# Patient Record
Sex: MALE | Race: WHITE | Employment: FULL TIME | URBAN - METROPOLITAN AREA
[De-identification: names, ages, dates, MRNs, and addresses within clinical notes are randomized per-mention and may not be internally consistent; named-entity substitution may affect disease eponyms.]

---

## 2017-04-03 ENCOUNTER — HOSPITAL ENCOUNTER (EMERGENCY)
Age: 42
Discharge: HOME | End: 2017-04-03
Attending: EMERGENCY MEDICINE

## 2017-04-03 VITALS
DIASTOLIC BLOOD PRESSURE: 95 MMHG | BODY MASS INDEX: 38.57 KG/M2 | HEIGHT: 66 IN | OXYGEN SATURATION: 93 % | SYSTOLIC BLOOD PRESSURE: 173 MMHG | WEIGHT: 240 LBS | TEMPERATURE: 98.4 F | HEART RATE: 90 BPM | RESPIRATION RATE: 18 BRPM

## 2017-04-03 DIAGNOSIS — J01.00 ACUTE MAXILLARY SINUSITIS, RECURRENCE NOT SPECIFIED: Primary | ICD-10-CM

## 2017-04-03 DIAGNOSIS — R03.0 HIGH BLOOD PRESSURE (NOT HYPERTENSION): ICD-10-CM

## 2017-04-03 PROCEDURE — 99282 EMERGENCY DEPT VISIT SF MDM: CPT

## 2017-04-03 PROCEDURE — 6370000000 HC RX 637 (ALT 250 FOR IP): Performed by: EMERGENCY MEDICINE

## 2017-04-03 RX ORDER — SULFAMETHOXAZOLE AND TRIMETHOPRIM 800; 160 MG/1; MG/1
1 TABLET ORAL ONCE
Status: COMPLETED | OUTPATIENT
Start: 2017-04-03 | End: 2017-04-03

## 2017-04-03 RX ORDER — SULFAMETHOXAZOLE AND TRIMETHOPRIM 800; 160 MG/1; MG/1
1 TABLET ORAL 2 TIMES DAILY
Qty: 20 TABLET | Refills: 0 | Status: SHIPPED | OUTPATIENT
Start: 2017-04-03 | End: 2017-04-13

## 2017-04-03 RX ORDER — BENZONATATE 100 MG/1
100 CAPSULE ORAL 3 TIMES DAILY PRN
Qty: 30 CAPSULE | Refills: 0 | Status: SHIPPED | OUTPATIENT
Start: 2017-04-03 | End: 2017-04-10

## 2017-04-03 RX ADMIN — SULFAMETHOXAZOLE AND TRIMETHOPRIM 1 TABLET: 800; 160 TABLET ORAL at 19:04

## 2017-04-03 ASSESSMENT — ENCOUNTER SYMPTOMS
BACK PAIN: 0
NAUSEA: 0
EYE REDNESS: 0
SINUS PRESSURE: 1
CONSTIPATION: 0
WHEEZING: 0
EYE PAIN: 0
SHORTNESS OF BREATH: 0
EYE DISCHARGE: 0
RHINORRHEA: 0
COLOR CHANGE: 0
DIARRHEA: 0
COUGH: 1
SORE THROAT: 0
CHEST TIGHTNESS: 0
ABDOMINAL PAIN: 0

## 2018-02-07 ENCOUNTER — HOSPITAL ENCOUNTER (EMERGENCY)
Age: 43
Discharge: HOME | End: 2018-02-07
Attending: EMERGENCY MEDICINE

## 2018-02-07 VITALS
RESPIRATION RATE: 16 BRPM | HEIGHT: 66 IN | SYSTOLIC BLOOD PRESSURE: 154 MMHG | BODY MASS INDEX: 41.14 KG/M2 | OXYGEN SATURATION: 97 % | DIASTOLIC BLOOD PRESSURE: 95 MMHG | WEIGHT: 256 LBS | TEMPERATURE: 98.6 F | HEART RATE: 99 BPM

## 2018-02-07 DIAGNOSIS — L03.116 CELLULITIS OF FOOT, LEFT: ICD-10-CM
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
## 2018-02-07 PROCEDURE — 99283 EMERGENCY DEPT VISIT LOW MDM: CPT

## 2018-02-07 PROCEDURE — 6370000000 HC RX 637 (ALT 250 FOR IP): Performed by: EMERGENCY MEDICINE

## 2018-02-07 RX ORDER — IBUPROFEN 800 MG/1
800 TABLET ORAL ONCE
Status: COMPLETED | OUTPATIENT
Start: 2018-02-07 | End: 2018-02-07

## 2018-02-07 RX ORDER — DOXYCYCLINE HYCLATE 100 MG
100 TABLET ORAL ONCE
Status: COMPLETED | OUTPATIENT
Start: 2018-02-07 | End: 2018-02-07

## 2018-02-07 RX ORDER — DOXYCYCLINE 100 MG/1
100 TABLET ORAL 2 TIMES DAILY
Qty: 16 TABLET | Refills: 0 | Status: SHIPPED | OUTPATIENT
Start: 2018-02-07 | End: 2018-02-15

## 2018-02-07 RX ADMIN — DOXYCYCLINE HYCLATE 100 MG: 100 TABLET, COATED ORAL at 22:27

## 2018-02-07 RX ADMIN — IBUPROFEN 800 MG: 800 TABLET ORAL at 22:27

## 2018-02-07 ASSESSMENT — PAIN DESCRIPTION - LOCATION: LOCATION: FOOT

## 2018-02-07 ASSESSMENT — PAIN SCALES - GENERAL: PAINLEVEL_OUTOF10: 6

## 2018-02-07 ASSESSMENT — PAIN DESCRIPTION - ORIENTATION: ORIENTATION: LEFT

## 2018-02-07 ASSESSMENT — PAIN DESCRIPTION - DESCRIPTORS: DESCRIPTORS: ACHING

## 2018-02-08 ASSESSMENT — ENCOUNTER SYMPTOMS
VOMITING: 0
NAUSEA: 0
ABDOMINAL PAIN: 0
DIARRHEA: 0
SHORTNESS OF BREATH: 0
SORE THROAT: 0

## 2018-02-08 NOTE — ED PROVIDER NOTES
[facility] ED  [address] [address]  Phone: [phone]  Fax: [phone]    eMERGENCY dEPARTMENT eNCOUnter          Pt Name: Esau Pimentel  MRN: [id number]  [place] 1975  Date of evaluation: 2/7/2018      CHIEF COMPLAINT       Chief Complaint   Patient presents with    Rash     to extremeties    Foot Pain     left       HISTORY OF PRESENT ILLNESS              Esau Pimentel is a 43 y.o. male who presents with numerous erythematous maculopapular scabbed lesions to both legs from the knees down and the elbows down. Patient tells me that he thinks it may be fleas versus bedbugs. States he did see to bedbugs that he eliminated and has not seen any more. Patient also reports that his pets may have brought fleas in the house and has been trying to eradicate those from the house. He tells me he wears shorts and a T-shirt to bed which explains the distribution of the lesions on his distal arms and legs. He tells me that since yesterday one of the lesions on his left foot has become more erythematous, painful and larger in circumference of the erythema. Denies any fevers. Not currently on antibiotics. States all of his lesions occurred/began about 2-3 days ago. Denies other concerns. REVIEW OF SYSTEMS         Review of Systems   Constitutional: Negative for chills and fever. HENT: Negative for sore throat. Respiratory: Negative for shortness of breath. Cardiovascular: Negative for chest pain. Gastrointestinal: Negative for abdominal pain, diarrhea, nausea and vomiting. Musculoskeletal: Negative for neck pain. Skin: Positive for rash and wound. Neurological: Negative for weakness and numbness. All other systems reviewed and are negative. PAST MEDICAL HISTORY    has a past medical history of Heart attack. SURGICAL HISTORY      has a past surgical history that includes Cardiac catheterization.     CURRENT MEDICATIONS       Discharge minor induration surrounding that lesion. No fluctuance or crepitus detected. Rest of the skin exam has no tenderness to where the lesions are located. Appear to be bug bites of some sort. Otherwise unremarkable skin exam to the exposed areas where the patient is complaining of symptoms. I do not detect lesions on the palms or the soles. No evidence of necrotizing fasciitis. Nursing note and vitals reviewed. DIFFERENTIAL DIAGNOSIS/ MDM:     At this time I feel this is most likely secondary to insect bites. Unclear if these are fleas versus bedbugs. He was advised to continue cleaning his living quarters. I will put the patient on doxycycline to treat his developing foot cellulitis. I do not appreciate any significant edema to the foot. Advised to return right away if worse or for new or concerning symptoms and to follow have close follow-up. He is comfortable with this plan. Also advised to continue with ibuprofen for pain. his last dose was this morning. I have reviewed the disposition diagnosis with the patient and or their family/guardian. I have answered their questions and given discharge instructions. They voiced understanding of these instructions and did not have any further questions or complaints. DIAGNOSTIC RESULTS     EKG: All EKG's are interpreted by the Emergency Department Physician who either signs or Co-signs this chart in the absence of a cardiologist.        RADIOLOGY:   I directly visualized the following  images and reviewed the radiologist interpretations:  No orders to display       No results found. LABS:  No results found for this visit on 02/07/18.     EMERGENCY DEPARTMENT COURSE:     The patient was given the following medications:  Orders Placed This Encounter   Medications    doxycycline monohydrate (ADOXA) 100 MG tablet     Sig: Take 1 tablet by mouth 2 times daily for 8 days     Dispense:  16 tablet     Refill:  0    doxycycline hyclate (VIBRA-TABS)

## 2018-09-09 ENCOUNTER — HOSPITAL ENCOUNTER (EMERGENCY)
Age: 43
Discharge: HOME | End: 2018-09-09
Attending: EMERGENCY MEDICINE

## 2018-09-09 VITALS
RESPIRATION RATE: 16 BRPM | TEMPERATURE: 98.1 F | SYSTOLIC BLOOD PRESSURE: 136 MMHG | DIASTOLIC BLOOD PRESSURE: 84 MMHG | OXYGEN SATURATION: 97 % | HEART RATE: 79 BPM

## 2018-09-09 DIAGNOSIS — R19.7 DIARRHEA, UNSPECIFIED TYPE: Primary | ICD-10-CM

## 2018-09-09 PROCEDURE — 99283 EMERGENCY DEPT VISIT LOW MDM: CPT

## 2018-09-09 NOTE — LETTER
[facility] ED  [address] [address]  Phone: [phone]  Fax: [phone]               September 9, 2018    Patient: Umair Turner   YOB: 1975   Date of Visit: 9/9/2018       To Whom It May Concern:    Iris Mccormikc was seen and treated in our emergency department on 9/9/2018.        Sincerely,       Ashley Johnson RN         Signature:__________________________________

## 2018-09-09 NOTE — ED PROVIDER NOTES
and moist.   Eyes: Pupils are equal, round, and reactive to light. Conjunctivae and EOM are normal.   Neck: Normal range of motion. Neck supple. No tracheal deviation present. Cardiovascular: Normal rate, regular rhythm and intact distal pulses. Pulmonary/Chest: Effort normal and breath sounds normal.   Abdominal: Soft. Bowel sounds are normal. He exhibits no distension. There is no tenderness. Musculoskeletal: Normal range of motion. He exhibits no edema or tenderness. Neurological: He is alert and oriented to person, place, and time. Skin: Skin is warm and dry. No rash noted. Psychiatric: He has a normal mood and affect. His behavior is normal. Judgment and thought content normal.   Vitals reviewed. MDM:   Abdomen is completely benign here. He is hemodynamically stable. I written for stool studies to be done as an outpatient. Supportive care instructions have been given and he will be discharged. The patient was given the following medications:  No orders of the defined types were placed in this encounter. FINAL IMPRESSION      1.  Diarrhea, unspecified type          DISPOSITION/PLAN   DISPOSITION Decision To Discharge 09/09/2018 01:47:21 PM      Condition on Disposition  Good    PATIENT REFERRED TO:  Your personal physician    Schedule an appointment as soon as possible for a visit in 3 days        DISCHARGE MEDICATIONS:  New Prescriptions    No medications on file       (Please note that portions of this note were completed with a voice recognition program.  Efforts were made to edit the dictations but occasionally words are mis-transcribed.)    Rajiv Chi MD, F.A.C.E.P, F.A.A.E.M  Emergency Physician Attending          Rajiv Chi MD  09/09/18 [id number]

## 2023-11-25 ENCOUNTER — HOSPITAL ENCOUNTER (EMERGENCY)
Age: 48
Discharge: HOME | End: 2023-11-25
Attending: STUDENT IN AN ORGANIZED HEALTH CARE EDUCATION/TRAINING PROGRAM
Payer: COMMERCIAL

## 2023-11-25 VITALS
BODY MASS INDEX: 41.78 KG/M2 | HEIGHT: 66 IN | SYSTOLIC BLOOD PRESSURE: 158 MMHG | WEIGHT: 260 LBS | OXYGEN SATURATION: 97 % | DIASTOLIC BLOOD PRESSURE: 80 MMHG | TEMPERATURE: 97.6 F | HEART RATE: 89 BPM | RESPIRATION RATE: 16 BRPM

## 2023-11-25 DIAGNOSIS — K04.7 DENTAL INFECTION: Primary | ICD-10-CM

## 2023-11-25 PROCEDURE — 99283 EMERGENCY DEPT VISIT LOW MDM: CPT

## 2023-11-25 PROCEDURE — 6370000000 HC RX 637 (ALT 250 FOR IP)

## 2023-11-25 RX ORDER — OXYCODONE HYDROCHLORIDE AND ACETAMINOPHEN 5; 325 MG/1; MG/1
1 TABLET ORAL ONCE
Status: COMPLETED | OUTPATIENT
Start: 2023-11-25 | End: 2023-11-25

## 2023-11-25 RX ORDER — CLINDAMYCIN HYDROCHLORIDE 150 MG/1
300 CAPSULE ORAL ONCE
Status: COMPLETED | OUTPATIENT
Start: 2023-11-25 | End: 2023-11-25

## 2023-11-25 RX ORDER — CLINDAMYCIN PHOSPHATE 150 MG/ML
600 INJECTION, SOLUTION INTRAVENOUS ONCE
Status: DISCONTINUED | OUTPATIENT
Start: 2023-11-25 | End: 2023-11-25

## 2023-11-25 RX ORDER — CLINDAMYCIN HYDROCHLORIDE 300 MG/1
300 CAPSULE ORAL 3 TIMES DAILY
Qty: 21 CAPSULE | Refills: 0 | Status: SHIPPED | OUTPATIENT
Start: 2023-11-25 | End: 2023-12-02

## 2023-11-25 RX ORDER — OXYCODONE HYDROCHLORIDE AND ACETAMINOPHEN 5; 325 MG/1; MG/1
1 TABLET ORAL EVERY 6 HOURS PRN
Qty: 12 TABLET | Refills: 0 | Status: SHIPPED | OUTPATIENT
Start: 2023-11-25 | End: 2023-11-28

## 2023-11-25 RX ADMIN — OXYCODONE AND ACETAMINOPHEN 1 TABLET: 5; 325 TABLET ORAL at 14:09

## 2023-11-25 RX ADMIN — CLINDAMYCIN HYDROCHLORIDE 300 MG: 150 CAPSULE ORAL at 14:09

## 2023-11-25 ASSESSMENT — PAIN SCALES - GENERAL: PAINLEVEL_OUTOF10: 8

## 2023-11-25 ASSESSMENT — PAIN DESCRIPTION - LOCATION: LOCATION: MOUTH

## 2023-11-25 ASSESSMENT — PAIN DESCRIPTION - PAIN TYPE: TYPE: ACUTE PAIN

## 2023-11-25 ASSESSMENT — PAIN DESCRIPTION - ORIENTATION: ORIENTATION: RIGHT;LOWER

## 2023-11-25 ASSESSMENT — PAIN - FUNCTIONAL ASSESSMENT: PAIN_FUNCTIONAL_ASSESSMENT: 0-10

## 2024-05-26 ENCOUNTER — HOSPITAL ENCOUNTER (EMERGENCY)
Age: 49
Discharge: HOME | End: 2024-05-26
Attending: EMERGENCY MEDICINE
Payer: COMMERCIAL

## 2024-05-26 ENCOUNTER — APPOINTMENT (OUTPATIENT)
Dept: GENERAL RADIOLOGY | Age: 49
End: 2024-05-26
Payer: COMMERCIAL

## 2024-05-26 VITALS
BODY MASS INDEX: 40.83 KG/M2 | SYSTOLIC BLOOD PRESSURE: 126 MMHG | HEART RATE: 103 BPM | TEMPERATURE: 98.6 F | OXYGEN SATURATION: 92 % | DIASTOLIC BLOOD PRESSURE: 75 MMHG | WEIGHT: 260.14 LBS | RESPIRATION RATE: 18 BRPM | HEIGHT: 67 IN

## 2024-05-26 DIAGNOSIS — J44.1 COPD EXACERBATION (HCC): Primary | ICD-10-CM

## 2024-05-26 LAB
FLUAV AG SPEC QL: NEGATIVE
FLUBV AG SPEC QL: NEGATIVE
GLUCOSE BLD-MCNC: 93 MG/DL (ref 75–110)
SARS-COV-2 RDRP RESP QL NAA+PROBE: NOT DETECTED
SPECIMEN DESCRIPTION: NORMAL

## 2024-05-26 PROCEDURE — 71046 X-RAY EXAM CHEST 2 VIEWS: CPT

## 2024-05-26 PROCEDURE — 6370000000 HC RX 637 (ALT 250 FOR IP): Performed by: NURSE PRACTITIONER

## 2024-05-26 PROCEDURE — 99284 EMERGENCY DEPT VISIT MOD MDM: CPT

## 2024-05-26 PROCEDURE — 82947 ASSAY GLUCOSE BLOOD QUANT: CPT

## 2024-05-26 PROCEDURE — 87804 INFLUENZA ASSAY W/OPTIC: CPT

## 2024-05-26 PROCEDURE — 94640 AIRWAY INHALATION TREATMENT: CPT

## 2024-05-26 PROCEDURE — 87635 SARS-COV-2 COVID-19 AMP PRB: CPT

## 2024-05-26 RX ORDER — DOXYCYCLINE HYCLATE 100 MG
100 TABLET ORAL 2 TIMES DAILY
Qty: 20 TABLET | Refills: 0 | Status: SHIPPED | OUTPATIENT
Start: 2024-05-26 | End: 2024-06-05

## 2024-05-26 RX ORDER — PREDNISONE 20 MG/1
TABLET ORAL
Qty: 15 TABLET | Refills: 0 | Status: SHIPPED | OUTPATIENT
Start: 2024-05-27 | End: 2024-06-06

## 2024-05-26 RX ORDER — PREDNISONE 20 MG/1
60 TABLET ORAL ONCE
Status: COMPLETED | OUTPATIENT
Start: 2024-05-26 | End: 2024-05-26

## 2024-05-26 RX ORDER — DOXYCYCLINE HYCLATE 100 MG
100 TABLET ORAL ONCE
Status: COMPLETED | OUTPATIENT
Start: 2024-05-26 | End: 2024-05-26

## 2024-05-26 RX ORDER — ALBUTEROL SULFATE 90 UG/1
2 AEROSOL, METERED RESPIRATORY (INHALATION) 4 TIMES DAILY PRN
Qty: 18 G | Refills: 0 | Status: SHIPPED | OUTPATIENT
Start: 2024-05-26

## 2024-05-26 RX ORDER — IPRATROPIUM BROMIDE AND ALBUTEROL SULFATE 2.5; .5 MG/3ML; MG/3ML
1 SOLUTION RESPIRATORY (INHALATION) ONCE
Status: COMPLETED | OUTPATIENT
Start: 2024-05-26 | End: 2024-05-26

## 2024-05-26 RX ADMIN — IPRATROPIUM BROMIDE AND ALBUTEROL SULFATE 1 DOSE: 2.5; .5 SOLUTION RESPIRATORY (INHALATION) at 13:43

## 2024-05-26 RX ADMIN — PREDNISONE 60 MG: 20 TABLET ORAL at 13:03

## 2024-05-26 RX ADMIN — DOXYCYCLINE HYCLATE 100 MG: 100 TABLET, COATED ORAL at 14:21

## 2024-05-26 ASSESSMENT — PAIN - FUNCTIONAL ASSESSMENT: PAIN_FUNCTIONAL_ASSESSMENT: NONE - DENIES PAIN

## 2024-05-26 NOTE — DISCHARGE INSTRUCTIONS
Complete doxycycline as prescribed antibiotic.    Complete prednisone as prescribed.    Albuterol inhaler as prescribed to help with coughing wheezing.    Return to the ER: Fevers not responding to Tylenol, chest pain, continued or worsening breathing difficulty, weakness or confusion, lightheaded or dizziness; or any other concerning symptoms.

## 2024-05-26 NOTE — ED PROVIDER NOTES
[facility] EMERGENCY DEPARTMENT  EMERGENCY DEPARTMENT ENCOUNTER      Pt Name: Jesús Min  MRN: [id number]  Birthdate 1975  Date of evaluation: 5/26/2024  Provider: JOSE Bautista CNP  2:18 PM    CHIEF COMPLAINT       Chief Complaint   Patient presents with    Shortness of Breath     Shortness of breath, productive cough.  Onset about 4 days ago.  Pt is 1 pack/day cigarette smoker         HISTORY OF PRESENT ILLNESS    Jesús Min is a 49 y.o. male who presents to the emergency department      This is a nontoxic-appearing 49-year-old male who is a 1 pack/day tobacco smoker; reports over the last 4 days he started with nasal congestion, chills, increased difficulty breathing chest congestion, productive cough with white sputum, he is not currently following with a family care provider, was concerned because of the worsening breathing difficulty, wheezing prompting him to come to the emergency department for evaluation.  No alleviating measures have been attempted prior to arrival.  The patient denies that he has any chest pain with this.  He states that he has noticed that his breathing has gotten worse over the past 6 months.  The patient has no inhalers currently.  The patient states he has not formally been diagnosed with COPD or asthma.    The history is provided by the patient and medical records.       Nursing Notes were reviewed.    REVIEW OF SYSTEMS       Review of Systems   Constitutional:  Positive for chills. Negative for fever.   HENT:  Positive for congestion and sore throat. Negative for ear pain and sneezing.    Eyes:  Negative for discharge and visual disturbance.   Respiratory:  Positive for cough and shortness of breath.    Cardiovascular:  Negative for chest pain and palpitations.   Gastrointestinal:  Negative for abdominal pain, constipation, diarrhea, nausea and vomiting.   Genitourinary:  Negative for dysuria and frequency.   Musculoskeletal:  Negative for back

## 2024-05-26 NOTE — ED PROVIDER NOTES
[facility] Emergency Department      Pt Name: Jesús Min  MRN: [id number]  Birthdate 1975  Date of evaluation: 5/26/2024    EMERGENCY DEPARTMENT ENCOUNTER      PERTINENT ATTENDING PHYSICIAN COMMENTS:      Faculty Attestation    I performed a history and physical examination of the patient and discussed management with the mid level provideer. I reviewed the mid level provider's note and agree with the documented findings and plan of care.Any areas of disagreement are noted on the chart. I was personally present for the key portions of any procedures. I have documented in the chart those procedures where I was not present during the key portions. I have reviewed the emergency nurses triage note. I agree with the chief complaint, past medical history, past surgical history, allergies, medications, social and family history as documented unless otherwise noted below. Documentation of the HPI, Physical Exam and Medical Decision Making performed by medical students or scribes is based on my personal performance of the HPI, PE and MDM. For Residents/Physician Assistant/ Nurse Practitioner cases/documentation I have personally evaluated this patient and have completed at least one if not all key elements of the E/M (history, physical exam, and MDM). Additional findings are as noted.    CHIEF COMPLAINT       Chief Complaint   Patient presents with    Shortness of Breath     Shortness of breath, productive cough.  Onset about 4 days ago.  Pt is 1 pack/day cigarette smoker       HISTORY OF PRESENT ILLNESS    Jesús Min is a 49 y.o. male who presents to the emergency department complaining of 4 to 5 days of cough and congestion with wheezing.  He is a smoker.  Denies any chest pain.  He admits to exertional shortness of breath with this productive cough.  Denies any other relevant symptoms.  No leg pain or leg swelling.    PAST MEDICAL HISTORY    has a past medical history of Heart attack

## 2024-06-11 ENCOUNTER — HOSPITAL ENCOUNTER (OUTPATIENT)
Age: 49
Discharge: HOME | End: 2024-06-13
Payer: COMMERCIAL

## 2024-06-11 ENCOUNTER — HOSPITAL ENCOUNTER (OUTPATIENT)
Dept: GENERAL RADIOLOGY | Age: 49
Discharge: HOME | End: 2024-06-13
Payer: COMMERCIAL

## 2024-06-11 ENCOUNTER — OFFICE VISIT (OUTPATIENT)
Dept: FAMILY MEDICINE CLINIC | Age: 49
End: 2024-06-11
Payer: COMMERCIAL

## 2024-06-11 VITALS
HEART RATE: 104 BPM | DIASTOLIC BLOOD PRESSURE: 82 MMHG | WEIGHT: 253 LBS | BODY MASS INDEX: 39.71 KG/M2 | OXYGEN SATURATION: 95 % | RESPIRATION RATE: 20 BRPM | TEMPERATURE: 98.6 F | SYSTOLIC BLOOD PRESSURE: 134 MMHG

## 2024-06-11 DIAGNOSIS — R09.81 NASAL CONGESTION: ICD-10-CM

## 2024-06-11 DIAGNOSIS — Z72.0 TOBACCO ABUSE: ICD-10-CM

## 2024-06-11 DIAGNOSIS — R06.2 WHEEZING: ICD-10-CM

## 2024-06-11 DIAGNOSIS — R06.02 SHORTNESS OF BREATH: ICD-10-CM

## 2024-06-11 DIAGNOSIS — R05.1 ACUTE COUGH: Primary | ICD-10-CM

## 2024-06-11 DIAGNOSIS — R05.1 ACUTE COUGH: ICD-10-CM

## 2024-06-11 PROCEDURE — 99204 OFFICE O/P NEW MOD 45 MIN: CPT | Performed by: NURSE PRACTITIONER

## 2024-06-11 PROCEDURE — G8417 CALC BMI ABV UP PARAM F/U: HCPCS | Performed by: NURSE PRACTITIONER

## 2024-06-11 PROCEDURE — G8427 DOCREV CUR MEDS BY ELIG CLIN: HCPCS | Performed by: NURSE PRACTITIONER

## 2024-06-11 PROCEDURE — 71046 X-RAY EXAM CHEST 2 VIEWS: CPT

## 2024-06-11 PROCEDURE — 4004F PT TOBACCO SCREEN RCVD TLK: CPT | Performed by: NURSE PRACTITIONER

## 2024-06-11 RX ORDER — FLUTICASONE PROPIONATE 50 MCG
2 SPRAY, SUSPENSION (ML) NASAL DAILY
Qty: 16 G | Refills: 0 | Status: ON HOLD | OUTPATIENT
Start: 2024-06-11

## 2024-06-11 RX ORDER — AZITHROMYCIN 250 MG/1
TABLET, FILM COATED ORAL
Qty: 1 PACKET | Refills: 0 | Status: ON HOLD | OUTPATIENT
Start: 2024-06-11

## 2024-06-11 RX ORDER — GUAIFENESIN 600 MG/1
600 TABLET, EXTENDED RELEASE ORAL 2 TIMES DAILY
Qty: 30 TABLET | Refills: 0 | Status: ON HOLD | OUTPATIENT
Start: 2024-06-11 | End: 2024-06-26

## 2024-06-11 RX ORDER — PREDNISONE 20 MG/1
20 TABLET ORAL 2 TIMES DAILY
Qty: 10 TABLET | Refills: 0 | Status: ON HOLD | OUTPATIENT
Start: 2024-06-11 | End: 2024-06-16

## 2024-06-11 SDOH — ECONOMIC STABILITY: FOOD INSECURITY: WITHIN THE PAST 12 MONTHS, YOU WORRIED THAT YOUR FOOD WOULD RUN OUT BEFORE YOU GOT MONEY TO BUY MORE.: NEVER TRUE

## 2024-06-11 SDOH — ECONOMIC STABILITY: INCOME INSECURITY: HOW HARD IS IT FOR YOU TO PAY FOR THE VERY BASICS LIKE FOOD, HOUSING, MEDICAL CARE, AND HEATING?: NOT VERY HARD

## 2024-06-11 SDOH — ECONOMIC STABILITY: FOOD INSECURITY: WITHIN THE PAST 12 MONTHS, THE FOOD YOU BOUGHT JUST DIDN'T LAST AND YOU DIDN'T HAVE MONEY TO GET MORE.: NEVER TRUE

## 2024-06-11 SDOH — ECONOMIC STABILITY: HOUSING INSECURITY
IN THE LAST 12 MONTHS, WAS THERE A TIME WHEN YOU DID NOT HAVE A STEADY PLACE TO SLEEP OR SLEPT IN A SHELTER (INCLUDING NOW)?: NO

## 2024-06-11 ASSESSMENT — PATIENT HEALTH QUESTIONNAIRE - PHQ9
SUM OF ALL RESPONSES TO PHQ QUESTIONS 1-9: 0
2. FEELING DOWN, DEPRESSED OR HOPELESS: NOT AT ALL
SUM OF ALL RESPONSES TO PHQ QUESTIONS 1-9: 0
1. LITTLE INTEREST OR PLEASURE IN DOING THINGS: NOT AT ALL
SUM OF ALL RESPONSES TO PHQ9 QUESTIONS 1 & 2: 0

## 2024-06-11 ASSESSMENT — ENCOUNTER SYMPTOMS
HEARTBURN: 0
CHOKING: 0
HEMOPTYSIS: 0
RHINORRHEA: 1
SORE THROAT: 0
SHORTNESS OF BREATH: 1
WHEEZING: 1
CHEST TIGHTNESS: 0
COUGH: 1
STRIDOR: 0

## 2024-06-11 NOTE — PROGRESS NOTES
benefit, and side effects of prescribed medications.  All patient questions answered.  Pt voicedunderstanding.    Electronically signed by JOSE Moses CNP on 6/11/2024 at 6:39 PM

## 2024-06-15 ENCOUNTER — HOSPITAL ENCOUNTER (INPATIENT)
Age: 49
LOS: 3 days | Discharge: ANOTHER HOSPITAL | End: 2024-06-18
Attending: EMERGENCY MEDICINE | Admitting: STUDENT IN AN ORGANIZED HEALTH CARE EDUCATION/TRAINING PROGRAM
Payer: COMMERCIAL

## 2024-06-15 ENCOUNTER — APPOINTMENT (OUTPATIENT)
Dept: CT IMAGING | Age: 49
End: 2024-06-15
Payer: COMMERCIAL

## 2024-06-15 ENCOUNTER — APPOINTMENT (OUTPATIENT)
Dept: GENERAL RADIOLOGY | Age: 49
End: 2024-06-15
Payer: COMMERCIAL

## 2024-06-15 DIAGNOSIS — R07.9 CHEST PAIN, UNSPECIFIED TYPE: Primary | ICD-10-CM

## 2024-06-15 DIAGNOSIS — I25.10 CAD, MULTIPLE VESSEL: ICD-10-CM

## 2024-06-15 DIAGNOSIS — I21.4 NSTEMI (NON-ST ELEVATED MYOCARDIAL INFARCTION) (HCC): ICD-10-CM

## 2024-06-15 PROBLEM — I50.9 NEW ONSET OF CONGESTIVE HEART FAILURE (HCC): Status: ACTIVE | Noted: 2024-06-15

## 2024-06-15 LAB
ALBUMIN SERPL-MCNC: 3.5 G/DL (ref 3.5–5.2)
ALBUMIN/GLOB SERPL: 1 {RATIO} (ref 1–2.5)
ALP SERPL-CCNC: 104 U/L (ref 40–129)
ALT SERPL-CCNC: 50 U/L (ref 5–41)
ANION GAP SERPL CALCULATED.3IONS-SCNC: 12 MMOL/L (ref 9–17)
AST SERPL-CCNC: 26 U/L
BASOPHILS # BLD: 0.1 K/UL (ref 0–0.2)
BASOPHILS NFR BLD: 1 % (ref 0–2)
BILIRUB SERPL-MCNC: 0.4 MG/DL (ref 0.3–1.2)
BNP SERPL-MCNC: 6732 PG/ML
BUN SERPL-MCNC: 13 MG/DL (ref 6–20)
CALCIUM SERPL-MCNC: 9.1 MG/DL (ref 8.6–10.4)
CHLORIDE SERPL-SCNC: 105 MMOL/L (ref 98–107)
CO2 SERPL-SCNC: 22 MMOL/L (ref 20–31)
CREAT SERPL-MCNC: 0.8 MG/DL (ref 0.7–1.2)
EOSINOPHIL # BLD: 0 K/UL (ref 0–0.4)
EOSINOPHILS RELATIVE PERCENT: 0 % (ref 1–4)
ERYTHROCYTE [DISTWIDTH] IN BLOOD BY AUTOMATED COUNT: 15.8 % (ref 12.5–15.4)
ERYTHROCYTE [DISTWIDTH] IN BLOOD BY AUTOMATED COUNT: 16 % (ref 12.5–15.4)
GFR, ESTIMATED: >90 ML/MIN/1.73M2
GLUCOSE SERPL-MCNC: 131 MG/DL (ref 70–99)
HCT VFR BLD AUTO: 39.2 % (ref 41–53)
HCT VFR BLD AUTO: 40.5 % (ref 41–53)
HGB BLD-MCNC: 12.7 G/DL (ref 13.5–17.5)
HGB BLD-MCNC: 13.1 G/DL (ref 13.5–17.5)
LYMPHOCYTES NFR BLD: 2.3 K/UL (ref 1–4.8)
LYMPHOCYTES RELATIVE PERCENT: 19 % (ref 24–44)
MAGNESIUM SERPL-MCNC: 2.2 MG/DL (ref 1.6–2.6)
MCH RBC QN AUTO: 27.8 PG (ref 26–34)
MCH RBC QN AUTO: 28 PG (ref 26–34)
MCHC RBC AUTO-ENTMCNC: 32.3 G/DL (ref 31–37)
MCHC RBC AUTO-ENTMCNC: 32.4 G/DL (ref 31–37)
MCV RBC AUTO: 86.2 FL (ref 80–100)
MCV RBC AUTO: 86.3 FL (ref 80–100)
MONOCYTES NFR BLD: 0.7 K/UL (ref 0.1–1.2)
MONOCYTES NFR BLD: 6 % (ref 2–11)
NEUTROPHILS NFR BLD: 74 % (ref 36–66)
NEUTS SEG NFR BLD: 8.9 K/UL (ref 1.8–7.7)
PARTIAL THROMBOPLASTIN TIME: 26 SEC (ref 21.3–31.3)
PLATELET # BLD AUTO: 310 K/UL (ref 140–450)
PLATELET # BLD AUTO: 359 K/UL (ref 140–450)
PMV BLD AUTO: 7.5 FL (ref 6–12)
PMV BLD AUTO: 7.6 FL (ref 6–12)
POTASSIUM SERPL-SCNC: 4 MMOL/L (ref 3.7–5.3)
PROT SERPL-MCNC: 6.9 G/DL (ref 6.4–8.3)
RBC # BLD AUTO: 4.55 M/UL (ref 4.5–5.9)
RBC # BLD AUTO: 4.7 M/UL (ref 4.5–5.9)
SODIUM SERPL-SCNC: 139 MMOL/L (ref 135–144)
TROPONIN I SERPL HS-MCNC: 481 NG/L (ref 0–22)
TROPONIN I SERPL HS-MCNC: 488 NG/L (ref 0–22)
TROPONIN I SERPL HS-MCNC: 567 NG/L (ref 0–22)
WBC OTHER # BLD: 11.8 K/UL (ref 3.5–11)
WBC OTHER # BLD: 12 K/UL (ref 3.5–11)

## 2024-06-15 PROCEDURE — 6360000002 HC RX W HCPCS

## 2024-06-15 PROCEDURE — 6370000000 HC RX 637 (ALT 250 FOR IP): Performed by: STUDENT IN AN ORGANIZED HEALTH CARE EDUCATION/TRAINING PROGRAM

## 2024-06-15 PROCEDURE — 85025 COMPLETE CBC W/AUTO DIFF WBC: CPT

## 2024-06-15 PROCEDURE — 83880 ASSAY OF NATRIURETIC PEPTIDE: CPT

## 2024-06-15 PROCEDURE — 94761 N-INVAS EAR/PLS OXIMETRY MLT: CPT

## 2024-06-15 PROCEDURE — 71260 CT THORAX DX C+: CPT

## 2024-06-15 PROCEDURE — 2580000003 HC RX 258: Performed by: EMERGENCY MEDICINE

## 2024-06-15 PROCEDURE — 85027 COMPLETE CBC AUTOMATED: CPT

## 2024-06-15 PROCEDURE — 94640 AIRWAY INHALATION TREATMENT: CPT

## 2024-06-15 PROCEDURE — 6360000004 HC RX CONTRAST MEDICATION: Performed by: EMERGENCY MEDICINE

## 2024-06-15 PROCEDURE — 99222 1ST HOSP IP/OBS MODERATE 55: CPT | Performed by: STUDENT IN AN ORGANIZED HEALTH CARE EDUCATION/TRAINING PROGRAM

## 2024-06-15 PROCEDURE — 2700000000 HC OXYGEN THERAPY PER DAY

## 2024-06-15 PROCEDURE — 2060000000 HC ICU INTERMEDIATE R&B

## 2024-06-15 PROCEDURE — 6360000002 HC RX W HCPCS: Performed by: STUDENT IN AN ORGANIZED HEALTH CARE EDUCATION/TRAINING PROGRAM

## 2024-06-15 PROCEDURE — 85730 THROMBOPLASTIN TIME PARTIAL: CPT

## 2024-06-15 PROCEDURE — 83735 ASSAY OF MAGNESIUM: CPT

## 2024-06-15 PROCEDURE — 84484 ASSAY OF TROPONIN QUANT: CPT

## 2024-06-15 PROCEDURE — 6370000000 HC RX 637 (ALT 250 FOR IP): Performed by: NURSE PRACTITIONER

## 2024-06-15 PROCEDURE — 80053 COMPREHEN METABOLIC PANEL: CPT

## 2024-06-15 PROCEDURE — 36415 COLL VENOUS BLD VENIPUNCTURE: CPT

## 2024-06-15 PROCEDURE — 2580000003 HC RX 258: Performed by: STUDENT IN AN ORGANIZED HEALTH CARE EDUCATION/TRAINING PROGRAM

## 2024-06-15 PROCEDURE — 93005 ELECTROCARDIOGRAM TRACING: CPT

## 2024-06-15 PROCEDURE — 99285 EMERGENCY DEPT VISIT HI MDM: CPT

## 2024-06-15 RX ORDER — SODIUM CHLORIDE 0.9 % (FLUSH) 0.9 %
10 SYRINGE (ML) INJECTION PRN
Status: DISCONTINUED | OUTPATIENT
Start: 2024-06-15 | End: 2024-06-18 | Stop reason: HOSPADM

## 2024-06-15 RX ORDER — HEPARIN SODIUM 1000 [USP'U]/ML
4000 INJECTION, SOLUTION INTRAVENOUS; SUBCUTANEOUS ONCE
Status: COMPLETED | OUTPATIENT
Start: 2024-06-15 | End: 2024-06-15

## 2024-06-15 RX ORDER — FUROSEMIDE 10 MG/ML
40 INJECTION INTRAMUSCULAR; INTRAVENOUS 2 TIMES DAILY
Status: DISCONTINUED | OUTPATIENT
Start: 2024-06-15 | End: 2024-06-18

## 2024-06-15 RX ORDER — POLYETHYLENE GLYCOL 3350 17 G/17G
17 POWDER, FOR SOLUTION ORAL DAILY PRN
Status: DISCONTINUED | OUTPATIENT
Start: 2024-06-15 | End: 2024-06-18 | Stop reason: HOSPADM

## 2024-06-15 RX ORDER — ACETAMINOPHEN 650 MG/1
650 SUPPOSITORY RECTAL EVERY 6 HOURS PRN
Status: DISCONTINUED | OUTPATIENT
Start: 2024-06-15 | End: 2024-06-18 | Stop reason: HOSPADM

## 2024-06-15 RX ORDER — SODIUM CHLORIDE 0.9 % (FLUSH) 0.9 %
5-40 SYRINGE (ML) INJECTION EVERY 12 HOURS SCHEDULED
Status: DISCONTINUED | OUTPATIENT
Start: 2024-06-15 | End: 2024-06-18 | Stop reason: HOSPADM

## 2024-06-15 RX ORDER — SODIUM CHLORIDE 0.9 % (FLUSH) 0.9 %
5-40 SYRINGE (ML) INJECTION PRN
Status: DISCONTINUED | OUTPATIENT
Start: 2024-06-15 | End: 2024-06-18 | Stop reason: HOSPADM

## 2024-06-15 RX ORDER — HEPARIN SODIUM 1000 [USP'U]/ML
2000 INJECTION, SOLUTION INTRAVENOUS; SUBCUTANEOUS PRN
Status: DISCONTINUED | OUTPATIENT
Start: 2024-06-15 | End: 2024-06-18 | Stop reason: HOSPADM

## 2024-06-15 RX ORDER — HEPARIN SODIUM 10000 [USP'U]/100ML
5-30 INJECTION, SOLUTION INTRAVENOUS CONTINUOUS
Status: DISCONTINUED | OUTPATIENT
Start: 2024-06-15 | End: 2024-06-18 | Stop reason: HOSPADM

## 2024-06-15 RX ORDER — ONDANSETRON 4 MG/1
4 TABLET, ORALLY DISINTEGRATING ORAL EVERY 8 HOURS PRN
Status: DISCONTINUED | OUTPATIENT
Start: 2024-06-15 | End: 2024-06-18 | Stop reason: HOSPADM

## 2024-06-15 RX ORDER — ALBUTEROL SULFATE 90 UG/1
2 AEROSOL, METERED RESPIRATORY (INHALATION) EVERY 4 HOURS PRN
Status: DISCONTINUED | OUTPATIENT
Start: 2024-06-15 | End: 2024-06-18 | Stop reason: HOSPADM

## 2024-06-15 RX ORDER — LORAZEPAM 2 MG/ML
0.5 INJECTION INTRAMUSCULAR ONCE
Status: COMPLETED | OUTPATIENT
Start: 2024-06-15 | End: 2024-06-15

## 2024-06-15 RX ORDER — MAGNESIUM SULFATE IN WATER 40 MG/ML
2000 INJECTION, SOLUTION INTRAVENOUS PRN
Status: DISCONTINUED | OUTPATIENT
Start: 2024-06-15 | End: 2024-06-18 | Stop reason: HOSPADM

## 2024-06-15 RX ORDER — 0.9 % SODIUM CHLORIDE 0.9 %
80 INTRAVENOUS SOLUTION INTRAVENOUS ONCE
Status: DISCONTINUED | OUTPATIENT
Start: 2024-06-15 | End: 2024-06-18 | Stop reason: HOSPADM

## 2024-06-15 RX ORDER — ONDANSETRON 2 MG/ML
4 INJECTION INTRAMUSCULAR; INTRAVENOUS EVERY 6 HOURS PRN
Status: DISCONTINUED | OUTPATIENT
Start: 2024-06-15 | End: 2024-06-18 | Stop reason: HOSPADM

## 2024-06-15 RX ORDER — ACETAMINOPHEN 325 MG/1
650 TABLET ORAL EVERY 6 HOURS PRN
Status: DISCONTINUED | OUTPATIENT
Start: 2024-06-15 | End: 2024-06-18 | Stop reason: HOSPADM

## 2024-06-15 RX ORDER — HEPARIN SODIUM 1000 [USP'U]/ML
4000 INJECTION, SOLUTION INTRAVENOUS; SUBCUTANEOUS PRN
Status: DISCONTINUED | OUTPATIENT
Start: 2024-06-15 | End: 2024-06-18 | Stop reason: HOSPADM

## 2024-06-15 RX ORDER — HYDROXYZINE HYDROCHLORIDE 25 MG/1
25 TABLET, FILM COATED ORAL 3 TIMES DAILY PRN
Status: DISCONTINUED | OUTPATIENT
Start: 2024-06-15 | End: 2024-06-18 | Stop reason: HOSPADM

## 2024-06-15 RX ORDER — SODIUM CHLORIDE 9 MG/ML
INJECTION, SOLUTION INTRAVENOUS PRN
Status: DISCONTINUED | OUTPATIENT
Start: 2024-06-15 | End: 2024-06-18 | Stop reason: HOSPADM

## 2024-06-15 RX ORDER — POTASSIUM CHLORIDE 7.45 MG/ML
10 INJECTION INTRAVENOUS PRN
Status: DISCONTINUED | OUTPATIENT
Start: 2024-06-15 | End: 2024-06-18 | Stop reason: HOSPADM

## 2024-06-15 RX ORDER — POTASSIUM CHLORIDE 20 MEQ/1
40 TABLET, EXTENDED RELEASE ORAL PRN
Status: DISCONTINUED | OUTPATIENT
Start: 2024-06-15 | End: 2024-06-18 | Stop reason: HOSPADM

## 2024-06-15 RX ADMIN — Medication 0.5 MG: at 16:50

## 2024-06-15 RX ADMIN — SODIUM CHLORIDE, PRESERVATIVE FREE 10 ML: 5 INJECTION INTRAVENOUS at 16:03

## 2024-06-15 RX ADMIN — HEPARIN SODIUM 8 UNITS/KG/HR: 10000 INJECTION, SOLUTION INTRAVENOUS at 16:50

## 2024-06-15 RX ADMIN — ALBUTEROL SULFATE 2 PUFF: 90 AEROSOL, METERED RESPIRATORY (INHALATION) at 23:00

## 2024-06-15 RX ADMIN — HEPARIN SODIUM 4000 UNITS: 1000 INJECTION INTRAVENOUS; SUBCUTANEOUS at 16:44

## 2024-06-15 RX ADMIN — SODIUM CHLORIDE, PRESERVATIVE FREE 10 ML: 5 INJECTION INTRAVENOUS at 20:40

## 2024-06-15 RX ADMIN — IOPAMIDOL 75 ML: 755 INJECTION, SOLUTION INTRAVENOUS at 16:02

## 2024-06-15 RX ADMIN — Medication 80 ML: at 16:03

## 2024-06-15 RX ADMIN — FUROSEMIDE 40 MG: 10 INJECTION, SOLUTION INTRAMUSCULAR; INTRAVENOUS at 20:39

## 2024-06-15 RX ADMIN — HYDROXYZINE HYDROCHLORIDE 25 MG: 25 TABLET, FILM COATED ORAL at 21:45

## 2024-06-15 ASSESSMENT — PAIN - FUNCTIONAL ASSESSMENT: PAIN_FUNCTIONAL_ASSESSMENT: NONE - DENIES PAIN

## 2024-06-15 NOTE — ED PROVIDER NOTES
[facility] EMERGENCY DEPARTMENT  Emergency Department Encounter  Mid Level Provider     Pt Name: Jesús Min  MRN: [id number]  Birthdate 1975  Date of evaluation: 6/15/24  PCP:  No primary care provider on file.    CHIEF COMPLAINT       Chief Complaint   Patient presents with    Shortness of Breath    Leg Swelling     Shortness of breath and bilateral leg swelling.  Shortness of breath started 1 day ago.  Leg swelling onset several days ago.        HISTORY OF PRESENT ILLNESS  (Location/Symptom, Timing/Onset,Context/Setting, Quality, Duration, Modifying Factors, Severity.)      Jesús Min is a 49 y.o. male who presents with complaints of exertional dyspnea and bilateral lower extremity edema.  Reports that leg swelling has been ongoing over the past 3 to 5 days but exertional dyspnea began yesterday.  He reports he was at a concert he was walking up stairs to get to his seat and noticed that every 5 steps he had to stop and take a breath.  He used his albuterol inhaler without any improvement.  He denies any associated chest pain, nausea, vomiting, diaphoresis with this shortness of breath.    He does endorse that he has recently been seen at both [place] emergency department and the walk-in clinic for shortness of breath and a cough with wheezing he has been on prednisone for this as well as antibiotics and an inhaler.  He was feeling better up until the leg swelling started.    He denies a history of COPD, asthma, congestive heart failure.  He is a daily smoker, and has a history of MI 15 to 20 years prior.    PAST MEDICAL /SURGICAL / SOCIAL / FAMILY HISTORY      has a past medical history of Heart attack (HCC).     has a past surgical history that includes Cardiac catheterization.    Social History     Socioeconomic History    Marital status:      Spouse name: Not on file    Number of children: Not on file    Years of education: Not on file    Highest education level: Not on  EMBOLISM W CONTRAST    CBC with Auto Differential    CMP    Magnesium    Troponin    Brain Natriuretic Peptide    CBC    CBC    APTT    EKG 12 Lead    ADMIT TO INPATIENT       Medications Ordered:  Orders Placed This Encounter   Medications    sodium chloride 0.9 % bolus 80 mL    iopamidol (ISOVUE-370) 76 % injection 75 mL    sodium chloride flush 0.9 % injection 10 mL    heparin (porcine) injection 4,000 Units    heparin (porcine) injection 4,000 Units    heparin (porcine) injection 2,000 Units    heparin 25,000 units in dextrose 5% 250 mL (premix) infusion    LORazepam (ATIVAN) injection 0.5 mg       Controlled Substances Monitoring:      DIAGNOSTIC RESULTS / RE-EVALUATION     Radiology:   I directly visualized(with the attending physician) the following  images and reviewed the radiologist interpretations:    CT CHEST PULMONARY EMBOLISM W CONTRAST    (Results Pending)       Labs:  Results for orders placed or performed during the hospital encounter of 06/15/24   CBC with Auto Differential   Result Value Ref Range    WBC 12.0 (H) 3.5 - 11.0 k/uL    RBC 4.70 4.5 - 5.9 m/uL    Hemoglobin 13.1 (L) 13.5 - 17.5 g/dL    Hematocrit 40.5 (L) 41 - 53 %    MCV 86.3 80 - 100 fL    MCH 28.0 26 - 34 pg    MCHC 32.4 31 - 37 g/dL    RDW 16.0 (H) 12.5 - 15.4 %    Platelets 359 140 - 450 k/uL    MPV 7.6 6.0 - 12.0 fL    Neutrophils % 74 (H) 36 - 66 %    Lymphocytes % 19 (L) 24 - 44 %    Monocytes % 6 2 - 11 %    Eosinophils % 0 (L) 1 - 4 %    Basophils % 1 0 - 2 %    Neutrophils Absolute 8.90 (H) 1.8 - 7.7 k/uL    Lymphocytes Absolute 2.30 1.0 - 4.8 k/uL    Monocytes Absolute 0.70 0.1 - 1.2 k/uL    Eosinophils Absolute 0.00 0.0 - 0.4 k/uL    Basophils Absolute 0.10 0.0 - 0.2 k/uL   CMP   Result Value Ref Range    Sodium 139 135 - 144 mmol/L    Potassium 4.0 3.7 - 5.3 mmol/L    Chloride 105 98 - 107 mmol/L    CO2 22 20 - 31 mmol/L    Anion Gap 12 9 - 17 mmol/L    Glucose 131 (H) 70 - 99 mg/dL    BUN 13 6 - 20 mg/dL    Creatinine

## 2024-06-15 NOTE — ED PROVIDER NOTES
[facility] Emergency Department  [address]  [place] [address]  Phone: [phone]  Fax: [phone]      Attending Physician Attestation    I performed a history and physical examination of the patient and discussed management with the mid level provider. I reviewed the mid level provider's note and agree with the documented findings and plan of care. Any areas of disagreement are noted on the chart. I was personally present for the key portions of any procedures. I have documented in the chart those procedures where I was not present during the key portions. I have reviewed the emergency nurses triage note. I agree with the chief complaint, past medical history, past surgical history, allergies, medications, social and family history as documented unless otherwise noted below. Documentation of the HPI, Physical Exam and Medical Decision Making performed by mid level providers is based on my personal performance of the HPI, PE and MDM. For Physician Assistant/ Nurse Practitioner cases/documentation I have personally evaluated this patient and have completed at least one if not all key elements of the E/M (history, physical exam, and MDM). Additional findings are as noted.      CHIEF COMPLAINT       Chief Complaint   Patient presents with    Shortness of Breath    Leg Swelling     Shortness of breath and bilateral leg swelling.  Shortness of breath started 1 day ago.  Leg swelling onset several days ago.          HISTORY OF PRESENT ILLNESS    Jesús Min is a 49 y.o. male who presents complaining of a 2-day history of exertional dyspnea as well past peripheral edema.  He tells me that he was here approximately 2 weeks ago was diagnosed with a COPD exacerbation.  He was placed on steroids as well as antibiotics.  He did follow-up with the urgent care who placed him on another course of steroids.  He reports that the chest congestion and cough are greatly improved but over      EKG: All EKG's are interpreted by the Emergency Department Physician who either signs or Co-signs this chart in the absence of a cardiologist.    EKG is inter by myself showing sinus tachycardia with a rate of 108.  He has nonspecific intraventricular conduction delay and Q waves in the inferior leads.  He also does have abnormal R wave progression through the anterior leads extending laterally.    RADIOLOGY:   Non-plain film images such as CT, Ultrasound and MRI are read by the radiologist. Plain radiographic images are visualized and the radiologist interpretations are reviewed as follows:     CT chest PE protocol is interpreted by myself showing bilateral pleural effusions worse on the right when compared to the left as well as mild pulmonary edema.  I do not appreciate an acute PE.    LABS:  Results for orders placed or performed during the hospital encounter of 06/15/24   CBC with Auto Differential   Result Value Ref Range    WBC 12.0 (H) 3.5 - 11.0 k/uL    RBC 4.70 4.5 - 5.9 m/uL    Hemoglobin 13.1 (L) 13.5 - 17.5 g/dL    Hematocrit 40.5 (L) 41 - 53 %    MCV 86.3 80 - 100 fL    MCH 28.0 26 - 34 pg    MCHC 32.4 31 - 37 g/dL    RDW 16.0 (H) 12.5 - 15.4 %    Platelets 359 140 - 450 k/uL    MPV 7.6 6.0 - 12.0 fL    Neutrophils % 74 (H) 36 - 66 %    Lymphocytes % 19 (L) 24 - 44 %    Monocytes % 6 2 - 11 %    Eosinophils % 0 (L) 1 - 4 %    Basophils % 1 0 - 2 %    Neutrophils Absolute 8.90 (H) 1.8 - 7.7 k/uL    Lymphocytes Absolute 2.30 1.0 - 4.8 k/uL    Monocytes Absolute 0.70 0.1 - 1.2 k/uL    Eosinophils Absolute 0.00 0.0 - 0.4 k/uL    Basophils Absolute 0.10 0.0 - 0.2 k/uL   CMP   Result Value Ref Range    Sodium 139 135 - 144 mmol/L    Potassium 4.0 3.7 - 5.3 mmol/L    Chloride 105 98 - 107 mmol/L    CO2 22 20 - 31 mmol/L    Anion Gap 12 9 - 17 mmol/L    Glucose 131 (H) 70 - 99 mg/dL    BUN 13 6 - 20 mg/dL    Creatinine 0.8 0.7 - 1.2 mg/dL    Est, Glom Filt Rate >90 >60 mL/min/1.73m2    Calcium 9.1 8.6 -

## 2024-06-15 NOTE — H&P
[facility]  Office: [phone]  Francis Wynne DO, Barry Devries DO, Wei Downing DO, Khris Scott DO, David Evans MD, Kenia Gillis MD, Cecil Hampton MD, Jessy Franco MD,  Leroy Hill MD, Florecita Moran MD, Dylan Yeager MD,  Rio Collazo DO, Taisha Andrew MD, Donn Parks MD, Paul Wynen DO, Olimpia Hernandez MD,  Zia Dowd DO, Bindu Danielson MD, Any Miller MD, Sherly Angeles MD, Cl Alexander MD,  Jorge Luis Bahena MD, Robin Wilson MD, Bubba Candelario MD, Dahlia Stapleton MD, Elder Rios MD, Dinesh Ledezma MD, Raj Jenkins DO, Ranjan Rashid DO, Aubrey Rodriguez MD,  Edu Vazquez MD, Shirley Waterhouse, CNP,  Chelle Perdomo CNP, Ricardo Webb, CNP,  Ellen Jacinto, DNP, Bess Tao, CNP, Saira Taveras, CNP, Francine June, CNP, Kati Toussaint, CNP, Sidra García, PA-C, Farrah Jose PA-C, Ronel Hudson, CNP, Kendal Flor, CNP, Prakash Gardiner, CNP, Mayela Sweeney, CNP, Darlyn Izaguirre, CNP, Shauna Gibbons, CNS, Priyanka Maldonado, CNP, Jacquelin Bucio CNP, Tracy Schwab, CNP         [facility]   IN-PATIENT SERVICE   [facility]    HISTORY AND PHYSICAL EXAMINATION            Date:   6/15/2024  Patient name:  Jesús Min  Date of admission:  6/15/2024  2:51 PM  MRN:   [id number]  Account:  [id number]  YOB: 1975  PCP:    No primary care provider on file.  Room:   2TRAUMA/2TRAUMA  Code Status:    No Order      History Obtained From:     patient    History of Present Illness:     Jesús Min is a 49 y.o. male with a past medical history of CAD (s/p stent placement in 2005) and tobacco abuse who presented to the emergency department on 6/15/2024 complaining of shortness of breath and leg swelling. The patient states that his symptoms began about a week ago and have progressively worsened. The patient states that he had a heart attack and required stent placement when he was 30 but has not seen a doctor since then. In the ED,  no acute distress  Mental status: oriented to person, place, and time  Head:  normocephalic, atraumatic  Eye: no icterus, redness, pupils equal and reactive, extraocular eye movements intact, conjunctiva clear  Ear: normal external ear, no discharge, hearing intact  Nose:  no drainage noted  Mouth: mucous membranes moist  Neck: supple, no carotid bruits, thyroid not palpable  Lungs: Bilateral equal air entry, clear to ausculation, no wheezing, rales or rhonchi, normal effort  Cardiovascular: normal rate, regular rhythm, no murmur, gallop, rub  Abdomen: Soft, nontender, nondistended, normal bowel sounds, no hepatomegaly or splenomegaly  Neurologic: There are no new focal motor or sensory deficits, normal muscle tone and bulk, no abnormal sensation, normal speech, cranial nerves II through XII grossly intact  Skin: No gross lesions, rashes, bruising or bleeding on exposed skin area  Extremities:  3+ pitting edema appreciated in the BLLE.   Psych: normal affect     Investigations:      Laboratory Testing:  Recent Results (from the past 24 hour(s))   CBC with Auto Differential    Collection Time: 06/15/24  3:25 PM   Result Value Ref Range    WBC 12.0 (H) 3.5 - 11.0 k/uL    RBC 4.70 4.5 - 5.9 m/uL    Hemoglobin 13.1 (L) 13.5 - 17.5 g/dL    Hematocrit 40.5 (L) 41 - 53 %    MCV 86.3 80 - 100 fL    MCH 28.0 26 - 34 pg    MCHC 32.4 31 - 37 g/dL    RDW 16.0 (H) 12.5 - 15.4 %    Platelets 359 140 - 450 k/uL    MPV 7.6 6.0 - 12.0 fL    Neutrophils % 74 (H) 36 - 66 %    Lymphocytes % 19 (L) 24 - 44 %    Monocytes % 6 2 - 11 %    Eosinophils % 0 (L) 1 - 4 %    Basophils % 1 0 - 2 %    Neutrophils Absolute 8.90 (H) 1.8 - 7.7 k/uL    Lymphocytes Absolute 2.30 1.0 - 4.8 k/uL    Monocytes Absolute 0.70 0.1 - 1.2 k/uL    Eosinophils Absolute 0.00 0.0 - 0.4 k/uL    Basophils Absolute 0.10 0.0 - 0.2 k/uL   CMP    Collection Time: 06/15/24  3:25 PM   Result Value Ref Range    Sodium 139 135 - 144 mmol/L    Potassium 4.0 3.7 - 5.3 mmol/L

## 2024-06-15 NOTE — RT PROTOCOL NOTE
RT Inhaler-Nebulizer Bronchodilator Protocol Note    There is a bronchodilator order in the chart from a provider indicating to follow the RT Bronchodilator Protocol and there is an “Initiate RT Inhaler-Nebulizer Bronchodilator Protocol” order as well (see protocol at bottom of note).    CXR Findings:  No results found.    The findings from the last RT Protocol Assessment were as follows:   History Pulmonary Disease: Smoker 15 pack years or more  Respiratory Pattern: Regular pattern and RR 12-20 bpm  Breath Sounds: Clear breath sounds  Cough: Strong, productive  Indication for Bronchodilator Therapy:    Bronchodilator Assessment Score: 2    Aerosolized bronchodilator medication orders have been revised according to the RT Inhaler-Nebulizer Bronchodilator Protocol below.    Respiratory Therapist to perform RT Therapy Protocol Assessment initially then follow the protocol.  Repeat RT Therapy Protocol Assessment PRN for score 0-3 or on second treatment, BID, and PRN for scores above 3.    No Indications - adjust the frequency to every 6 hours PRN wheezing or bronchospasm, if no treatments needed after 48 hours then discontinue using Per Protocol order mode.     If indication present, adjust the RT bronchodilator orders based on the Bronchodilator Assessment Score as indicated below.  Use Inhaler orders unless patient has one or more of the following: on home nebulizer, not able to hold breath for 10 seconds, is not alert and oriented, cannot activate and use MDI correctly, or respiratory rate 25 breaths per minute or more, then use the equivalent nebulizer order(s) with same Frequency and PRN reasons based on the score.  If a patient is on this medication at home then do not decrease Frequency below that used at home.    0-3 - enter or revise RT bronchodilator order(s) to equivalent RT Bronchodilator order with Frequency of every 4 hours PRN for wheezing or increased work of breathing using Per Protocol order mode.

## 2024-06-16 LAB
ANION GAP SERPL CALCULATED.3IONS-SCNC: 12 MMOL/L (ref 9–17)
BASOPHILS # BLD: 0.1 K/UL (ref 0–0.2)
BASOPHILS NFR BLD: 1 % (ref 0–2)
BUN SERPL-MCNC: 15 MG/DL (ref 6–20)
CALCIUM SERPL-MCNC: 9.1 MG/DL (ref 8.6–10.4)
CHLORIDE SERPL-SCNC: 108 MMOL/L (ref 98–107)
CO2 SERPL-SCNC: 22 MMOL/L (ref 20–31)
CREAT SERPL-MCNC: 0.9 MG/DL (ref 0.7–1.2)
EOSINOPHIL # BLD: 0.1 K/UL (ref 0–0.4)
EOSINOPHILS RELATIVE PERCENT: 1 % (ref 1–4)
ERYTHROCYTE [DISTWIDTH] IN BLOOD BY AUTOMATED COUNT: 16 % (ref 12.5–15.4)
GFR, ESTIMATED: >90 ML/MIN/1.73M2
GLUCOSE SERPL-MCNC: 134 MG/DL (ref 70–99)
HCT VFR BLD AUTO: 39.1 % (ref 41–53)
HGB BLD-MCNC: 12.8 G/DL (ref 13.5–17.5)
LYMPHOCYTES NFR BLD: 2.8 K/UL (ref 1–4.8)
LYMPHOCYTES RELATIVE PERCENT: 23 % (ref 24–44)
MCH RBC QN AUTO: 28.3 PG (ref 26–34)
MCHC RBC AUTO-ENTMCNC: 32.9 G/DL (ref 31–37)
MCV RBC AUTO: 86 FL (ref 80–100)
MONOCYTES NFR BLD: 0.7 K/UL (ref 0.1–1.2)
MONOCYTES NFR BLD: 6 % (ref 2–11)
NEUTROPHILS NFR BLD: 69 % (ref 36–66)
NEUTS SEG NFR BLD: 8.4 K/UL (ref 1.8–7.7)
PARTIAL THROMBOPLASTIN TIME: 26.1 SEC (ref 21.3–31.3)
PARTIAL THROMBOPLASTIN TIME: 32.3 SEC (ref 21.3–31.3)
PARTIAL THROMBOPLASTIN TIME: 32.3 SEC (ref 21.3–31.3)
PARTIAL THROMBOPLASTIN TIME: 34.8 SEC (ref 21.3–31.3)
PLATELET # BLD AUTO: 301 K/UL (ref 140–450)
PMV BLD AUTO: 7.6 FL (ref 6–12)
POTASSIUM SERPL-SCNC: 4.2 MMOL/L (ref 3.7–5.3)
RBC # BLD AUTO: 4.54 M/UL (ref 4.5–5.9)
SODIUM SERPL-SCNC: 142 MMOL/L (ref 135–144)
TROPONIN I SERPL HS-MCNC: 796 NG/L (ref 0–22)
WBC OTHER # BLD: 12.2 K/UL (ref 3.5–11)

## 2024-06-16 PROCEDURE — 94640 AIRWAY INHALATION TREATMENT: CPT

## 2024-06-16 PROCEDURE — 6370000000 HC RX 637 (ALT 250 FOR IP): Performed by: NURSE PRACTITIONER

## 2024-06-16 PROCEDURE — 6370000000 HC RX 637 (ALT 250 FOR IP): Performed by: INTERNAL MEDICINE

## 2024-06-16 PROCEDURE — 99232 SBSQ HOSP IP/OBS MODERATE 35: CPT | Performed by: STUDENT IN AN ORGANIZED HEALTH CARE EDUCATION/TRAINING PROGRAM

## 2024-06-16 PROCEDURE — 94761 N-INVAS EAR/PLS OXIMETRY MLT: CPT

## 2024-06-16 PROCEDURE — 84484 ASSAY OF TROPONIN QUANT: CPT

## 2024-06-16 PROCEDURE — 99255 IP/OBS CONSLTJ NEW/EST HI 80: CPT | Performed by: INTERNAL MEDICINE

## 2024-06-16 PROCEDURE — 6360000002 HC RX W HCPCS: Performed by: STUDENT IN AN ORGANIZED HEALTH CARE EDUCATION/TRAINING PROGRAM

## 2024-06-16 PROCEDURE — 2060000000 HC ICU INTERMEDIATE R&B

## 2024-06-16 PROCEDURE — 6370000000 HC RX 637 (ALT 250 FOR IP): Performed by: STUDENT IN AN ORGANIZED HEALTH CARE EDUCATION/TRAINING PROGRAM

## 2024-06-16 PROCEDURE — 85730 THROMBOPLASTIN TIME PARTIAL: CPT

## 2024-06-16 PROCEDURE — 80048 BASIC METABOLIC PNL TOTAL CA: CPT

## 2024-06-16 PROCEDURE — 36415 COLL VENOUS BLD VENIPUNCTURE: CPT

## 2024-06-16 PROCEDURE — 2580000003 HC RX 258: Performed by: STUDENT IN AN ORGANIZED HEALTH CARE EDUCATION/TRAINING PROGRAM

## 2024-06-16 PROCEDURE — 85025 COMPLETE CBC W/AUTO DIFF WBC: CPT

## 2024-06-16 RX ORDER — ASPIRIN 81 MG/1
81 TABLET, CHEWABLE ORAL DAILY
Status: DISCONTINUED | OUTPATIENT
Start: 2024-06-16 | End: 2024-06-18 | Stop reason: HOSPADM

## 2024-06-16 RX ORDER — ATORVASTATIN CALCIUM 40 MG/1
80 TABLET, FILM COATED ORAL NIGHTLY
Status: DISCONTINUED | OUTPATIENT
Start: 2024-06-16 | End: 2024-06-18 | Stop reason: HOSPADM

## 2024-06-16 RX ORDER — IPRATROPIUM BROMIDE AND ALBUTEROL SULFATE 2.5; .5 MG/3ML; MG/3ML
1 SOLUTION RESPIRATORY (INHALATION)
Status: DISCONTINUED | OUTPATIENT
Start: 2024-06-16 | End: 2024-06-17

## 2024-06-16 RX ORDER — IPRATROPIUM BROMIDE AND ALBUTEROL SULFATE 2.5; .5 MG/3ML; MG/3ML
1 SOLUTION RESPIRATORY (INHALATION)
Status: DISCONTINUED | OUTPATIENT
Start: 2024-06-16 | End: 2024-06-16

## 2024-06-16 RX ADMIN — ATORVASTATIN CALCIUM 80 MG: 40 TABLET, FILM COATED ORAL at 22:30

## 2024-06-16 RX ADMIN — HEPARIN SODIUM 2000 UNITS: 1000 INJECTION INTRAVENOUS; SUBCUTANEOUS at 22:32

## 2024-06-16 RX ADMIN — HEPARIN SODIUM 2000 UNITS: 1000 INJECTION INTRAVENOUS; SUBCUTANEOUS at 15:42

## 2024-06-16 RX ADMIN — IPRATROPIUM BROMIDE AND ALBUTEROL SULFATE 1 DOSE: 2.5; .5 SOLUTION RESPIRATORY (INHALATION) at 20:38

## 2024-06-16 RX ADMIN — SODIUM CHLORIDE, PRESERVATIVE FREE 10 ML: 5 INJECTION INTRAVENOUS at 21:00

## 2024-06-16 RX ADMIN — HEPARIN SODIUM 14 UNITS/KG/HR: 10000 INJECTION, SOLUTION INTRAVENOUS at 12:50

## 2024-06-16 RX ADMIN — HYDROXYZINE HYDROCHLORIDE 25 MG: 25 TABLET, FILM COATED ORAL at 15:57

## 2024-06-16 RX ADMIN — HEPARIN SODIUM 2000 UNITS: 1000 INJECTION INTRAVENOUS; SUBCUTANEOUS at 08:36

## 2024-06-16 RX ADMIN — IPRATROPIUM BROMIDE AND ALBUTEROL SULFATE 1 DOSE: 2.5; .5 SOLUTION RESPIRATORY (INHALATION) at 11:06

## 2024-06-16 RX ADMIN — SODIUM CHLORIDE, PRESERVATIVE FREE 10 ML: 5 INJECTION INTRAVENOUS at 09:14

## 2024-06-16 RX ADMIN — HEPARIN SODIUM 4000 UNITS: 1000 INJECTION INTRAVENOUS; SUBCUTANEOUS at 00:35

## 2024-06-16 RX ADMIN — ASPIRIN 81 MG: 81 TABLET, CHEWABLE ORAL at 09:13

## 2024-06-16 RX ADMIN — FUROSEMIDE 40 MG: 10 INJECTION, SOLUTION INTRAMUSCULAR; INTRAVENOUS at 09:13

## 2024-06-16 RX ADMIN — FUROSEMIDE 40 MG: 10 INJECTION, SOLUTION INTRAMUSCULAR; INTRAVENOUS at 17:15

## 2024-06-16 RX ADMIN — HYDROXYZINE HYDROCHLORIDE 25 MG: 25 TABLET, FILM COATED ORAL at 23:42

## 2024-06-16 NOTE — PLAN OF CARE
Problem: Respiratory - Adult  Goal: Achieves optimal ventilation and oxygenation  6/16/2024 1518 by Nathaly Yoo, RN  Outcome: Progressing  Flowsheets (Taken 6/16/2024 0054 by Kitty Jimenes, RN)  Achieves optimal ventilation and oxygenation:   Assess for changes in respiratory status   Position to facilitate oxygenation and minimize respiratory effort   Initiate smoking cessation protocol as indicated   Oxygen supplementation based on oxygen saturation or arterial blood gases  6/16/2024 1112 by Maria Bill RCP  Outcome: Progressing     Problem: Cardiovascular - Adult  Goal: Maintains optimal cardiac output and hemodynamic stability  Outcome: Progressing  Flowsheets (Taken 6/16/2024 0054 by Kitty Jimenes, RN)  Maintains optimal cardiac output and hemodynamic stability:   Monitor blood pressure and heart rate   Monitor urine output and notify Licensed Independent Practitioner for values outside of normal range   Assess for signs of decreased cardiac output  Goal: Absence of cardiac dysrhythmias or at baseline  Outcome: Progressing  Flowsheets (Taken 6/16/2024 1518)  Absence of cardiac dysrhythmias or at baseline:   Monitor cardiac rate and rhythm   Assess for signs of decreased cardiac output

## 2024-06-16 NOTE — CONSULTS
Cardiology Consultation         Date:   6/16/2024  Patient name: Jesús Min  Date of admission:  6/15/2024  2:51 PM  MRN:   [id number]  YOB: 1975    Reason for Admission: NSTEMI     Chief Complaint: dyspnea, leg edema     History of present illness:     49 yr old male with hx of CAD post remote PCI, records not available, has not seen any provider for several years, not on any meds at home, presented with worsening dyspnea on exertion and bilateral LE edema x 2 weeks. Denies any chest pain or angina. Initial ECG showed sinus tach with IVCD. HS troponins elevated with upward trend suggesting NSTEMI. Patient had volume overload on examination and started on iv lasix. This morning he appears comfortable but dyspneic and orthopneic. Reports feeling better and is hemodynamically stable otherwise.     Past Medical History:   has a past medical history of Heart attack (HCC).    Past Surgical History:   has a past surgical history that includes Cardiac catheterization.     Home Medications:    Prior to Admission medications    Medication Sig Start Date End Date Taking? Authorizing Provider   predniSONE (DELTASONE) 20 MG tablet Take 1 tablet by mouth 2 times daily for 5 days 6/11/24 6/16/24  Ania Serrato APRN - CNP   guaiFENesin (MUCINEX) 600 MG extended release tablet Take 1 tablet by mouth 2 times daily for 15 days 6/11/24 6/26/24  Ania Serrato APRN - CNP   fluticasone (FLONASE) 50 MCG/ACT nasal spray 2 sprays by Each Nostril route daily 6/11/24   Ania Serrato APRN - CNP   azithromycin (ZITHROMAX Z-IVAN) 250 MG tablet Take 2 tablets (500 mg) on Day 1, and then take 1 tablet (250 mg) on days 2 through 5. 6/11/24   Ania Serrato APRN - CNP   albuterol sulfate HFA (VENTOLIN HFA) 108 (90 Base) MCG/ACT inhaler Inhale 2 puffs into the lungs 4 times daily as needed for Wheezing 5/26/24   Alison Nova APRN - CNP       Allergies:  Codeine and Pcn [penicillins]    Social

## 2024-06-16 NOTE — PLAN OF CARE
Problem: Discharge Planning  Goal: Discharge to home or other facility with appropriate resources  Outcome: Progressing  Flowsheets (Taken 6/16/2024 0054)  Discharge to home or other facility with appropriate resources:   Identify barriers to discharge with patient and caregiver   Identify discharge learning needs (meds, wound care, etc)     Problem: Safety - Adult  Goal: Free from fall injury  Outcome: Progressing  Flowsheets (Taken 6/16/2024 0054)  Free From Fall Injury: Instruct family/caregiver on patient safety     Problem: Respiratory - Adult  Goal: Achieves optimal ventilation and oxygenation  Outcome: Progressing  Flowsheets (Taken 6/16/2024 0054)  Achieves optimal ventilation and oxygenation:   Assess for changes in respiratory status   Position to facilitate oxygenation and minimize respiratory effort   Initiate smoking cessation protocol as indicated   Oxygen supplementation based on oxygen saturation or arterial blood gases     Problem: Cardiovascular - Adult  Goal: Maintains optimal cardiac output and hemodynamic stability  Outcome: Progressing  Flowsheets (Taken 6/16/2024 0054)  Maintains optimal cardiac output and hemodynamic stability:   Monitor blood pressure and heart rate   Monitor urine output and notify Licensed Independent Practitioner for values outside of normal range   Assess for signs of decreased cardiac output     Problem: Cardiovascular - Adult  Goal: Absence of cardiac dysrhythmias or at baseline  Outcome: Progressing  Flowsheets (Taken 6/16/2024 0054)  Absence of cardiac dysrhythmias or at baseline: Monitor cardiac rate and rhythm

## 2024-06-16 NOTE — RT PROTOCOL NOTE
RT Inhaler-Nebulizer Bronchodilator Protocol Note    There is a bronchodilator order in the chart from a provider indicating to follow the RT Bronchodilator Protocol and there is an “Initiate RT Inhaler-Nebulizer Bronchodilator Protocol” order as well (see protocol at bottom of note).    CXR Findings:  No results found.    The findings from the last RT Protocol Assessment were as follows:   History Pulmonary Disease: Smoker 15 pack years or more  Respiratory Pattern: Regular pattern and RR 12-20 bpm  Breath Sounds: Slightly diminished and/or crackles  Cough: Strong, productive  Indication for Bronchodilator Therapy:    Bronchodilator Assessment Score: 4    Aerosolized bronchodilator medication orders have been revised according to the RT Inhaler-Nebulizer Bronchodilator Protocol below.    Respiratory Therapist to perform RT Therapy Protocol Assessment initially then follow the protocol.  Repeat RT Therapy Protocol Assessment PRN for score 0-3 or on second treatment, BID, and PRN for scores above 3.    No Indications - adjust the frequency to every 6 hours PRN wheezing or bronchospasm, if no treatments needed after 48 hours then discontinue using Per Protocol order mode.     If indication present, adjust the RT bronchodilator orders based on the Bronchodilator Assessment Score as indicated below.  Use Inhaler orders unless patient has one or more of the following: on home nebulizer, not able to hold breath for 10 seconds, is not alert and oriented, cannot activate and use MDI correctly, or respiratory rate 25 breaths per minute or more, then use the equivalent nebulizer order(s) with same Frequency and PRN reasons based on the score.  If a patient is on this medication at home then do not decrease Frequency below that used at home.    0-3 - enter or revise RT bronchodilator order(s) to equivalent RT Bronchodilator order with Frequency of every 4 hours PRN for wheezing or increased work of breathing using Per Protocol  order mode.        4-6 - enter or revise RT Bronchodilator order(s) to two equivalent RT bronchodilator orders with one order with BID Frequency and one order with Frequency of every 4 hours PRN wheezing or increased work of breathing using Per Protocol order mode.        7-10 - enter or revise RT Bronchodilator order(s) to two equivalent RT bronchodilator orders with one order with TID Frequency and one order with Frequency of every 4 hours PRN wheezing or increased work of breathing using Per Protocol order mode.       11-13 - enter or revise RT Bronchodilator order(s) to one equivalent RT bronchodilator order with QID Frequency and an Albuterol order with Frequency of every 4 hours PRN wheezing or increased work of breathing using Per Protocol order mode.      Greater than 13 - enter or revise RT Bronchodilator order(s) to one equivalent RT bronchodilator order with every 4 hours Frequency and an Albuterol order with Frequency of every 2 hours PRN wheezing or increased work of breathing using Per Protocol order mode.     RT to enter RT Home Evaluation for COPD & MDI Assessment order using Per Protocol order mode.    Electronically signed by Maria Bill RCP on 6/16/2024 at 11:13 AM

## 2024-06-17 ENCOUNTER — APPOINTMENT (OUTPATIENT)
Age: 49
End: 2024-06-17
Attending: STUDENT IN AN ORGANIZED HEALTH CARE EDUCATION/TRAINING PROGRAM
Payer: COMMERCIAL

## 2024-06-17 LAB
ANION GAP SERPL CALCULATED.3IONS-SCNC: 12 MMOL/L (ref 9–17)
BASOPHILS # BLD: 0.2 K/UL (ref 0–0.2)
BASOPHILS NFR BLD: 2 % (ref 0–2)
BUN SERPL-MCNC: 19 MG/DL (ref 6–20)
CALCIUM SERPL-MCNC: 9.1 MG/DL (ref 8.6–10.4)
CHLORIDE SERPL-SCNC: 107 MMOL/L (ref 98–107)
CO2 SERPL-SCNC: 26 MMOL/L (ref 20–31)
CREAT SERPL-MCNC: 1 MG/DL (ref 0.7–1.2)
ECHO AO ROOT DIAM: 3.4 CM
ECHO AO ROOT INDEX: 1.53 CM/M2
ECHO AV AREA PEAK VELOCITY: 3.3 CM2
ECHO AV AREA/BSA PEAK VELOCITY: 1.5 CM2/M2
ECHO AV PEAK GRADIENT: 6 MMHG
ECHO AV PEAK VELOCITY: 1.2 M/S
ECHO AV VELOCITY RATIO: 0.75
ECHO BSA: 2.32 M2
ECHO EST RA PRESSURE: 3 MMHG
ECHO IVC EXP: 1.7 CM
ECHO LA AREA 2C: 20.1 CM2
ECHO LA AREA 4C: 18.7 CM2
ECHO LA DIAMETER INDEX: 2.12 CM/M2
ECHO LA DIAMETER: 4.7 CM
ECHO LA MAJOR AXIS: 5.6 CM
ECHO LA MINOR AXIS: 5.4 CM
ECHO LA TO AORTIC ROOT RATIO: 1.38
ECHO LA VOL BP: 56 ML (ref 18–58)
ECHO LA VOL MOD A2C: 62 ML (ref 18–58)
ECHO LA VOL MOD A4C: 51 ML (ref 18–58)
ECHO LA VOL/BSA BIPLANE: 25 ML/M2 (ref 16–34)
ECHO LA VOLUME INDEX MOD A2C: 28 ML/M2 (ref 16–34)
ECHO LA VOLUME INDEX MOD A4C: 23 ML/M2 (ref 16–34)
ECHO LV E' LATERAL VELOCITY: 11 CM/S
ECHO LV E' SEPTAL VELOCITY: 8 CM/S
ECHO LV EDV A2C: 294 ML
ECHO LV EDV A4C: 244 ML
ECHO LV EDV INDEX A4C: 110 ML/M2
ECHO LV EDV NDEX A2C: 132 ML/M2
ECHO LV EJECTION FRACTION A2C: 22 %
ECHO LV EJECTION FRACTION A4C: 12 %
ECHO LV EJECTION FRACTION BIPLANE: 17 % (ref 55–100)
ECHO LV ESV A2C: 229 ML
ECHO LV ESV A4C: 216 ML
ECHO LV ESV INDEX A2C: 103 ML/M2
ECHO LV ESV INDEX A4C: 97 ML/M2
ECHO LV FRACTIONAL SHORTENING: 21 % (ref 28–44)
ECHO LV INTERNAL DIMENSION DIASTOLE INDEX: 2.84 CM/M2
ECHO LV INTERNAL DIMENSION DIASTOLIC: 6.3 CM (ref 4.2–5.9)
ECHO LV INTERNAL DIMENSION SYSTOLIC INDEX: 2.25 CM/M2
ECHO LV INTERNAL DIMENSION SYSTOLIC: 5 CM
ECHO LV IVSD: 1.1 CM (ref 0.6–1)
ECHO LV MASS 2D: 359.5 G (ref 88–224)
ECHO LV MASS INDEX 2D: 162 G/M2 (ref 49–115)
ECHO LV POSTERIOR WALL DIASTOLIC: 1.4 CM (ref 0.6–1)
ECHO LV RELATIVE WALL THICKNESS RATIO: 0.44
ECHO LVOT AREA: 4.2 CM2
ECHO LVOT DIAM: 2.3 CM
ECHO LVOT MEAN GRADIENT: 1 MMHG
ECHO LVOT PEAK GRADIENT: 3 MMHG
ECHO LVOT PEAK VELOCITY: 0.9 M/S
ECHO LVOT STROKE VOLUME INDEX: 28.6 ML/M2
ECHO LVOT SV: 63.5 ML
ECHO LVOT VTI: 15.3 CM
ECHO MV A VELOCITY: 0.33 M/S
ECHO MV E DECELERATION TIME (DT): 129 MS
ECHO MV E VELOCITY: 1.08 M/S
ECHO MV E/A RATIO: 3.27
ECHO MV E/E' LATERAL: 9.82
ECHO MV E/E' RATIO (AVERAGED): 11.66
ECHO MV E/E' SEPTAL: 13.5
ECHO RIGHT VENTRICULAR SYSTOLIC PRESSURE (RVSP): 46 MMHG
ECHO RV TAPSE: 1.2 CM (ref 1.7–?)
ECHO TV REGURGITANT MAX VELOCITY: 3.28 M/S
ECHO TV REGURGITANT PEAK GRADIENT: 43 MMHG
EKG ATRIAL RATE: 117 BPM
EKG P AXIS: 67 DEGREES
EKG P-R INTERVAL: 160 MS
EKG Q-T INTERVAL: 362 MS
EKG QRS DURATION: 142 MS
EKG QTC CALCULATION (BAZETT): 504 MS
EKG R AXIS: 41 DEGREES
EKG T AXIS: -104 DEGREES
EKG VENTRICULAR RATE: 117 BPM
EOSINOPHIL # BLD: 0.2 K/UL (ref 0–0.4)
EOSINOPHILS RELATIVE PERCENT: 2 % (ref 1–4)
ERYTHROCYTE [DISTWIDTH] IN BLOOD BY AUTOMATED COUNT: 16 % (ref 12.5–15.4)
GFR, ESTIMATED: >90 ML/MIN/1.73M2
GLUCOSE SERPL-MCNC: 106 MG/DL (ref 70–99)
HCT VFR BLD AUTO: 38.9 % (ref 41–53)
HGB BLD-MCNC: 13.2 G/DL (ref 13.5–17.5)
LYMPHOCYTES NFR BLD: 3 K/UL (ref 1–4.8)
LYMPHOCYTES RELATIVE PERCENT: 26 % (ref 24–44)
MCH RBC QN AUTO: 28.9 PG (ref 26–34)
MCHC RBC AUTO-ENTMCNC: 33.9 G/DL (ref 31–37)
MCV RBC AUTO: 85.2 FL (ref 80–100)
MONOCYTES NFR BLD: 0.7 K/UL (ref 0.1–1.2)
MONOCYTES NFR BLD: 6 % (ref 2–11)
NEUTROPHILS NFR BLD: 64 % (ref 36–66)
NEUTS SEG NFR BLD: 7.6 K/UL (ref 1.8–7.7)
PARTIAL THROMBOPLASTIN TIME: 47.8 SEC (ref 21.3–31.3)
PARTIAL THROMBOPLASTIN TIME: 55.2 SEC (ref 21.3–31.3)
PLATELET # BLD AUTO: 290 K/UL (ref 140–450)
PMV BLD AUTO: 8 FL (ref 6–12)
POTASSIUM SERPL-SCNC: 3.7 MMOL/L (ref 3.7–5.3)
RBC # BLD AUTO: 4.57 M/UL (ref 4.5–5.9)
SODIUM SERPL-SCNC: 145 MMOL/L (ref 135–144)
WBC OTHER # BLD: 11.6 K/UL (ref 3.5–11)

## 2024-06-17 PROCEDURE — 6360000002 HC RX W HCPCS: Performed by: STUDENT IN AN ORGANIZED HEALTH CARE EDUCATION/TRAINING PROGRAM

## 2024-06-17 PROCEDURE — 36415 COLL VENOUS BLD VENIPUNCTURE: CPT

## 2024-06-17 PROCEDURE — 85025 COMPLETE CBC W/AUTO DIFF WBC: CPT

## 2024-06-17 PROCEDURE — 85730 THROMBOPLASTIN TIME PARTIAL: CPT

## 2024-06-17 PROCEDURE — 6360000002 HC RX W HCPCS: Performed by: INTERNAL MEDICINE

## 2024-06-17 PROCEDURE — 6370000000 HC RX 637 (ALT 250 FOR IP): Performed by: INTERNAL MEDICINE

## 2024-06-17 PROCEDURE — 93306 TTE W/DOPPLER COMPLETE: CPT | Performed by: INTERNAL MEDICINE

## 2024-06-17 PROCEDURE — 2500000003 HC RX 250 WO HCPCS: Performed by: INTERNAL MEDICINE

## 2024-06-17 PROCEDURE — B2111ZZ FLUOROSCOPY OF MULTIPLE CORONARY ARTERIES USING LOW OSMOLAR CONTRAST: ICD-10-PCS | Performed by: INTERNAL MEDICINE

## 2024-06-17 PROCEDURE — 2060000000 HC ICU INTERMEDIATE R&B

## 2024-06-17 PROCEDURE — 2580000003 HC RX 258: Performed by: STUDENT IN AN ORGANIZED HEALTH CARE EDUCATION/TRAINING PROGRAM

## 2024-06-17 PROCEDURE — 93458 L HRT ARTERY/VENTRICLE ANGIO: CPT | Performed by: INTERNAL MEDICINE

## 2024-06-17 PROCEDURE — 80048 BASIC METABOLIC PNL TOTAL CA: CPT

## 2024-06-17 PROCEDURE — 2709999900 HC NON-CHARGEABLE SUPPLY: Performed by: INTERNAL MEDICINE

## 2024-06-17 PROCEDURE — C1894 INTRO/SHEATH, NON-LASER: HCPCS | Performed by: INTERNAL MEDICINE

## 2024-06-17 PROCEDURE — B2151ZZ FLUOROSCOPY OF LEFT HEART USING LOW OSMOLAR CONTRAST: ICD-10-PCS | Performed by: INTERNAL MEDICINE

## 2024-06-17 PROCEDURE — 6360000004 HC RX CONTRAST MEDICATION: Performed by: INTERNAL MEDICINE

## 2024-06-17 PROCEDURE — 6370000000 HC RX 637 (ALT 250 FOR IP): Performed by: NURSE PRACTITIONER

## 2024-06-17 PROCEDURE — 4A023N7 MEASUREMENT OF CARDIAC SAMPLING AND PRESSURE, LEFT HEART, PERCUTANEOUS APPROACH: ICD-10-PCS | Performed by: INTERNAL MEDICINE

## 2024-06-17 PROCEDURE — 93306 TTE W/DOPPLER COMPLETE: CPT

## 2024-06-17 PROCEDURE — 99152 MOD SED SAME PHYS/QHP 5/>YRS: CPT | Performed by: INTERNAL MEDICINE

## 2024-06-17 PROCEDURE — 99232 SBSQ HOSP IP/OBS MODERATE 35: CPT | Performed by: STUDENT IN AN ORGANIZED HEALTH CARE EDUCATION/TRAINING PROGRAM

## 2024-06-17 RX ORDER — HEPARIN SODIUM 1000 [USP'U]/ML
4000 INJECTION, SOLUTION INTRAVENOUS; SUBCUTANEOUS PRN
Status: CANCELLED | OUTPATIENT
Start: 2024-06-17

## 2024-06-17 RX ORDER — POTASSIUM CHLORIDE 7.45 MG/ML
10 INJECTION INTRAVENOUS PRN
Status: CANCELLED | OUTPATIENT
Start: 2024-06-17

## 2024-06-17 RX ORDER — SODIUM CHLORIDE 0.9 % (FLUSH) 0.9 %
5-40 SYRINGE (ML) INJECTION PRN
Status: DISCONTINUED | OUTPATIENT
Start: 2024-06-17 | End: 2024-06-18 | Stop reason: HOSPADM

## 2024-06-17 RX ORDER — SODIUM CHLORIDE 0.9 % (FLUSH) 0.9 %
5-40 SYRINGE (ML) INJECTION PRN
Status: CANCELLED | OUTPATIENT
Start: 2024-06-17

## 2024-06-17 RX ORDER — HEPARIN SODIUM 10000 [USP'U]/100ML
5-30 INJECTION, SOLUTION INTRAVENOUS CONTINUOUS
Status: CANCELLED | OUTPATIENT
Start: 2024-06-17

## 2024-06-17 RX ORDER — ACETAMINOPHEN 325 MG/1
650 TABLET ORAL EVERY 4 HOURS PRN
Status: DISCONTINUED | OUTPATIENT
Start: 2024-06-17 | End: 2024-06-18 | Stop reason: HOSPADM

## 2024-06-17 RX ORDER — FUROSEMIDE 10 MG/ML
40 INJECTION INTRAMUSCULAR; INTRAVENOUS 2 TIMES DAILY
Status: CANCELLED | OUTPATIENT
Start: 2024-06-17

## 2024-06-17 RX ORDER — MAGNESIUM SULFATE IN WATER 40 MG/ML
2000 INJECTION, SOLUTION INTRAVENOUS PRN
Status: CANCELLED | OUTPATIENT
Start: 2024-06-17

## 2024-06-17 RX ORDER — ASPIRIN 81 MG/1
81 TABLET, CHEWABLE ORAL DAILY
Status: CANCELLED | OUTPATIENT
Start: 2024-06-18

## 2024-06-17 RX ORDER — HEPARIN SODIUM 1000 [USP'U]/ML
2000 INJECTION, SOLUTION INTRAVENOUS; SUBCUTANEOUS PRN
Status: CANCELLED | OUTPATIENT
Start: 2024-06-17

## 2024-06-17 RX ORDER — MIDAZOLAM HYDROCHLORIDE 1 MG/ML
INJECTION INTRAMUSCULAR; INTRAVENOUS PRN
Status: DISCONTINUED | OUTPATIENT
Start: 2024-06-17 | End: 2024-06-17 | Stop reason: HOSPADM

## 2024-06-17 RX ORDER — ALBUTEROL SULFATE 90 UG/1
2 AEROSOL, METERED RESPIRATORY (INHALATION) EVERY 4 HOURS PRN
Status: CANCELLED | OUTPATIENT
Start: 2024-06-17

## 2024-06-17 RX ORDER — SODIUM CHLORIDE 9 MG/ML
INJECTION, SOLUTION INTRAVENOUS PRN
Status: CANCELLED | OUTPATIENT
Start: 2024-06-17

## 2024-06-17 RX ORDER — HYDROXYZINE HYDROCHLORIDE 25 MG/1
25 TABLET, FILM COATED ORAL 3 TIMES DAILY PRN
Status: CANCELLED | OUTPATIENT
Start: 2024-06-17

## 2024-06-17 RX ORDER — ONDANSETRON 2 MG/ML
4 INJECTION INTRAMUSCULAR; INTRAVENOUS EVERY 6 HOURS PRN
Status: CANCELLED | OUTPATIENT
Start: 2024-06-17

## 2024-06-17 RX ORDER — IPRATROPIUM BROMIDE AND ALBUTEROL SULFATE 2.5; .5 MG/3ML; MG/3ML
1 SOLUTION RESPIRATORY (INHALATION) EVERY 4 HOURS PRN
Status: DISCONTINUED | OUTPATIENT
Start: 2024-06-17 | End: 2024-06-18 | Stop reason: HOSPADM

## 2024-06-17 RX ORDER — SODIUM CHLORIDE 0.9 % (FLUSH) 0.9 %
5-40 SYRINGE (ML) INJECTION EVERY 12 HOURS SCHEDULED
Status: CANCELLED | OUTPATIENT
Start: 2024-06-17

## 2024-06-17 RX ORDER — SODIUM CHLORIDE 9 MG/ML
INJECTION, SOLUTION INTRAVENOUS PRN
Status: DISCONTINUED | OUTPATIENT
Start: 2024-06-17 | End: 2024-06-18 | Stop reason: HOSPADM

## 2024-06-17 RX ORDER — POLYETHYLENE GLYCOL 3350 17 G/17G
17 POWDER, FOR SOLUTION ORAL DAILY PRN
Status: CANCELLED | OUTPATIENT
Start: 2024-06-17

## 2024-06-17 RX ORDER — ONDANSETRON 4 MG/1
4 TABLET, ORALLY DISINTEGRATING ORAL EVERY 8 HOURS PRN
Status: CANCELLED | OUTPATIENT
Start: 2024-06-17

## 2024-06-17 RX ORDER — SODIUM CHLORIDE 0.9 % (FLUSH) 0.9 %
10 SYRINGE (ML) INJECTION PRN
Status: CANCELLED | OUTPATIENT
Start: 2024-06-17

## 2024-06-17 RX ORDER — LIDOCAINE HYDROCHLORIDE 10 MG/ML
INJECTION, SOLUTION INFILTRATION; PERINEURAL PRN
Status: DISCONTINUED | OUTPATIENT
Start: 2024-06-17 | End: 2024-06-17 | Stop reason: HOSPADM

## 2024-06-17 RX ORDER — ACETAMINOPHEN 650 MG/1
650 SUPPOSITORY RECTAL EVERY 6 HOURS PRN
Status: CANCELLED | OUTPATIENT
Start: 2024-06-17

## 2024-06-17 RX ORDER — IPRATROPIUM BROMIDE AND ALBUTEROL SULFATE 2.5; .5 MG/3ML; MG/3ML
1 SOLUTION RESPIRATORY (INHALATION) EVERY 4 HOURS PRN
Status: CANCELLED | OUTPATIENT
Start: 2024-06-17

## 2024-06-17 RX ORDER — SODIUM CHLORIDE 0.9 % (FLUSH) 0.9 %
5-40 SYRINGE (ML) INJECTION EVERY 12 HOURS SCHEDULED
Status: DISCONTINUED | OUTPATIENT
Start: 2024-06-17 | End: 2024-06-18 | Stop reason: HOSPADM

## 2024-06-17 RX ORDER — POTASSIUM CHLORIDE 20 MEQ/1
40 TABLET, EXTENDED RELEASE ORAL PRN
Status: CANCELLED | OUTPATIENT
Start: 2024-06-17

## 2024-06-17 RX ORDER — ATORVASTATIN CALCIUM 40 MG/1
80 TABLET, FILM COATED ORAL NIGHTLY
Status: CANCELLED | OUTPATIENT
Start: 2024-06-17

## 2024-06-17 RX ORDER — ACETAMINOPHEN 325 MG/1
650 TABLET ORAL EVERY 6 HOURS PRN
Status: CANCELLED | OUTPATIENT
Start: 2024-06-17

## 2024-06-17 RX ADMIN — HEPARIN SODIUM 2000 UNITS: 1000 INJECTION INTRAVENOUS; SUBCUTANEOUS at 05:17

## 2024-06-17 RX ADMIN — FUROSEMIDE 40 MG: 10 INJECTION, SOLUTION INTRAMUSCULAR; INTRAVENOUS at 09:03

## 2024-06-17 RX ADMIN — FUROSEMIDE 40 MG: 10 INJECTION, SOLUTION INTRAMUSCULAR; INTRAVENOUS at 20:24

## 2024-06-17 RX ADMIN — HYDROXYZINE HYDROCHLORIDE 25 MG: 25 TABLET, FILM COATED ORAL at 23:59

## 2024-06-17 RX ADMIN — HEPARIN SODIUM 20 UNITS/KG/HR: 10000 INJECTION, SOLUTION INTRAVENOUS at 20:33

## 2024-06-17 RX ADMIN — SODIUM CHLORIDE, PRESERVATIVE FREE 10 ML: 5 INJECTION INTRAVENOUS at 20:27

## 2024-06-17 RX ADMIN — HEPARIN SODIUM 18 UNITS/KG/HR: 10000 INJECTION, SOLUTION INTRAVENOUS at 04:28

## 2024-06-17 RX ADMIN — ATORVASTATIN CALCIUM 80 MG: 40 TABLET, FILM COATED ORAL at 20:28

## 2024-06-17 RX ADMIN — ASPIRIN 81 MG: 81 TABLET, CHEWABLE ORAL at 09:03

## 2024-06-17 NOTE — PLAN OF CARE
Problem: Discharge Planning  Goal: Discharge to home or other facility with appropriate resources  Outcome: Progressing   Patient actively participates in ADLs, and decision making regarding plan of care  Problem: Safety - Adult  Goal: Free from fall injury  Outcome: Progressing   No falls/ injuries this shift, bed in lowest position, brakes on, call light in reach and used appropriately, side rails up x2  Problem: Respiratory - Adult  Goal: Achieves optimal ventilation and oxygenation  6/17/2024 0341 by Amado Godoy RN  Outcome: Progressing  Flowsheets (Taken 6/16/2024 2038 by Natasha Corea TASHA)  HOB elevated to promote optimal oxygenation, 2 L/NC applied when sleeping d/t O2 saturation 85-88%   Problem: Cardiovascular - Adult  Goal: Maintains optimal cardiac output and hemodynamic stability  6/17/2024 0341 by Amado Godoy, RN  Outcome: Progressing

## 2024-06-17 NOTE — CARE COORDINATION
Case Management Assessment  Initial Evaluation    Date/Time of Evaluation: 6/17/2024 12:55 PM  Assessment Completed by: Shanti Holder    If patient is discharged prior to next notation, then this note serves as note for discharge by case management.    Patient Name: Jesús Min                   YOB: 1975  Diagnosis: NSTEMI (non-ST elevated myocardial infarction) (HCC) [I21.4]  Chest pain, unspecified type [R07.9]                   Date / Time: 6/15/2024  2:51 PM    Patient Admission Status: Inpatient   Readmission Risk (Low < 19, Mod (19-27), High > 27): Readmission Risk Score: 5.2    Current PCP: No primary care provider on file.  PCP verified by CM? Yes    Chart Reviewed: Yes      History Provided by: Patient  Patient Orientation: Alert and Oriented    Patient Cognition: Alert    Hospitalization in the last 30 days (Readmission):  No    If yes, Readmission Assessment in CM Navigator will be completed.    Advance Directives:      Code Status: Full Code   Patient's Primary Decision Maker is: Legal Next of Kin (wife)      Discharge Planning:    Patient lives with: Spouse/Significant Other, Family Members Type of Home: Trailer/Mobile Home  Primary Care Giver: Self  Patient Support Systems include: Spouse/Significant Other, Family Members, Children   Current Financial resources: Medicaid  Current community resources: None  Current services prior to admission: None            Current DME:              Type of Home Care services:  None    ADLS  Prior functional level: Independent in ADLs/IADLs  Current functional level: Independent in ADLs/IADLs    PT AM-PAC:   /24  OT AM-PAC:   /24    Family can provide assistance at DC: Yes  Would you like Case Management to discuss the discharge plan with any other family members/significant others, and if so, who? No  Plans to Return to Present Housing: Yes  Other Identified Issues/Barriers to RETURNING to current housing: none  Potential Assistance needed at

## 2024-06-17 NOTE — PLAN OF CARE
Patient has diuresed well. He is now able to lay down flat. Will proceed with cardiac cath.   I discussed in detail the risks, benefits, and alternatives to the procedure including but not limited to risk of bleeding/hematoma requiring surgical intervention, contrast induced allergy and/or nephropathy, arrythmia, CVA, MI or death. The patient verbalized understanding and wishes to proceed.

## 2024-06-17 NOTE — CARE COORDINATION
Social work: still not bed at [facility] per RN. However sent paperwork to [employer] so that they will keep on \"[facility]\" until there is a bed. Advised [facility] that a heart monitor and possibly Heparin may be needed IV in route depending upon when a bed becomes available. [employer] phone: [phone].   Ellen enrique

## 2024-06-17 NOTE — DISCHARGE SUMMARY
[facility]  Office: [phone]  Francis Wynne DO, Barry Devries DO, Wei Downing DO, Khris Scott DO, David Evans MD, Kenia Gillis MD, Cecil Hampton MD, Jessy Franco MD,  Leroy Hill MD, Florecita Moran MD, Dylan Yeager MD,  Rio Collazo DO, Taisha Andrew MD, Donn Parks MD, Paul Wynne DO, Olimpia Hernandez MD,  Zia Dowd DO, Bindu Danielson MD, Any Miller MD, Sherly Angeles MD, Cl Alexander MD,  Jorge Luis Bahena MD, Robin Wilson MD, Bubba Candelario MD, Dahlia Stapleton MD, Elder Rios MD, Dinesh Ledezma MD, Raj Jenkins DO, Ranjan Rashid DO, Aubrey Rodriguez MD,  Edu Vazquez MD, Shirley Waterhouse, CNP,  Chelle Perdomo CNP, Ricardo Webb, CNP,  Ellen Jacinto, RED, Bess Tao, CNP, Saira Taveras, CNP, Francine June CNP, Kati Toussaint, CNP, Sidra García, PA-C, Farrah Jose PA-C, Ronel Hudson, CNP, Kendal Flor, CNP, Prakash Gardiner, CNP, Mayela Sweeney, CNP, Darlyn Izaguirre, CNP, Shauna Gibbons, CNS, Priyanka Maldonado, CNP, Jacquelin Bucio CNP, Tracy Schwab, CNP         [facility]   IN-PATIENT SERVICE   [facility]    Discharge Summary     Patient ID: Jesús Min  :  1975   MRN: [id number]     ACCOUNT:  [id number]   Patient's PCP: No primary care provider on file.  Admit Date: 6/15/2024   Discharge Date: 2024  Length of Stay: 2  Code Status:  Full Code  Admitting Physician: Donn Parks MD  Discharge Physician: Donn Parks MD     Active Discharge Diagnoses:     Hospital Problem Lists:  Principal Problem:    NSTEMI (non-ST elevated myocardial infarction) (HCC)  Active Problems:    New onset of congestive heart failure (HCC)  Resolved Problems:    * No resolved hospital problems. *      Admission Condition:  poor     Discharged Condition: fair    Hospital Stay:     Hospital Course:  Jesús Min is a 49 y.o. male with a past medical history of CAD (s/p stent placement in ) and tobacco abuse who presented to  Should I take this medication?              Time spent on discharge is 20 mins in patient examination, evaluation, counseling as well as medication reconciliation, prescriptions for required medications, discharge plan and follow up.    Electronically signed by   Donn Parks MD  6/17/2024  4:12 PM      Thank you Dr. Irwin primary care provider on file. for the opportunity to be involved in this patient's care.

## 2024-06-17 NOTE — BRIEF OP NOTE
Brief Postoperative Note      Patient: Jesús Min  YOB: 1975  MRN: [id number]    Date of Procedure: 6/17/2024    Pre-Op Diagnosis Codes:     * NSTEMI (non-ST elevated myocardial infarction) (HCC) [I21.4]    Post-Op Diagnosis: Same       Procedure(s):  Left heart cath / coronary angiography    Surgeon(s):  Makenzie Benavidez MD    Assistant:  * No surgical staff found *    Anesthesia: IV Sedation    Estimated Blood Loss (mL): Minimal    Complications: None       Findings: Severe Multivessel coronary artery disease. Severely impaired LVEF. Moderately elevated LVEDP.     Plan:   Resume heparin drip in two hours  Continue IV lasix  ASA, statin and BB  CTS consult for CABG  Patient will need transfer to [facility]   He is currently chest pain free and hemodynamically stable     Electronically signed by Makenzie Benavidez MD on 6/17/2024 at 4:03 PM

## 2024-06-18 ENCOUNTER — HOSPITAL ENCOUNTER (INPATIENT)
Age: 49
LOS: 13 days | Discharge: HOME | End: 2024-07-01
Attending: STUDENT IN AN ORGANIZED HEALTH CARE EDUCATION/TRAINING PROGRAM | Admitting: THORACIC SURGERY (CARDIOTHORACIC VASCULAR SURGERY)
Payer: COMMERCIAL

## 2024-06-18 VITALS
RESPIRATION RATE: 16 BRPM | TEMPERATURE: 98.6 F | WEIGHT: 253.53 LBS | HEART RATE: 80 BPM | BODY MASS INDEX: 40.75 KG/M2 | SYSTOLIC BLOOD PRESSURE: 104 MMHG | HEIGHT: 66 IN | DIASTOLIC BLOOD PRESSURE: 75 MMHG | OXYGEN SATURATION: 92 %

## 2024-06-18 DIAGNOSIS — I50.23 ACUTE ON CHRONIC SYSTOLIC (CONGESTIVE) HEART FAILURE (HCC): ICD-10-CM

## 2024-06-18 DIAGNOSIS — I25.10 CAD, MULTIPLE VESSEL: Primary | ICD-10-CM

## 2024-06-18 DIAGNOSIS — Z95.1 S/P CABG X 3: ICD-10-CM

## 2024-06-18 PROBLEM — I24.9 ACUTE CORONARY SYNDROME (HCC): Status: ACTIVE | Noted: 2024-06-18

## 2024-06-18 LAB
ANION GAP SERPL CALCULATED.3IONS-SCNC: 10 MMOL/L (ref 9–17)
ANTI-XA UNFRAC HEPARIN: 0.56 IU/L
ANTI-XA UNFRAC HEPARIN: 0.83 IU/L
BASOPHILS # BLD: 0.1 K/UL (ref 0–0.2)
BASOPHILS NFR BLD: 1 % (ref 0–2)
BUN SERPL-MCNC: 16 MG/DL (ref 6–20)
CALCIUM SERPL-MCNC: 9.4 MG/DL (ref 8.6–10.4)
CHLORIDE SERPL-SCNC: 104 MMOL/L (ref 98–107)
CO2 SERPL-SCNC: 28 MMOL/L (ref 20–31)
CREAT SERPL-MCNC: 1 MG/DL (ref 0.7–1.2)
ECHO BSA: 2.32 M2
EKG ATRIAL RATE: 108 BPM
EKG P AXIS: 60 DEGREES
EKG P-R INTERVAL: 158 MS
EKG Q-T INTERVAL: 376 MS
EKG QRS DURATION: 144 MS
EKG QTC CALCULATION (BAZETT): 503 MS
EKG R AXIS: 33 DEGREES
EKG T AXIS: -169 DEGREES
EKG VENTRICULAR RATE: 108 BPM
EOSINOPHIL # BLD: 0.2 K/UL (ref 0–0.4)
EOSINOPHILS RELATIVE PERCENT: 2 % (ref 1–4)
ERYTHROCYTE [DISTWIDTH] IN BLOOD BY AUTOMATED COUNT: 15.9 % (ref 11.8–14.4)
ERYTHROCYTE [DISTWIDTH] IN BLOOD BY AUTOMATED COUNT: 16.2 % (ref 12.5–15.4)
GFR, ESTIMATED: >90 ML/MIN/1.73M2
GLUCOSE SERPL-MCNC: 94 MG/DL (ref 70–99)
HCT VFR BLD AUTO: 39.7 % (ref 41–53)
HCT VFR BLD AUTO: 47.6 % (ref 40.7–50.3)
HGB BLD-MCNC: 13.5 G/DL (ref 13.5–17.5)
HGB BLD-MCNC: 13.5 G/DL (ref 13–17)
INR PPP: 1.1
LYMPHOCYTES NFR BLD: 2.6 K/UL (ref 1–4.8)
LYMPHOCYTES RELATIVE PERCENT: 24 % (ref 24–44)
MCH RBC QN AUTO: 27.9 PG (ref 25.2–33.5)
MCH RBC QN AUTO: 29.1 PG (ref 26–34)
MCHC RBC AUTO-ENTMCNC: 28.4 G/DL (ref 28.4–34.8)
MCHC RBC AUTO-ENTMCNC: 34.1 G/DL (ref 31–37)
MCV RBC AUTO: 85.3 FL (ref 80–100)
MCV RBC AUTO: 98.3 FL (ref 82.6–102.9)
MONOCYTES NFR BLD: 0.7 K/UL (ref 0.1–1.2)
MONOCYTES NFR BLD: 7 % (ref 2–11)
NEUTROPHILS NFR BLD: 66 % (ref 36–66)
NEUTS SEG NFR BLD: 7.2 K/UL (ref 1.8–7.7)
NRBC BLD-RTO: 0 PER 100 WBC
PARTIAL THROMBOPLASTIN TIME: 42.9 SEC (ref 21.3–31.3)
PARTIAL THROMBOPLASTIN TIME: 61.7 SEC (ref 21.3–31.3)
PLATELET # BLD AUTO: 274 K/UL (ref 140–450)
PLATELET # BLD AUTO: 277 K/UL (ref 138–453)
PMV BLD AUTO: 7.6 FL (ref 6–12)
PMV BLD AUTO: 9.8 FL (ref 8.1–13.5)
POTASSIUM SERPL-SCNC: 4.5 MMOL/L (ref 3.7–5.3)
PROTHROMBIN TIME: 14 SEC (ref 11.7–14.9)
RBC # BLD AUTO: 4.65 M/UL (ref 4.5–5.9)
RBC # BLD AUTO: 4.84 M/UL (ref 4.21–5.77)
SODIUM SERPL-SCNC: 142 MMOL/L (ref 135–144)
WBC OTHER # BLD: 10.4 K/UL (ref 3.5–11.3)
WBC OTHER # BLD: 10.8 K/UL (ref 3.5–11)

## 2024-06-18 PROCEDURE — 6370000000 HC RX 637 (ALT 250 FOR IP): Performed by: HOSPITALIST

## 2024-06-18 PROCEDURE — 6360000002 HC RX W HCPCS: Performed by: STUDENT IN AN ORGANIZED HEALTH CARE EDUCATION/TRAINING PROGRAM

## 2024-06-18 PROCEDURE — 85730 THROMBOPLASTIN TIME PARTIAL: CPT

## 2024-06-18 PROCEDURE — 6370000000 HC RX 637 (ALT 250 FOR IP): Performed by: INTERNAL MEDICINE

## 2024-06-18 PROCEDURE — 85610 PROTHROMBIN TIME: CPT

## 2024-06-18 PROCEDURE — 99233 SBSQ HOSP IP/OBS HIGH 50: CPT | Performed by: INTERNAL MEDICINE

## 2024-06-18 PROCEDURE — 36415 COLL VENOUS BLD VENIPUNCTURE: CPT

## 2024-06-18 PROCEDURE — 6370000000 HC RX 637 (ALT 250 FOR IP): Performed by: STUDENT IN AN ORGANIZED HEALTH CARE EDUCATION/TRAINING PROGRAM

## 2024-06-18 PROCEDURE — 99222 1ST HOSP IP/OBS MODERATE 55: CPT | Performed by: HOSPITALIST

## 2024-06-18 PROCEDURE — 2060000000 HC ICU INTERMEDIATE R&B

## 2024-06-18 PROCEDURE — 85027 COMPLETE CBC AUTOMATED: CPT

## 2024-06-18 PROCEDURE — 6360000002 HC RX W HCPCS: Performed by: HOSPITALIST

## 2024-06-18 PROCEDURE — 85025 COMPLETE CBC W/AUTO DIFF WBC: CPT

## 2024-06-18 PROCEDURE — 80048 BASIC METABOLIC PNL TOTAL CA: CPT

## 2024-06-18 PROCEDURE — 85520 HEPARIN ASSAY: CPT

## 2024-06-18 RX ORDER — IPRATROPIUM BROMIDE AND ALBUTEROL SULFATE 2.5; .5 MG/3ML; MG/3ML
1 SOLUTION RESPIRATORY (INHALATION) EVERY 4 HOURS PRN
Status: DISCONTINUED | OUTPATIENT
Start: 2024-06-18 | End: 2024-06-24

## 2024-06-18 RX ORDER — HEPARIN SODIUM 1000 [USP'U]/ML
4000 INJECTION, SOLUTION INTRAVENOUS; SUBCUTANEOUS PRN
Status: DISCONTINUED | OUTPATIENT
Start: 2024-06-18 | End: 2024-06-20 | Stop reason: SDUPTHER

## 2024-06-18 RX ORDER — HEPARIN SODIUM 1000 [USP'U]/ML
4000 INJECTION, SOLUTION INTRAVENOUS; SUBCUTANEOUS ONCE
Status: DISCONTINUED | OUTPATIENT
Start: 2024-06-18 | End: 2024-06-18 | Stop reason: ALTCHOICE

## 2024-06-18 RX ORDER — HYDROXYZINE HYDROCHLORIDE 25 MG/1
25 TABLET, FILM COATED ORAL 3 TIMES DAILY PRN
Status: DISCONTINUED | OUTPATIENT
Start: 2024-06-18 | End: 2024-06-24

## 2024-06-18 RX ORDER — POTASSIUM CHLORIDE 7.45 MG/ML
10 INJECTION INTRAVENOUS PRN
Status: DISCONTINUED | OUTPATIENT
Start: 2024-06-18 | End: 2024-06-19 | Stop reason: SDUPTHER

## 2024-06-18 RX ORDER — ONDANSETRON 4 MG/1
4 TABLET, ORALLY DISINTEGRATING ORAL EVERY 8 HOURS PRN
Status: DISCONTINUED | OUTPATIENT
Start: 2024-06-18 | End: 2024-06-24

## 2024-06-18 RX ORDER — HEPARIN SODIUM 1000 [USP'U]/ML
2000 INJECTION, SOLUTION INTRAVENOUS; SUBCUTANEOUS PRN
Status: DISCONTINUED | OUTPATIENT
Start: 2024-06-18 | End: 2024-06-24

## 2024-06-18 RX ORDER — HEPARIN SODIUM 10000 [USP'U]/100ML
5-30 INJECTION, SOLUTION INTRAVENOUS CONTINUOUS
Status: DISPENSED | OUTPATIENT
Start: 2024-06-18 | End: 2024-06-23

## 2024-06-18 RX ORDER — FUROSEMIDE 40 MG/1
40 TABLET ORAL DAILY
Status: DISCONTINUED | OUTPATIENT
Start: 2024-06-18 | End: 2024-06-19

## 2024-06-18 RX ORDER — POLYETHYLENE GLYCOL 3350 17 G/17G
17 POWDER, FOR SOLUTION ORAL DAILY PRN
Status: DISCONTINUED | OUTPATIENT
Start: 2024-06-18 | End: 2024-06-24

## 2024-06-18 RX ORDER — HEPARIN SODIUM 1000 [USP'U]/ML
2000 INJECTION, SOLUTION INTRAVENOUS; SUBCUTANEOUS PRN
Status: DISCONTINUED | OUTPATIENT
Start: 2024-06-18 | End: 2024-06-20 | Stop reason: SDUPTHER

## 2024-06-18 RX ORDER — ACETAMINOPHEN 650 MG/1
650 SUPPOSITORY RECTAL EVERY 6 HOURS PRN
Status: DISCONTINUED | OUTPATIENT
Start: 2024-06-18 | End: 2024-06-24

## 2024-06-18 RX ORDER — ASPIRIN 81 MG/1
81 TABLET, CHEWABLE ORAL DAILY
Status: DISCONTINUED | OUTPATIENT
Start: 2024-06-18 | End: 2024-06-19

## 2024-06-18 RX ORDER — SODIUM CHLORIDE 0.9 % (FLUSH) 0.9 %
10 SYRINGE (ML) INJECTION PRN
Status: DISCONTINUED | OUTPATIENT
Start: 2024-06-18 | End: 2024-06-24

## 2024-06-18 RX ORDER — FUROSEMIDE 10 MG/ML
20 INJECTION INTRAMUSCULAR; INTRAVENOUS 2 TIMES DAILY
Status: DISCONTINUED | OUTPATIENT
Start: 2024-06-18 | End: 2024-06-18 | Stop reason: HOSPADM

## 2024-06-18 RX ORDER — SODIUM CHLORIDE 0.9 % (FLUSH) 0.9 %
5-40 SYRINGE (ML) INJECTION PRN
Status: DISCONTINUED | OUTPATIENT
Start: 2024-06-18 | End: 2024-06-24

## 2024-06-18 RX ORDER — POTASSIUM CHLORIDE 20 MEQ/1
40 TABLET, EXTENDED RELEASE ORAL PRN
Status: DISCONTINUED | OUTPATIENT
Start: 2024-06-18 | End: 2024-06-19 | Stop reason: SDUPTHER

## 2024-06-18 RX ORDER — MAGNESIUM SULFATE IN WATER 40 MG/ML
2000 INJECTION, SOLUTION INTRAVENOUS PRN
Status: DISCONTINUED | OUTPATIENT
Start: 2024-06-18 | End: 2024-06-24

## 2024-06-18 RX ORDER — ATORVASTATIN CALCIUM 80 MG/1
80 TABLET, FILM COATED ORAL NIGHTLY
Status: DISCONTINUED | OUTPATIENT
Start: 2024-06-18 | End: 2024-06-19

## 2024-06-18 RX ORDER — ATORVASTATIN CALCIUM 80 MG/1
80 TABLET, FILM COATED ORAL NIGHTLY
Status: DISCONTINUED | OUTPATIENT
Start: 2024-06-18 | End: 2024-06-24

## 2024-06-18 RX ORDER — SODIUM CHLORIDE 9 MG/ML
INJECTION, SOLUTION INTRAVENOUS PRN
Status: DISCONTINUED | OUTPATIENT
Start: 2024-06-18 | End: 2024-06-24

## 2024-06-18 RX ORDER — HEPARIN SODIUM 1000 [USP'U]/ML
4000 INJECTION, SOLUTION INTRAVENOUS; SUBCUTANEOUS PRN
Status: DISCONTINUED | OUTPATIENT
Start: 2024-06-18 | End: 2024-06-24

## 2024-06-18 RX ORDER — METOPROLOL SUCCINATE 25 MG/1
12.5 TABLET, EXTENDED RELEASE ORAL DAILY
Status: DISCONTINUED | OUTPATIENT
Start: 2024-06-18 | End: 2024-06-18 | Stop reason: HOSPADM

## 2024-06-18 RX ORDER — ALBUTEROL SULFATE 90 UG/1
2 AEROSOL, METERED RESPIRATORY (INHALATION) EVERY 4 HOURS PRN
Status: DISCONTINUED | OUTPATIENT
Start: 2024-06-18 | End: 2024-06-24

## 2024-06-18 RX ORDER — POTASSIUM CHLORIDE 20 MEQ/1
10 TABLET, EXTENDED RELEASE ORAL DAILY
Status: DISCONTINUED | OUTPATIENT
Start: 2024-06-18 | End: 2024-06-24

## 2024-06-18 RX ORDER — ACETAMINOPHEN 325 MG/1
650 TABLET ORAL EVERY 6 HOURS PRN
Status: DISCONTINUED | OUTPATIENT
Start: 2024-06-18 | End: 2024-06-24

## 2024-06-18 RX ORDER — HEPARIN SODIUM 10000 [USP'U]/100ML
5-30 INJECTION, SOLUTION INTRAVENOUS CONTINUOUS
Status: DISCONTINUED | OUTPATIENT
Start: 2024-06-18 | End: 2024-06-20 | Stop reason: SDUPTHER

## 2024-06-18 RX ORDER — SODIUM CHLORIDE 0.9 % (FLUSH) 0.9 %
5-40 SYRINGE (ML) INJECTION EVERY 12 HOURS SCHEDULED
Status: DISCONTINUED | OUTPATIENT
Start: 2024-06-18 | End: 2024-06-24

## 2024-06-18 RX ORDER — ONDANSETRON 2 MG/ML
4 INJECTION INTRAMUSCULAR; INTRAVENOUS EVERY 6 HOURS PRN
Status: DISCONTINUED | OUTPATIENT
Start: 2024-06-18 | End: 2024-06-24

## 2024-06-18 RX ORDER — ASPIRIN 81 MG/1
81 TABLET, CHEWABLE ORAL DAILY
Status: DISCONTINUED | OUTPATIENT
Start: 2024-06-18 | End: 2024-06-24

## 2024-06-18 RX ADMIN — POTASSIUM CHLORIDE 10 MEQ: 1500 TABLET, EXTENDED RELEASE ORAL at 18:47

## 2024-06-18 RX ADMIN — FUROSEMIDE 40 MG: 40 TABLET ORAL at 18:47

## 2024-06-18 RX ADMIN — HEPARIN SODIUM 22 UNITS/KG/HR: 10000 INJECTION, SOLUTION INTRAVENOUS at 18:42

## 2024-06-18 RX ADMIN — METOPROLOL SUCCINATE 12.5 MG: 25 TABLET, EXTENDED RELEASE ORAL at 13:09

## 2024-06-18 RX ADMIN — HEPARIN SODIUM 2000 UNITS: 1000 INJECTION INTRAVENOUS; SUBCUTANEOUS at 02:08

## 2024-06-18 RX ADMIN — HEPARIN SODIUM 22 UNITS/KG/HR: 10000 INJECTION, SOLUTION INTRAVENOUS at 06:56

## 2024-06-18 RX ADMIN — ASPIRIN 81 MG: 81 TABLET, CHEWABLE ORAL at 09:15

## 2024-06-18 RX ADMIN — HYDROXYZINE HYDROCHLORIDE 25 MG: 25 TABLET ORAL at 23:42

## 2024-06-18 RX ADMIN — ASPIRIN 81 MG 81 MG: 81 TABLET ORAL at 18:46

## 2024-06-18 RX ADMIN — ATORVASTATIN CALCIUM 80 MG: 80 TABLET, FILM COATED ORAL at 19:58

## 2024-06-18 RX ADMIN — FUROSEMIDE 40 MG: 10 INJECTION, SOLUTION INTRAMUSCULAR; INTRAVENOUS at 09:14

## 2024-06-18 ASSESSMENT — LIFESTYLE VARIABLES
HOW OFTEN DO YOU HAVE A DRINK CONTAINING ALCOHOL: MONTHLY OR LESS
HOW MANY STANDARD DRINKS CONTAINING ALCOHOL DO YOU HAVE ON A TYPICAL DAY: 1 OR 2

## 2024-06-18 NOTE — PLAN OF CARE
Problem: Discharge Planning  Goal: Discharge to home or other facility with appropriate resources  Outcome: Progressing     Problem: Safety - Adult  Goal: Free from fall injury  Outcome: Progressing     Problem: Respiratory - Adult  Goal: Achieves optimal ventilation and oxygenation  Outcome: Progressing  Flowsheets (Taken 6/17/2024 0808 by Ya Valentine RN)  Achieves optimal ventilation and oxygenation: Assess for changes in respiratory status     Problem: Cardiovascular - Adult  Goal: Maintains optimal cardiac output and hemodynamic stability  Outcome: Progressing  Flowsheets (Taken 6/17/2024 0808 by Ya Valentine RN)  Maintains optimal cardiac output and hemodynamic stability: Monitor blood pressure and heart rate  Goal: Absence of cardiac dysrhythmias or at baseline  Outcome: Progressing  Flowsheets (Taken 6/17/2024 0808 by Ya Valentine RN)  Absence of cardiac dysrhythmias or at baseline: Monitor cardiac rate and rhythm     Problem: Chronic Conditions and Co-morbidities  Goal: Patient's chronic conditions and co-morbidity symptoms are monitored and maintained or improved  Outcome: Progressing

## 2024-06-18 NOTE — CARE COORDINATION
Case Management Assessment  Initial Evaluation    Date/Time of Evaluation: 6/18/2024 4:50PM  Assessment Completed by: Katiuska Hicks RN    If patient is discharged prior to next notation, then this note serves as note for discharge by case management.    Patient Name: Jesús Min                   YOB: 1975  Diagnosis: Acute coronary syndrome (HCC) [I24.9]                   Date / Time: 6/18/2024  3:00 PM    Patient Admission Status: Inpatient   Readmission Risk (Low < 19, Mod (19-27), High > 27): Readmission Risk Score: 7.3    Current PCP: No primary care provider on file.  PCP verified by CM? (P) Yes (states goes to [facility])    Chart Reviewed: Yes      History Provided by: Patient  Patient Orientation: Alert and Oriented, Person, Place, Situation    Patient Cognition: Alert    Hospitalization in the last 30 days (Readmission):  No    If yes, Readmission Assessment in CM Navigator will be completed.    Advance Directives:      Code Status: Full Code   Patient's Primary Decision Maker is: (P) Legal Next of Kin      Discharge Planning:    Patient lives with: (P) Spouse/Significant Other, Children Type of Home: (P) Trailer/Mobile Home  Primary Care Giver: Self  Patient Support Systems include: Spouse/Significant Other, Children   Current Financial resources: (P) Medicaid  Current community resources: (P) None  Current services prior to admission: (P) None            Current DME:              Type of Home Care services:  (P) None    ADLS  Prior functional level: (P) Independent in ADLs/IADLs  Current functional level: (P) Independent in ADLs/IADLs    PT AM-PAC:   /24  OT AM-PAC:   /24    Family can provide assistance at DC: (P) Yes  Would you like Case Management to discuss the discharge plan with any other family members/significant others, and if so, who? (P) Yes (wife Ginna)  Plans to Return to Present Housing: (P) Yes  Other Identified Issues/Barriers to RETURNING to current

## 2024-06-18 NOTE — CARE COORDINATION
[facility] Quality Flow/Interdisciplinary Rounds Progress Note    Quality Flow Rounds held on June 18, 2024 at 0930    Disciplines Attending:  Bedside Nurse, , , Nursing Unit Leadership, Dietary, Physical Therapy, and Spiritual Care/    Barriers to Discharge: Bed availability    Anticipated Discharge Date:   6/18/24    Anticipated Discharge Disposition: [facility]    Readmission Risk              Risk of Unplanned Readmission:  11           Discussed patient goal for the day, patient clinical progression, and barriers to discharge. Await bed availability at [facility].  Irasema Summers RN  June 18, 2024

## 2024-06-18 NOTE — H&P
[facility]  Office: [phone]  Francis Wynne DO, Barry Devries DO, Wei Downing DO, Khris Scott DO, David Evans MD, Kenia Gillis MD, Cecil Hampton MD, Jessy Franco MD,  Leroy Hill MD, Florecita Moran MD, Dylan Yeager MD,  Rio Collazo DO, Taisha Andrew MD, Donn Parks MD, Paul Wynne DO, Olimpia Hernandez MD,  Zia Dowd DO, Bindu Danielson MD, Any Miller MD, Sherly Angeles MD, Cl Alexander MD,  Jorge Luis Bahena MD, Robin Wilson MD, Bubba Candelario MD, Dahlia Stapleton MD, Elder Rios MD, Dinesh Ledezma MD, Raj Jenkins DO, Ranjan Rashid DO, Aubrey Rodriguez MD,  Edu Vazquez MD, Shirley Waterhouse, CNP,  Chelle Perdomo CNP, Ricardo Webb, CNP,  Ellen Jacinto, DNP, Bess Tao, CNP, Saira Taveras, CNP, Francine June CNP, Kati Toussaint, CNP, Sidra García, PA-C, Farrah Jose PA-C, Ronel Hudson, CNP, Kendal Flor, CNP, Prakash Gardiner, CNP, Mayela Sweeney, CNP, Darlyn Izaguirre, CNP, Shauna Gibbons, CNS, Priyanka Maldonado, CNP, Jacquelin Bucio CNP, Tracy Schwab, CNP         [facility]   IN-PATIENT SERVICE   [facility]    HISTORY AND PHYSICAL EXAMINATION            Date:   6/18/2024  Patient name:  Jesús Min  Date of admission:  6/18/2024  3:00 PM  MRN:   [id number]  Account:  [id number]  YOB: 1975  PCP:    No primary care provider on file.  Room:   [address] [place] [address]  Code Status:    Full Code      History Obtained From:     patient, electronic medical record    History of Present Illness:     Jesús Min is a 49 y.o. Non- / non  male who presents with new onset cardiomyopathy and is admitted to the hospital for the management of Acute coronary syndrome (HCC).    This very pleasant 49-year-old male presented to the hospital with lower extremity swelling and shortness of breath.  He has not followed with a primary care physician for quite some time.  His past medical history significant for  (36.7 °C) (Oral)   Resp 22   SpO2 97%   Temp (24hrs), Av.4 °F (36.9 °C), Min:97.2 °F (36.2 °C), Max:99.3 °F (37.4 °C)    No results for input(s): \"POCGLU\" in the last 72 hours.  No intake or output data in the 24 hours ending 24 1540    General Appearance:  alert, well appearing, and in no acute distress  Mental status: oriented to person, place, and time  Head:  normocephalic, atraumatic  Eye: no icterus, redness, pupils equal and reactive, extraocular eye movements intact, conjunctiva clear  Ear: normal external ear, no discharge, hearing intact  Nose:  no drainage noted  Mouth: mucous membranes moist  Neck: supple, no carotid bruits, thyroid not palpable  Lungs: Bilateral equal air entry, clear to ausculation, no wheezing, rales or rhonchi, normal effort  Cardiovascular: normal rate, regular rhythm, no murmur, gallop, rub  Abdomen: Soft, nontender, nondistended, normal bowel sounds, no hepatomegaly or splenomegaly  Neurologic: There are no new focal motor or sensory deficits, normal muscle tone and bulk, no abnormal sensation, normal speech, cranial nerves II through XII grossly intact  Skin: No gross lesions, rashes, bruising or bleeding on exposed skin area  Extremities:  peripheral pulses palpable, no calf pain with palpation.  Trace edema bilaterally  Psych: normal affect     Investigations:      Laboratory Testing:  Recent Results (from the past 24 hour(s))   Cardiac procedure    Collection Time: 24  4:04 PM   Result Value Ref Range    Body Surface Area 2.32 m2   Basic Metabolic Panel w/ Reflex to MG    Collection Time: 24  1:41 AM   Result Value Ref Range    Sodium 142 135 - 144 mmol/L    Potassium 4.5 3.7 - 5.3 mmol/L    Chloride 104 98 - 107 mmol/L    CO2 28 20 - 31 mmol/L    Anion Gap 10 9 - 17 mmol/L    Glucose 94 70 - 99 mg/dL    BUN 16 6 - 20 mg/dL    Creatinine 1.0 0.7 - 1.2 mg/dL    Est, Glom Filt Rate >90 >60 mL/min/1.73m2    Calcium 9.4 8.6 - 10.4 mg/dL   CBC with Auto

## 2024-06-18 NOTE — PLAN OF CARE
Problem: Discharge Planning  Goal: Discharge to home or other facility with appropriate resources  Outcome: Progressing  Flowsheets (Taken 6/18/2024 0800)  Discharge to home or other facility with appropriate resources:   Identify barriers to discharge with patient and caregiver   Identify discharge learning needs (meds, wound care, etc)     Problem: Safety - Adult  Goal: Free from fall injury  Outcome: Progressing     Problem: Respiratory - Adult  Goal: Achieves optimal ventilation and oxygenation  Outcome: Progressing  Flowsheets (Taken 6/18/2024 0800)  Achieves optimal ventilation and oxygenation:   Assess for changes in respiratory status   Position to facilitate oxygenation and minimize respiratory effort   Oxygen supplementation based on oxygen saturation or arterial blood gases   Initiate smoking cessation protocol as indicated     Problem: Cardiovascular - Adult  Goal: Maintains optimal cardiac output and hemodynamic stability  Outcome: Progressing  Flowsheets (Taken 6/18/2024 0800)  Maintains optimal cardiac output and hemodynamic stability:   Monitor blood pressure and heart rate   Monitor urine output and notify Licensed Independent Practitioner for values outside of normal range   Assess for signs of decreased cardiac output  Goal: Absence of cardiac dysrhythmias or at baseline  Outcome: Progressing  Flowsheets (Taken 6/18/2024 0800)  Absence of cardiac dysrhythmias or at baseline: Monitor cardiac rate and rhythm     Problem: Chronic Conditions and Co-morbidities  Goal: Patient's chronic conditions and co-morbidity symptoms are monitored and maintained or improved  Outcome: Progressing

## 2024-06-19 ENCOUNTER — TELEPHONE (OUTPATIENT)
Dept: CARDIOTHORACIC SURGERY | Age: 49
End: 2024-06-19

## 2024-06-19 ENCOUNTER — APPOINTMENT (OUTPATIENT)
Dept: ULTRASOUND IMAGING | Age: 49
End: 2024-06-19
Attending: STUDENT IN AN ORGANIZED HEALTH CARE EDUCATION/TRAINING PROGRAM
Payer: COMMERCIAL

## 2024-06-19 PROBLEM — I34.0 MILD MITRAL REGURGITATION: Status: ACTIVE | Noted: 2024-06-19

## 2024-06-19 PROBLEM — Z72.0 TOBACCO ABUSE: Status: ACTIVE | Noted: 2024-06-19

## 2024-06-19 PROBLEM — E78.5 DYSLIPIDEMIA: Status: ACTIVE | Noted: 2024-06-19

## 2024-06-19 PROBLEM — E66.813 CLASS 3 SEVERE OBESITY DUE TO EXCESS CALORIES WITH SERIOUS COMORBIDITY AND BODY MASS INDEX (BMI) OF 40.0 TO 44.9 IN ADULT: Status: ACTIVE | Noted: 2024-06-19

## 2024-06-19 PROBLEM — I25.10 CAD, MULTIPLE VESSEL: Status: ACTIVE | Noted: 2024-06-19

## 2024-06-19 PROBLEM — I07.1 MILD TRICUSPID REGURGITATION: Status: ACTIVE | Noted: 2024-06-19

## 2024-06-19 PROBLEM — E66.01 CLASS 3 SEVERE OBESITY DUE TO EXCESS CALORIES WITH SERIOUS COMORBIDITY AND BODY MASS INDEX (BMI) OF 40.0 TO 44.9 IN ADULT (HCC): Status: ACTIVE | Noted: 2024-06-19

## 2024-06-19 PROBLEM — I50.23 ACUTE ON CHRONIC SYSTOLIC (CONGESTIVE) HEART FAILURE (HCC): Status: ACTIVE | Noted: 2024-06-19

## 2024-06-19 PROBLEM — J90 MODERATE SIZED PLEURAL EFFUSION: Status: ACTIVE | Noted: 2024-06-19

## 2024-06-19 LAB
ANION GAP SERPL CALCULATED.3IONS-SCNC: 10 MMOL/L (ref 9–16)
ANTI-XA UNFRAC HEPARIN: 0.81 IU/L
ANTI-XA UNFRAC HEPARIN: <0.1 IU/L
BASOPHILS # BLD: 0.07 K/UL (ref 0–0.2)
BASOPHILS NFR BLD: 1 % (ref 0–2)
BUN SERPL-MCNC: 13 MG/DL (ref 6–20)
CALCIUM SERPL-MCNC: 9 MG/DL (ref 8.6–10.4)
CHLORIDE SERPL-SCNC: 104 MMOL/L (ref 98–107)
CO2 SERPL-SCNC: 25 MMOL/L (ref 20–31)
CREAT SERPL-MCNC: 1 MG/DL (ref 0.7–1.2)
EOSINOPHIL # BLD: 0.21 K/UL (ref 0–0.44)
EOSINOPHILS RELATIVE PERCENT: 2 % (ref 1–4)
ERYTHROCYTE [DISTWIDTH] IN BLOOD BY AUTOMATED COUNT: 15.4 % (ref 11.8–14.4)
GFR, ESTIMATED: >90 ML/MIN/1.73M2
GLUCOSE SERPL-MCNC: 104 MG/DL (ref 74–99)
HCT VFR BLD AUTO: 44 % (ref 40.7–50.3)
HGB BLD-MCNC: 13.6 G/DL (ref 13–17)
IMM GRANULOCYTES # BLD AUTO: 0.09 K/UL (ref 0–0.3)
IMM GRANULOCYTES NFR BLD: 1 %
LYMPHOCYTES NFR BLD: 2.12 K/UL (ref 1.1–3.7)
LYMPHOCYTES RELATIVE PERCENT: 21 % (ref 24–43)
MCH RBC QN AUTO: 27.6 PG (ref 25.2–33.5)
MCHC RBC AUTO-ENTMCNC: 30.9 G/DL (ref 28.4–34.8)
MCV RBC AUTO: 89.2 FL (ref 82.6–102.9)
MONOCYTES NFR BLD: 0.58 K/UL (ref 0.1–1.2)
MONOCYTES NFR BLD: 6 % (ref 3–12)
NEUTROPHILS NFR BLD: 70 % (ref 36–65)
NEUTS SEG NFR BLD: 7.13 K/UL (ref 1.5–8.1)
NRBC BLD-RTO: 0 PER 100 WBC
PLATELET # BLD AUTO: 263 K/UL (ref 138–453)
PMV BLD AUTO: 9.6 FL (ref 8.1–13.5)
POTASSIUM SERPL-SCNC: 4 MMOL/L (ref 3.7–5.3)
RBC # BLD AUTO: 4.93 M/UL (ref 4.21–5.77)
RBC # BLD: ABNORMAL 10*6/UL
SODIUM SERPL-SCNC: 139 MMOL/L (ref 136–145)
WBC OTHER # BLD: 10.2 K/UL (ref 3.5–11.3)

## 2024-06-19 PROCEDURE — 6360000002 HC RX W HCPCS: Performed by: HOSPITALIST

## 2024-06-19 PROCEDURE — 36415 COLL VENOUS BLD VENIPUNCTURE: CPT

## 2024-06-19 PROCEDURE — 2060000000 HC ICU INTERMEDIATE R&B

## 2024-06-19 PROCEDURE — 2580000003 HC RX 258: Performed by: STUDENT IN AN ORGANIZED HEALTH CARE EDUCATION/TRAINING PROGRAM

## 2024-06-19 PROCEDURE — 32555 ASPIRATE PLEURA W/ IMAGING: CPT

## 2024-06-19 PROCEDURE — 99232 SBSQ HOSP IP/OBS MODERATE 35: CPT | Performed by: INTERNAL MEDICINE

## 2024-06-19 PROCEDURE — 6370000000 HC RX 637 (ALT 250 FOR IP): Performed by: STUDENT IN AN ORGANIZED HEALTH CARE EDUCATION/TRAINING PROGRAM

## 2024-06-19 PROCEDURE — 0W993ZZ DRAINAGE OF RIGHT PLEURAL CAVITY, PERCUTANEOUS APPROACH: ICD-10-PCS | Performed by: RADIOLOGY

## 2024-06-19 PROCEDURE — 85025 COMPLETE CBC W/AUTO DIFF WBC: CPT

## 2024-06-19 PROCEDURE — 99233 SBSQ HOSP IP/OBS HIGH 50: CPT | Performed by: INTERNAL MEDICINE

## 2024-06-19 PROCEDURE — 99222 1ST HOSP IP/OBS MODERATE 55: CPT | Performed by: NURSE PRACTITIONER

## 2024-06-19 PROCEDURE — 6370000000 HC RX 637 (ALT 250 FOR IP): Performed by: INTERNAL MEDICINE

## 2024-06-19 PROCEDURE — 97535 SELF CARE MNGMENT TRAINING: CPT

## 2024-06-19 PROCEDURE — 85520 HEPARIN ASSAY: CPT

## 2024-06-19 PROCEDURE — 97165 OT EVAL LOW COMPLEX 30 MIN: CPT

## 2024-06-19 PROCEDURE — 80048 BASIC METABOLIC PNL TOTAL CA: CPT

## 2024-06-19 PROCEDURE — 6370000000 HC RX 637 (ALT 250 FOR IP): Performed by: HOSPITALIST

## 2024-06-19 PROCEDURE — 6360000002 HC RX W HCPCS: Performed by: NURSE PRACTITIONER

## 2024-06-19 RX ORDER — FUROSEMIDE 10 MG/ML
20 INJECTION INTRAMUSCULAR; INTRAVENOUS 2 TIMES DAILY
Status: DISCONTINUED | OUTPATIENT
Start: 2024-06-19 | End: 2024-06-20

## 2024-06-19 RX ORDER — FUROSEMIDE 10 MG/ML
40 INJECTION INTRAMUSCULAR; INTRAVENOUS 2 TIMES DAILY
Status: DISCONTINUED | OUTPATIENT
Start: 2024-06-19 | End: 2024-06-19

## 2024-06-19 RX ORDER — POTASSIUM CHLORIDE 20 MEQ/1
40 TABLET, EXTENDED RELEASE ORAL PRN
Status: DISCONTINUED | OUTPATIENT
Start: 2024-06-19 | End: 2024-06-24

## 2024-06-19 RX ORDER — POTASSIUM CHLORIDE 7.45 MG/ML
10 INJECTION INTRAVENOUS PRN
Status: DISCONTINUED | OUTPATIENT
Start: 2024-06-19 | End: 2024-06-24

## 2024-06-19 RX ADMIN — ATORVASTATIN CALCIUM 80 MG: 80 TABLET, FILM COATED ORAL at 20:48

## 2024-06-19 RX ADMIN — HEPARIN SODIUM 19 UNITS/KG/HR: 10000 INJECTION, SOLUTION INTRAVENOUS at 20:41

## 2024-06-19 RX ADMIN — FUROSEMIDE 20 MG: 10 INJECTION, SOLUTION INTRAMUSCULAR; INTRAVENOUS at 17:33

## 2024-06-19 RX ADMIN — HEPARIN SODIUM 20 UNITS/KG/HR: 10000 INJECTION, SOLUTION INTRAVENOUS at 06:25

## 2024-06-19 RX ADMIN — ASPIRIN 81 MG 81 MG: 81 TABLET ORAL at 08:07

## 2024-06-19 RX ADMIN — POTASSIUM CHLORIDE 10 MEQ: 1500 TABLET, EXTENDED RELEASE ORAL at 08:07

## 2024-06-19 RX ADMIN — SODIUM CHLORIDE, PRESERVATIVE FREE 10 ML: 5 INJECTION INTRAVENOUS at 20:49

## 2024-06-19 RX ADMIN — SODIUM CHLORIDE, PRESERVATIVE FREE 10 ML: 5 INJECTION INTRAVENOUS at 08:10

## 2024-06-19 RX ADMIN — HYDROXYZINE HYDROCHLORIDE 25 MG: 25 TABLET ORAL at 18:46

## 2024-06-19 RX ADMIN — METOPROLOL TARTRATE 12.5 MG: 25 TABLET, FILM COATED ORAL at 08:11

## 2024-06-19 RX ADMIN — HYDROXYZINE HYDROCHLORIDE 25 MG: 25 TABLET ORAL at 08:08

## 2024-06-19 RX ADMIN — FUROSEMIDE 40 MG: 40 TABLET ORAL at 08:07

## 2024-06-19 RX ADMIN — METOPROLOL TARTRATE 12.5 MG: 25 TABLET, FILM COATED ORAL at 20:48

## 2024-06-19 NOTE — CARE COORDINATION
Transitional planning    Spoke to patient about plan for discharge. Plan is home with wife. No needs

## 2024-06-19 NOTE — CONSULTS
[facility] Cardiothoracic Surgery  Consult    Patient's Name/Date of Birth: Jesús Min / 1975 (49 y.o.)    Date: June 19, 2024     Chief Complaint: MVCAD    HPI: Jesús Min is a 49 y.o.   male who presented to cardiothoracic surgery multivessel CAD.  Patient came to [place] emergency department with chest pain.  After further evaluation patient noted to have multivessel CAD and [facility].  Patient noted to have a low ejection fraction.  Beginning congestive heart failure management.  Anticipate repeat echocardiogram later this week.    She had presented to [facility] with CHF exacerbation.  Has not follow-up with primary care in a long time.    Patient is currently resting in bed with no chest pain or shortness of breath.  Alert and oriented x 4.  Educated him on the importance of a fluid restriction beginning now.  Will tentatively plan for bypass surgery Monday with Dr. James.        ROS:   CONSTITUTIONAL:  A&0x4  Respiratory: negative  Cardiovascular: negative  Gastrointestinal: negative  Genitourinary:negative  Hematologic/lymphatic: negative  Musculoskeletal:negative  Neurological: negative  Endocrine: negative  Psychiatric: negative  Past Medical History:   Diagnosis Date    CHF (congestive heart failure) (HCC)     Heart attack (HCC)      Past Surgical History:   Procedure Laterality Date    CARDIAC CATHETERIZATION      with stent placement    CARDIAC PROCEDURE N/A 6/17/2024    Left heart cath / coronary angiography performed by Makenzie Benavidez MD at [facility] Cardiac Cath/IR     Allergies   Allergen Reactions    Codeine Nausea Only    Pcn [Penicillins]      No family history on file.  Social History     Socioeconomic History    Marital status:      Spouse name: Ginna Min    Number of children: Not on file    Years of education: Not on file    Highest education level: Not on file   Occupational History  with 10-20% stenosis      Right Coronary Artery   The vessel is large. Size of vessel >=2.0 mm. There is severe disease. Ostial chronic total occlusion with left to right collaterals.          Echo:  Left Ventricle: Severely reduced left ventricular systolic function with a visually estimated EF of 15 - 20%. Left ventricle is mildly dilated. Increased wall thickness. Findings consistent with mild eccentric hypertrophy. Severe global hypokinesis present, with minor regional variation. Grade III diastolic dysfunction with increased LAP.    Right Ventricle: Right ventricle size is normal. Moderately reduced systolic function.    Aortic Valve: Mild regurgitation.    Mitral Valve: Mildly thickened leaflet. Mild regurgitation.    Tricuspid Valve: Mild regurgitation. Mildly elevated RVSP, consistent with mild pulmonary hypertension. The estimated RVSP is 46 mmHg.    Pulmonic Valve: Mild regurgitation.    Image quality is adequate.    CT:1. No evidence of pulmonary embolism.  2. Moderate right pleural effusion and small left pleural effusion.  3. Mild bibasilar atelectasis, greater on the right  4. Cardiomegaly.  5. Multiple nonspecific lymph nodes seen in the middle mediastinum, probably  reactive.  Follow-up exam recommended      Assessment   Patient Active Problem List   Diagnosis    NSTEMI (non-ST elevated myocardial infarction) (HCC)    New onset of congestive heart failure (HCC)    CAD, multiple vessel    Mild mitral regurgitation    Mild tricuspid regurgitation    Dyslipidemia    Class 3 severe obesity due to excess calories with serious comorbidity and body mass index (BMI) of 40.0 to 44.9 in adult (HCC)    Tobacco abuse       Risks Reviewed w/pt  - Risk for stroke: Yes  - Risk for bleeding:Yes  - Risk for cardiac arrythmias: Yes  - Small risk of death: Yes  - Risks of pneumonia from ventilator: Yes  - Risk for infection: Yes    PLAN:  IR to evaluate for right thoracentesis  Aggressive HF management with eval echo

## 2024-06-19 NOTE — PROGRESS NOTES
Physical Therapy        Physical Therapy Cancel Note      DATE: 2024    NAME: Jesús Min  MRN: [id number]   : 1975      Patient not seen this date for Physical Therapy due to:    Other: pt currently independent, plan for CABG  per chart. PT will check back  for post-op needs.       Electronically signed by Malka Rodriguez PT on 2024 at 2:22 PM

## 2024-06-19 NOTE — BRIEF OP NOTE
Brief Postoperative Note for Thoracentesis    Jesús Min  YOB: 1975  [id number]    Pre-operative Diagnosis: right pleural effusion      Post-operative Diagnosis: Same    Procedure: Ultrasound guided thoracentesis     Anesthesia: 1% Lidocaine     Surgeons/Assistants: MD Feng    Complications: none    EBL: Minimal    Specimens: Were obtained    Ultrasound guided right thoracentesis performed. 500 ml kalpesh fluid obtained. Dressing applied.      Electronically signed by LAURIE Powers on 6/19/2024 at 3:01 PM

## 2024-06-19 NOTE — FLOWSHEET NOTE
Thoracentesis: via right chest    Dr. Toney  MS  RN  KV RDMS       Patient to US room 8, identified, allergies confirmed. Sitting up, monitors on. Stable.    Time out complete. Prep to RIGHT back   500 MLS OF JOSE RAUL COLORED  fluid drawn off. No orders for sample at this time. 2x2 with Tegaderm to back puncture. No problems noted.     Patient tolerated well. Report called to RN.   Patient stable, off monitor, and AWAITING TRANSPORT IN ir HOLDING AREA.

## 2024-06-19 NOTE — PROGRESS NOTES
[facility]  Office: [phone]  Francis Wynne DO, Barry Devries, DO, Wei Downing DO, Khris Scott, DO, David Evans MD, Kenia Gillis MD, Cecil Hampton MD, Jessy Franco MD,  Leroy Hill MD, Florecita Moran MD, Dylan Yeager MD,  Rio Collazo DO, Taisha Andrew MD, Donn Parks MD, Paul Wynne DO, Olimpia Hernandez MD,  Zia Dowd DO, Bindu Danielson MD, Any Miller MD, Sherly Angeles MD, Cl Alexander MD,  Jorge Luis Bahena MD, Robin Wilson MD, Bubba Candelario MD, Dahlia Stapleton MD, Elder Rios MD, Dinesh Ledezma MD, Raj Jenkins DO, Ranjan Rashid DO, Aubrey Rodriguez MD,  Edu Vazquez MD, Shirley Waterhouse, CNP,  Chelle Perdomo CNP, Ricardo Webb, CNP,  Ellen Jacinto, DNP, Bess Tao, CNP, Saira Taveras, CNP, Francine June CNP, Kati Toussaint CNP, Sidra García, PA-C, Farrah Jose PA-C, Ronel Hudson, CNP, Kendal Flor, CNP, Prakash Gardiner, CNP, Mayela Sweeney, CNP, Darlyn Izaguirre CNP, Shauna Gibbons, CNS, Priyanka Maldonado, CNP, Jacquelin Bucio CNP, Tracy Schwab, CNP         [facility]   IN-PATIENT SERVICE   [facility]    Progress Note    6/19/2024    3:41 PM    Name:   Jesús Min  MRN:     [id number]     Acct:      [id number]   Room:   0540/0540-01   Day:  1  Admit Date:  6/18/2024  3:00 PM    PCP:   No primary care provider on file.  Code Status:  Full Code    Subjective:     Patient doing well today.  No chest pain.  No shortness of breath.  Has no complaints.  He is hoping to have procedure done soon      Brief History:     This is a 49-year-old male who presented to the hospital as a transfer for evaluation for    Medications:     Allergies:    Allergies   Allergen Reactions    Codeine Nausea Only    Pcn [Penicillins]        Current Meds:   Scheduled Meds:    metoprolol tartrate  12.5 mg Oral BID    furosemide  20 mg IntraVENous BID    sodium chloride flush  5-40 mL IntraVENous 2 times per day    atorvastatin   L since admission  Mild mitral and tricuspid regurgitation  Dyslipidemia  Obesity BMI of 40  Tobacco abuse      Cardiothoracic surgery consult for CABG evaluation.  Continue aspirin, Lipitor and heparin gtt.  Start low-dose beta-blocker.  Can start Entresto after CABG (if candidate)  Okay to hold heparin drip prior to thoracentesis restart after  Continue oral Lasix 40 mg daily.  Add goal-directed medical therapy as tolerated.  Strict I's and O's.  2 g sodium restriction.  Appears euvolemic today  Continue statin for NSTEMI and dyslipidemia  Discussed smoking station with patient  Recommend weight loss lifestyle modification  PTOT    Medical Decision Making: Osei Collazo DO  6/19/2024  3:41 PM

## 2024-06-19 NOTE — TELEPHONE ENCOUNTER
----- Message from JOSE Tang NP sent at 6/19/2024 12:35 PM EDT -----  Regarding: schedule this for bypass monday ASAP  Put this on for cabg x 3 possible radial with dibardino 830 Monday please     Devante

## 2024-06-19 NOTE — PLAN OF CARE
Problem: Safety - Adult  Goal: Free from fall injury  6/19/2024 1037 by Gregor Chaney RN  Outcome: Progressing  Flowsheets (Taken 6/19/2024 0800)  Free From Fall Injury: Instruct family/caregiver on patient safety  6/18/2024 2213 by Mattie Forde RN  Outcome: Progressing     Problem: Chronic Conditions and Co-morbidities  Goal: Patient's chronic conditions and co-morbidity symptoms are monitored and maintained or improved  6/19/2024 1037 by Gregor Chaney RN  Outcome: Progressing  6/18/2024 2213 by Mattie Forde RN  Outcome: Progressing  Flowsheets (Taken 6/18/2024 2015)  Care Plan - Patient's Chronic Conditions and Co-Morbidity Symptoms are Monitored and Maintained or Improved: Monitor and assess patient's chronic conditions and comorbid symptoms for stability, deterioration, or improvement     Problem: Discharge Planning  Goal: Discharge to home or other facility with appropriate resources  6/19/2024 1037 by Gregor Chaney RN  Outcome: Progressing  6/18/2024 2213 by Mattie Forde RN  Outcome: Progressing  Flowsheets (Taken 6/18/2024 2015)  Discharge to home or other facility with appropriate resources: Identify barriers to discharge with patient and caregiver     Problem: ABCDS Injury Assessment  Goal: Absence of physical injury  6/19/2024 1037 by Gregor Chaney RN  Outcome: Progressing  Flowsheets (Taken 6/19/2024 0800)  Absence of Physical Injury: Implement safety measures based on patient assessment  6/18/2024 2213 by Mattie Forde RN  Outcome: Progressing  Flowsheets (Taken 6/18/2024 2210)  Absence of Physical Injury: Implement safety measures based on patient assessment

## 2024-06-19 NOTE — PROGRESS NOTES
Transport came to get patient for IR Guided thoracentesis, verbal order and nursing communication order from Dr. Pate to hold heparin and restart once pt. Comes back from thoracentesis.

## 2024-06-19 NOTE — PROGRESS NOTES
Occupational Therapy  [facility] [address] [facility]  Occupational Therapy Initial Assessment    Name: Jesús Min  : 1975  MRN: [id number]  Date of Service: 2024    Discharge Recommendations:  Defer OT at this time (Reorder OT as appropriate)  OT Equipment Recommendations  Equipment Needed: No     Patient Diagnosis(es): The encounter diagnosis was CAD, multiple vessel.  Past Medical History:  has a past medical history of CHF (congestive heart failure) (HCC) and Heart attack (HCC).  Past Surgical History:  has a past surgical history that includes Cardiac catheterization and Cardiac procedure (N/A, 2024).    Assessment   Prognosis: Good  Decision Making: Low Complexity  No Skilled OT: Independent with functional mobility;Independent with ADL's;At baseline function;Safe to return home  REQUIRES OT FOLLOW-UP: No  Activity Tolerance  Activity Tolerance: Patient Tolerated treatment well        Restrictions  Restrictions/Precautions  Restrictions/Precautions: Fall Risk, General Precautions, Up as Tolerated  Required Braces or Orthoses?: No  Position Activity Restriction  Other position/activity restrictions: Activity as tolerated    Subjective   General  Patient assessed for rehabilitation services?: Yes  Family / Caregiver Present: No  Diagnosis: CHF, bibasilar atelectasis, SOB, BLE swelling  Subjective  Subjective: RN approved therapy session, patient states pain at 0/10 at this time, no NT reported by pt, pt agreeable to session and was very pleasant throughout     Social/Functional History  Social/Functional History  Lives With: Spouse, Son, Daughter  Type of Home: Mobile home  Home Layout: One level  Home Access: Ramped entrance  Bathroom Shower/Tub: Tub/Shower unit, Shower chair without back  Bathroom Toilet: Standard  Bathroom Equipment: Grab bars in shower, Hand-held shower, Shower chair  Home Equipment: None  Receives Help From: Family  ADL Assistance: Independent  Homemaking  Treatment Minutes: 8 Minutes       FELIX KILGORE, S/OT

## 2024-06-19 NOTE — CONSULTS
Cardiology Consultation         Date:   6/19/2024  Patient name: Jesús Min  Date of admission:  6/18/2024  3:00 PM  MRN:   [id number]  YOB: 1975    Reason for Admission: NSTEMI/CHF     Chief Complaint: worsening dyspnea     History of present illness:     Following up from [facility]:     49 yr old male with hx of CAD post remote PCI, records not available, has not seen any provider for several years, not on any meds at home, presented with worsening dyspnea on exertion and bilateral LE edema x 2 weeks. Denied any chest pain or angina. Initial ECG showed sinus tach with IVCD. HS troponins elevated with upward trend suggesting NSTEMI. TTE showed severely reduced LVEF. Started on iv diuresis with good response. He is so far > 8L negative. Underwent cardiac cath yesterday which showed multivessel CAD. Transferred to [facility] for CABG evaluation.   Continues to improve clinically this am  Denies any chest pain  Dyspnea significantly improved  No orthopnea  On room air and has been ambulating         Past Medical History:   has a past medical history of CHF (congestive heart failure) (HCC) and Heart attack (HCC).    Past Surgical History:   has a past surgical history that includes Cardiac catheterization and Cardiac procedure (N/A, 6/17/2024).     Home Medications:    Prior to Admission medications    Medication Sig Start Date End Date Taking? Authorizing Provider   guaiFENesin (MUCINEX) 600 MG extended release tablet Take 1 tablet by mouth 2 times daily for 15 days 6/11/24 6/26/24  Ania Serrato APRN - CNP   fluticasone (FLONASE) 50 MCG/ACT nasal spray 2 sprays by Each Nostril route daily 6/11/24   Ania Serrato APRN - CNP   azithromycin (ZITHROMAX Z-IVAN) 250 MG tablet Take 2 tablets (500 mg) on Day 1, and then take 1 tablet (250 mg) on days 2 through 5. 6/11/24   Ania Serrato APRN - CNP   albuterol sulfate HFA (VENTOLIN HFA) 108 (90 Base) MCG/ACT inhaler Inhale 2

## 2024-06-19 NOTE — PLAN OF CARE
Problem: Safety - Adult  Goal: Free from fall injury  6/18/2024 2213 by Mattie Forde RN  Outcome: Progressing  6/18/2024 1954 by Gregor Chaney RN  Outcome: Progressing     Problem: Chronic Conditions and Co-morbidities  Goal: Patient's chronic conditions and co-morbidity symptoms are monitored and maintained or improved  6/18/2024 2213 by Mattie Forde RN  Outcome: Progressing  Flowsheets (Taken 6/18/2024 2015)  Care Plan - Patient's Chronic Conditions and Co-Morbidity Symptoms are Monitored and Maintained or Improved: Monitor and assess patient's chronic conditions and comorbid symptoms for stability, deterioration, or improvement  6/18/2024 1954 by Gregor Chaney RN  Outcome: Progressing     Problem: Discharge Planning  Goal: Discharge to home or other facility with appropriate resources  6/18/2024 2213 by Mattie Forde RN  Outcome: Progressing  Flowsheets (Taken 6/18/2024 2015)  Discharge to home or other facility with appropriate resources: Identify barriers to discharge with patient and caregiver  6/18/2024 1954 by Gregor Chaney RN  Outcome: Progressing     Problem: ABCDS Injury Assessment  Goal: Absence of physical injury  6/18/2024 2213 by Mattie Forde RN  Outcome: Progressing  Flowsheets (Taken 6/18/2024 2210)  Absence of Physical Injury: Implement safety measures based on patient assessment  6/18/2024 1954 by Gregor Chaney RN  Outcome: Progressing

## 2024-06-20 ENCOUNTER — APPOINTMENT (OUTPATIENT)
Dept: VASCULAR LAB | Age: 49
End: 2024-06-20
Attending: STUDENT IN AN ORGANIZED HEALTH CARE EDUCATION/TRAINING PROGRAM
Payer: COMMERCIAL

## 2024-06-20 LAB
ANION GAP SERPL CALCULATED.3IONS-SCNC: 13 MMOL/L (ref 9–16)
ANTI-XA UNFRAC HEPARIN: 0.33 IU/L
ANTI-XA UNFRAC HEPARIN: 0.58 IU/L
ANTI-XA UNFRAC HEPARIN: 0.64 IU/L
ANTI-XA UNFRAC HEPARIN: 0.66 IU/L
ANTI-XA UNFRAC HEPARIN: 0.67 IU/L
BUN SERPL-MCNC: 15 MG/DL (ref 6–20)
CALCIUM SERPL-MCNC: 9.3 MG/DL (ref 8.6–10.4)
CHLORIDE SERPL-SCNC: 103 MMOL/L (ref 98–107)
CO2 SERPL-SCNC: 24 MMOL/L (ref 20–31)
CREAT SERPL-MCNC: 1 MG/DL (ref 0.7–1.2)
ERYTHROCYTE [DISTWIDTH] IN BLOOD BY AUTOMATED COUNT: 15.7 % (ref 11.8–14.4)
GFR, ESTIMATED: 89 ML/MIN/1.73M2
GLUCOSE SERPL-MCNC: 100 MG/DL (ref 74–99)
HCT VFR BLD AUTO: 45 % (ref 40.7–50.3)
HGB BLD-MCNC: 14 G/DL (ref 13–17)
MCH RBC QN AUTO: 27.9 PG (ref 25.2–33.5)
MCHC RBC AUTO-ENTMCNC: 31.1 G/DL (ref 28.4–34.8)
MCV RBC AUTO: 89.8 FL (ref 82.6–102.9)
NRBC BLD-RTO: 0 PER 100 WBC
PLATELET # BLD AUTO: 266 K/UL (ref 138–453)
PMV BLD AUTO: 9.7 FL (ref 8.1–13.5)
POTASSIUM SERPL-SCNC: 3.9 MMOL/L (ref 3.7–5.3)
RBC # BLD AUTO: 5.01 M/UL (ref 4.21–5.77)
SODIUM SERPL-SCNC: 140 MMOL/L (ref 136–145)
VAS LEFT BRACHIAL A DIST PSV: 77.6 CM/S
VAS LEFT CCA DIST EDV: 17.4 CM/S
VAS LEFT CCA DIST PSV: 57.2 CM/S
VAS LEFT CCA MID EDV: 15.5 CM/S
VAS LEFT CCA MID PSV: 65.2 CM/S
VAS LEFT CCA PROX EDV: 18.6 CM/S
VAS LEFT CCA PROX PSV: 81.4 CM/S
VAS LEFT ECA EDV: 16.2 CM/S
VAS LEFT ECA PSV: 96.3 CM/S
VAS LEFT GSV ANKLE DIAM: 3.1 MM
VAS LEFT GSV AT KNEE DIAM: 2.1 MM
VAS LEFT GSV BK DIST DIAM: 1.9 MM
VAS LEFT GSV BK PROX DIAM: 4 MM
VAS LEFT GSV JUNC DIAM: 7.9 MM
VAS LEFT GSV THIGH MID DIAM: 3.5 MM
VAS LEFT ICA DIST EDV: 15.5 CM/S
VAS LEFT ICA DIST PSV: 54 CM/S
VAS LEFT ICA MID EDV: 20.5 CM/S
VAS LEFT ICA MID PSV: 60.9 CM/S
VAS LEFT ICA PROX EDV: 21.1 CM/S
VAS LEFT ICA PROX PSV: 69.6 CM/S
VAS LEFT RADIAL A DIST PSV: 69.4 CM/S
VAS LEFT RADIAL A MID PSV: 63.1 CM/S
VAS LEFT RADIAL A PROX PSV: 63.1 CM/S
VAS LEFT RADIAL DIST AP DIAM: 0.27 CM
VAS LEFT RADIAL DIST TR DIAM: 0.32 CM
VAS LEFT RADIAL MID AP DIAM: 0.26 CM
VAS LEFT RADIAL MID TR DIAM: 0.27 CM
VAS LEFT RADIAL PROX AP DIAM: 0.29 CM
VAS LEFT RADIAL PROX TR DIAM: 0.31 CM
VAS LEFT ULNAR A DIST PSV: 25.4 CM/S
VAS LEFT ULNAR A MID PSV: 25.6 CM/S
VAS LEFT ULNAR A PROX PSV: 43.5 CM/S
VAS LEFT ULNAR DIST AP DIAM: 0.16 CM
VAS LEFT ULNAR DIST TR DIAM: 0.16 CM
VAS LEFT ULNAR MID AP DIAM: 0.19 CM
VAS LEFT ULNAR MID TR DIAM: 0.2 CM
VAS LEFT ULNAR PROX AP DIAM: 0.32 CM
VAS LEFT ULNAR PROX TR DIAM: 0.35 CM
VAS LEFT VERTEBRAL EDV: 5.95 CM/S
VAS LEFT VERTEBRAL PSV: 18.2 CM/S
VAS RIGHT BRACHIAL A DIST PSV: 63.1 CM/S
VAS RIGHT CCA DIST EDV: 21.1 CM/S
VAS RIGHT CCA DIST PSV: 62.7 CM/S
VAS RIGHT CCA MID EDV: 19.3 CM/S
VAS RIGHT CCA MID PSV: 78.9 CM/S
VAS RIGHT CCA PROX EDV: 12.4 CM/S
VAS RIGHT CCA PROX PSV: 88.2 CM/S
VAS RIGHT ECA EDV: 13 CM/S
VAS RIGHT ECA PSV: 75.8 CM/S
VAS RIGHT GSV ANKLE DIAM: 3.3 MM
VAS RIGHT GSV AT KNEE DIAM: 3.4 MM
VAS RIGHT GSV BK DIST DIAM: 3.2 MM
VAS RIGHT GSV BK PROX DIAM: 1.4 MM
VAS RIGHT GSV JUNC DIAM: 5.1 MM
VAS RIGHT GSV THIGH MID DIAM: 2.5 MM
VAS RIGHT ICA DIST EDV: 24.9 CM/S
VAS RIGHT ICA DIST PSV: 66.5 CM/S
VAS RIGHT ICA MID EDV: 16.8 CM/S
VAS RIGHT ICA MID PSV: 62.1 CM/S
VAS RIGHT ICA PROX EDV: 32.3 CM/S
VAS RIGHT ICA PROX PSV: 80.8 CM/S
VAS RIGHT RADIAL A DIST PSV: 54.7 CM/S
VAS RIGHT RADIAL A MID PSV: 36.5 CM/S
VAS RIGHT RADIAL A PROX PSV: 40 CM/S
VAS RIGHT RADIAL DIST AP DIAM: 0.27 CM
VAS RIGHT RADIAL DIST TR DIAM: 0.28 CM
VAS RIGHT RADIAL MID AP DIAM: 0.29 CM
VAS RIGHT RADIAL MID TR DIAM: 0.32 CM
VAS RIGHT RADIAL PROX AP DIAM: 0.38 CM
VAS RIGHT RADIAL PROX TR DIAM: 0.39 CM
VAS RIGHT ULNAR A DIST PSV: 44.9 CM/S
VAS RIGHT ULNAR A MID PSV: 45.6 CM/S
VAS RIGHT ULNAR A PROX PSV: 63.1 CM/S
VAS RIGHT ULNAR DIST AP DIAM: 0.26 CM
VAS RIGHT ULNAR DIST TR DIAM: 0.26 CM
VAS RIGHT ULNAR MID AP DIAM: 0.26 CM
VAS RIGHT ULNAR MID TR DIAM: 0.26 CM
VAS RIGHT ULNAR PROX AP DIAM: 0.4 CM
VAS RIGHT ULNAR PROX TR DIAM: 0.4 CM
VAS RIGHT VERTEBRAL EDV: 6.66 CM/S
VAS RIGHT VERTEBRAL PSV: 33.6 CM/S
WBC OTHER # BLD: 10.3 K/UL (ref 3.5–11.3)

## 2024-06-20 PROCEDURE — 6370000000 HC RX 637 (ALT 250 FOR IP): Performed by: HOSPITALIST

## 2024-06-20 PROCEDURE — 99231 SBSQ HOSP IP/OBS SF/LOW 25: CPT | Performed by: NURSE PRACTITIONER

## 2024-06-20 PROCEDURE — 93970 EXTREMITY STUDY: CPT

## 2024-06-20 PROCEDURE — 93930 UPPER EXTREMITY STUDY: CPT

## 2024-06-20 PROCEDURE — 6370000000 HC RX 637 (ALT 250 FOR IP): Performed by: INTERNAL MEDICINE

## 2024-06-20 PROCEDURE — 99232 SBSQ HOSP IP/OBS MODERATE 35: CPT | Performed by: INTERNAL MEDICINE

## 2024-06-20 PROCEDURE — 80048 BASIC METABOLIC PNL TOTAL CA: CPT

## 2024-06-20 PROCEDURE — 85027 COMPLETE CBC AUTOMATED: CPT

## 2024-06-20 PROCEDURE — 2060000000 HC ICU INTERMEDIATE R&B

## 2024-06-20 PROCEDURE — 36415 COLL VENOUS BLD VENIPUNCTURE: CPT

## 2024-06-20 PROCEDURE — 85520 HEPARIN ASSAY: CPT

## 2024-06-20 PROCEDURE — 93971 EXTREMITY STUDY: CPT | Performed by: SURGERY

## 2024-06-20 PROCEDURE — 2580000003 HC RX 258: Performed by: STUDENT IN AN ORGANIZED HEALTH CARE EDUCATION/TRAINING PROGRAM

## 2024-06-20 PROCEDURE — 93880 EXTRACRANIAL BILAT STUDY: CPT

## 2024-06-20 PROCEDURE — 6370000000 HC RX 637 (ALT 250 FOR IP): Performed by: STUDENT IN AN ORGANIZED HEALTH CARE EDUCATION/TRAINING PROGRAM

## 2024-06-20 PROCEDURE — 93930 UPPER EXTREMITY STUDY: CPT | Performed by: SURGERY

## 2024-06-20 PROCEDURE — 6360000002 HC RX W HCPCS: Performed by: HOSPITALIST

## 2024-06-20 PROCEDURE — 93880 EXTRACRANIAL BILAT STUDY: CPT | Performed by: SURGERY

## 2024-06-20 PROCEDURE — 99233 SBSQ HOSP IP/OBS HIGH 50: CPT | Performed by: INTERNAL MEDICINE

## 2024-06-20 RX ORDER — FUROSEMIDE 10 MG/ML
20 INJECTION INTRAMUSCULAR; INTRAVENOUS 3 TIMES DAILY
Status: DISCONTINUED | OUTPATIENT
Start: 2024-06-20 | End: 2024-06-20

## 2024-06-20 RX ORDER — FUROSEMIDE 40 MG/1
40 TABLET ORAL DAILY
Status: DISCONTINUED | OUTPATIENT
Start: 2024-06-20 | End: 2024-06-24

## 2024-06-20 RX ADMIN — METOPROLOL TARTRATE 12.5 MG: 25 TABLET, FILM COATED ORAL at 10:52

## 2024-06-20 RX ADMIN — METOPROLOL TARTRATE 12.5 MG: 25 TABLET, FILM COATED ORAL at 21:40

## 2024-06-20 RX ADMIN — HEPARIN SODIUM 19 UNITS/KG/HR: 10000 INJECTION, SOLUTION INTRAVENOUS at 07:59

## 2024-06-20 RX ADMIN — ASPIRIN 81 MG 81 MG: 81 TABLET ORAL at 10:52

## 2024-06-20 RX ADMIN — HYDROXYZINE HYDROCHLORIDE 25 MG: 25 TABLET ORAL at 00:00

## 2024-06-20 RX ADMIN — SODIUM CHLORIDE, PRESERVATIVE FREE 10 ML: 5 INJECTION INTRAVENOUS at 10:53

## 2024-06-20 RX ADMIN — HEPARIN SODIUM 19 UNITS/KG/HR: 10000 INJECTION, SOLUTION INTRAVENOUS at 20:00

## 2024-06-20 RX ADMIN — SODIUM CHLORIDE, PRESERVATIVE FREE 10 ML: 5 INJECTION INTRAVENOUS at 21:40

## 2024-06-20 RX ADMIN — HYDROXYZINE HYDROCHLORIDE 25 MG: 25 TABLET ORAL at 10:53

## 2024-06-20 RX ADMIN — POTASSIUM CHLORIDE 10 MEQ: 1500 TABLET, EXTENDED RELEASE ORAL at 10:52

## 2024-06-20 RX ADMIN — ATORVASTATIN CALCIUM 80 MG: 80 TABLET, FILM COATED ORAL at 21:39

## 2024-06-20 RX ADMIN — FUROSEMIDE 40 MG: 40 TABLET ORAL at 10:57

## 2024-06-20 NOTE — PROGRESS NOTES
[facility] Cardiothoracic Surgery  Progress Note    6/20/2024 2:45 PM    Subjective:  Mr. Min   No chest pain or shortness of breath alert and oriented x 4  Objective:  /71   Pulse 68   Temp 98 °F (36.7 °C) (Oral)   Resp 19   SpO2 97%   Chest: pacing wires: no, chest tubes:no, air leak no, 0 +  CV: no murmur noted, Normal S1, S2,   Lungs: clear to auscultation, no wheezes, rales, or rhonchi  Abd: normal bowel sounds   Lower Extremities: Trace edema    Successful drainage of 500 mL of fluid from the right thorax  Reviewed vein mapping, carotid ultrasound, upper extremity ultrasound.  No concerns noted    Labs: Labs reviewed today  CBC:   Recent Labs     06/18/24  1542 06/19/24  0834 06/20/24  0643   WBC 10.4 10.2 10.3   HGB 13.5 13.6 14.0   HCT 47.6 44.0 45.0   MCV 98.3 89.2 89.8    263 266     BMP:   Recent Labs     06/18/24  0141 06/19/24  0834 06/20/24  0643    139 140   K 4.5 4.0 3.9    104 103   CO2 28 25 24   BUN 16 13 15   CREATININE 1.0 1.0 1.0       I/O: I/O last 3 completed shifts:  In: 700 [P.O.:700]  Out: -   Scheduled Meds:   furosemide  40 mg Oral Daily    metoprolol tartrate  12.5 mg Oral BID    sodium chloride flush  5-40 mL IntraVENous 2 times per day    atorvastatin  80 mg Oral Nightly    aspirin  81 mg Oral Daily    potassium chloride  10 mEq Oral Daily     Continuous Infusions:   sodium chloride      heparin (PORCINE) Infusion 19 Units/kg/hr (06/20/24 0759)     PRN Meds:potassium chloride **OR** potassium alternative oral replacement **OR** potassium chloride, sodium chloride flush, sodium chloride, magnesium sulfate, ondansetron **OR** ondansetron, polyethylene glycol, acetaminophen **OR** acetaminophen, heparin (porcine), heparin (porcine), sodium chloride flush, albuterol sulfate HFA, hydrOXYzine HCl, ipratropium 0.5 mg-albuterol 2.5 mg      Daily Nursing Care:  Please keep SCDS in place as DVT prophylaxis  If not intubated, Please have patient use IS and acapella

## 2024-06-20 NOTE — PLAN OF CARE
Problem: Safety - Adult  Goal: Free from fall injury  6/20/2024 0035 by Margy Joseph RN  Outcome: Progressing     Problem: Chronic Conditions and Co-morbidities  Goal: Patient's chronic conditions and co-morbidity symptoms are monitored and maintained or improved  6/20/2024 0035 by Margy Joseph RN  Outcome: Progressing     Problem: Discharge Planning  Goal: Discharge to home or other facility with appropriate resources  6/20/2024 0035 by Margy Joseph RN  Outcome: Progressing     Problem: ABCDS Injury Assessment  Goal: Absence of physical injury  6/20/2024 0035 by Margy Joseph RN  Outcome: Progressing

## 2024-06-20 NOTE — PROGRESS NOTES
Cardiology Progress Note                     Date:   6/20/2024  Patient name: Jesús Min  Date of admission:  6/18/2024  3:00 PM  MRN:   [id number]  YOB: 1975  PCP: No primary care provider on file.    Reason for Admission:  NSTEMI, HFrEF    Subjective:       There were no acute events overnight, remained hemodynamically stable, denies chest pain, dyspnea and lower extremity edema significantly improved, overall > 8 L negative since admission, underwent right thoracentesis yesterday with drainage of only 500 cc fluids, on room air, ambulating      Scheduled Meds:   furosemide  20 mg IntraVENous TID    metoprolol tartrate  12.5 mg Oral BID    sodium chloride flush  5-40 mL IntraVENous 2 times per day    atorvastatin  80 mg Oral Nightly    aspirin  81 mg Oral Daily    potassium chloride  10 mEq Oral Daily       Continuous Infusions:   sodium chloride      heparin (PORCINE) Infusion 19 Units/kg/hr (06/20/24 0759)       Labs:     CBC:   Recent Labs     06/18/24  1542 06/19/24  0834 06/20/24  0643   WBC 10.4 10.2 10.3   HGB 13.5 13.6 14.0    263 266     BMP:    Recent Labs     06/18/24  0141 06/19/24  0834 06/20/24  0643    139 140   K 4.5 4.0 3.9    104 103   CO2 28 25 24   BUN 16 13 15   CREATININE 1.0 1.0 1.0   GLUCOSE 94 104* 100*     Hepatic:   No results for input(s): \"AST\", \"ALT\", \"BILITOT\", \"ALKPHOS\" in the last 72 hours.    Invalid input(s): \"ALB\"    Troponin: No results for input(s): \"TROPONINI\" in the last 72 hours.  BNP: No results for input(s): \"BNP\" in the last 72 hours.  Lipids: No results for input(s): \"CHOL\", \"HDL\" in the last 72 hours.    Invalid input(s): \"LDLCALCU\"  INR:   Recent Labs     06/18/24  1542   INR 1.1         Objective:     Vitals: /71   Pulse 68   Temp 98 °F (36.7 °C) (Oral)   Resp 19   SpO2 97%     General appearance: awake, alert, in no apparent respiratory distress on room air  HEENT: Head: Normocephalic, no lesions, without

## 2024-06-20 NOTE — PROGRESS NOTES
[facility]  Office: [phone]  Francis Wynne DO, Barry Devries, DO, Wei Downing DO, Khris Scott, DO, David Evans MD, Kenia Gillis MD, Cecil Hampton MD, Jessy Franco MD,  Leroy Hill MD, Florecita Moran MD, Dylan Yeager MD,  Rio Collazo DO, Taisha Andrew MD, Donn Parks MD, Paul Wynne DO, Olimpia Hernandez MD,  Zia Dowd DO, Bindu Danielson MD, Any Miller MD, Sherly Angeles MD, Cl Alexander MD,  Jorge Luis Bahena MD, Robin Wilson MD, Bubba Candelario MD, Dahlia Stapleton MD, Elder Rios MD, Dinesh Ledezma MD, Raj Jenkins DO, Ranjan Rashid DO, Aubrey Rodriguez MD,  Edu Vazquez MD, Shirley Waterhouse, CNP,  Chelle Perdomo CNP, Ricardo Webb, CNP,  Ellen Jacinto, DNP, Bess Tao, CNP, Saira Taveras, CNP, Francine June CNP, Kati Toussaint, CNP, Sidra García, PA-C, Farrah Jose PA-C, Ronel Hudson, CNP, Kendal Flor, CNP, Prakash Gardiner, CNP, Mayela Sweeney, CNP, Darlyn Izaguirre, CNP, Shauna Gibbons, CNS, Priyanka Maldonado, CNP, Jacquelin Bucio CNP, Tracy Schwab, CNP         [facility]   IN-PATIENT SERVICE   [facility]    Progress Note    6/20/2024    1:07 PM    Name:   Jesús Min  MRN:     [id number]     Acct:      [id number]   Room:   0540/0540-01   Day:  2  Admit Date:  6/18/2024  3:00 PM    PCP:   No primary care provider on file.  Code Status:  Full Code    Subjective:     Patient doing well today.  No chest pain.  No shortness of breath.  Has no complaints.        Brief History:     This is a 49-year-old male who presented to the hospital as a transfer for evaluation for CABG    Medications:     Allergies:    Allergies   Allergen Reactions    Codeine Nausea Only    Pcn [Penicillins]        Current Meds:   Scheduled Meds:    furosemide  40 mg Oral Daily    metoprolol tartrate  12.5 mg Oral BID    sodium chloride flush  5-40 mL IntraVENous 2 times per day    atorvastatin  80 mg Oral Nightly    aspirin  81 mg

## 2024-06-20 NOTE — CARE COORDINATION
Transitional planning-plan is home with family, has ride. CABG scheduled for 6-. Order for wearable defibrillator-needs ordered for after surgery.

## 2024-06-20 NOTE — PLAN OF CARE
Problem: Safety - Adult  Goal: Free from fall injury  Outcome: Progressing  Flowsheets (Taken 6/20/2024 0800)  Free From Fall Injury: Instruct family/caregiver on patient safety     Problem: Chronic Conditions and Co-morbidities  Goal: Patient's chronic conditions and co-morbidity symptoms are monitored and maintained or improved  Outcome: Progressing  Flowsheets (Taken 6/20/2024 0800)  Care Plan - Patient's Chronic Conditions and Co-Morbidity Symptoms are Monitored and Maintained or Improved: Monitor and assess patient's chronic conditions and comorbid symptoms for stability, deterioration, or improvement     Problem: Discharge Planning  Goal: Discharge to home or other facility with appropriate resources  Outcome: Progressing  Flowsheets (Taken 6/20/2024 0800)  Discharge to home or other facility with appropriate resources: Identify barriers to discharge with patient and caregiver     Problem: ABCDS Injury Assessment  Goal: Absence of physical injury  Outcome: Progressing  Flowsheets (Taken 6/20/2024 0800)  Absence of Physical Injury: Implement safety measures based on patient assessment

## 2024-06-21 ENCOUNTER — APPOINTMENT (OUTPATIENT)
Age: 49
End: 2024-06-21
Attending: STUDENT IN AN ORGANIZED HEALTH CARE EDUCATION/TRAINING PROGRAM
Payer: COMMERCIAL

## 2024-06-21 LAB
ANION GAP SERPL CALCULATED.3IONS-SCNC: 11 MMOL/L (ref 9–16)
ANTI-XA UNFRAC HEPARIN: 0.69 IU/L
BUN SERPL-MCNC: 14 MG/DL (ref 6–20)
CALCIUM SERPL-MCNC: 9.4 MG/DL (ref 8.6–10.4)
CHLORIDE SERPL-SCNC: 104 MMOL/L (ref 98–107)
CO2 SERPL-SCNC: 26 MMOL/L (ref 20–31)
CREAT SERPL-MCNC: 1 MG/DL (ref 0.7–1.2)
ECHO BSA: 2.3 M2
ECHO LV EJECTION FRACTION BIPLANE: 23 % (ref 55–100)
GFR, ESTIMATED: >90 ML/MIN/1.73M2
GLUCOSE SERPL-MCNC: 129 MG/DL (ref 74–99)
LEFT VENTRICULAR EJECTION FRACTION MODE: NORMAL
LV EF: 23 %
POTASSIUM SERPL-SCNC: 4 MMOL/L (ref 3.7–5.3)
SODIUM SERPL-SCNC: 141 MMOL/L (ref 136–145)

## 2024-06-21 PROCEDURE — 6370000000 HC RX 637 (ALT 250 FOR IP): Performed by: STUDENT IN AN ORGANIZED HEALTH CARE EDUCATION/TRAINING PROGRAM

## 2024-06-21 PROCEDURE — 6370000000 HC RX 637 (ALT 250 FOR IP): Performed by: INTERNAL MEDICINE

## 2024-06-21 PROCEDURE — 99233 SBSQ HOSP IP/OBS HIGH 50: CPT | Performed by: INTERNAL MEDICINE

## 2024-06-21 PROCEDURE — 93308 TTE F-UP OR LMTD: CPT

## 2024-06-21 PROCEDURE — 6360000002 HC RX W HCPCS: Performed by: HOSPITALIST

## 2024-06-21 PROCEDURE — 99231 SBSQ HOSP IP/OBS SF/LOW 25: CPT | Performed by: NURSE PRACTITIONER

## 2024-06-21 PROCEDURE — 93308 TTE F-UP OR LMTD: CPT | Performed by: INTERNAL MEDICINE

## 2024-06-21 PROCEDURE — 6370000000 HC RX 637 (ALT 250 FOR IP): Performed by: HOSPITALIST

## 2024-06-21 PROCEDURE — 85520 HEPARIN ASSAY: CPT

## 2024-06-21 PROCEDURE — 36415 COLL VENOUS BLD VENIPUNCTURE: CPT

## 2024-06-21 PROCEDURE — 2060000000 HC ICU INTERMEDIATE R&B

## 2024-06-21 PROCEDURE — 2580000003 HC RX 258: Performed by: STUDENT IN AN ORGANIZED HEALTH CARE EDUCATION/TRAINING PROGRAM

## 2024-06-21 PROCEDURE — 99232 SBSQ HOSP IP/OBS MODERATE 35: CPT | Performed by: INTERNAL MEDICINE

## 2024-06-21 PROCEDURE — 80048 BASIC METABOLIC PNL TOTAL CA: CPT

## 2024-06-21 RX ORDER — VALSARTAN 40 MG/1
40 TABLET ORAL DAILY
Status: DISCONTINUED | OUTPATIENT
Start: 2024-06-21 | End: 2024-06-24

## 2024-06-21 RX ADMIN — METOPROLOL TARTRATE 12.5 MG: 25 TABLET, FILM COATED ORAL at 08:17

## 2024-06-21 RX ADMIN — HYDROXYZINE HYDROCHLORIDE 25 MG: 25 TABLET ORAL at 00:26

## 2024-06-21 RX ADMIN — HEPARIN SODIUM 19 UNITS/KG/HR: 10000 INJECTION, SOLUTION INTRAVENOUS at 06:01

## 2024-06-21 RX ADMIN — POTASSIUM CHLORIDE 10 MEQ: 1500 TABLET, EXTENDED RELEASE ORAL at 08:17

## 2024-06-21 RX ADMIN — METOPROLOL TARTRATE 12.5 MG: 25 TABLET, FILM COATED ORAL at 20:48

## 2024-06-21 RX ADMIN — HEPARIN SODIUM 19 UNITS/KG/HR: 10000 INJECTION, SOLUTION INTRAVENOUS at 17:05

## 2024-06-21 RX ADMIN — SODIUM CHLORIDE, PRESERVATIVE FREE 10 ML: 5 INJECTION INTRAVENOUS at 08:17

## 2024-06-21 RX ADMIN — ATORVASTATIN CALCIUM 80 MG: 80 TABLET, FILM COATED ORAL at 20:48

## 2024-06-21 RX ADMIN — FUROSEMIDE 40 MG: 40 TABLET ORAL at 08:17

## 2024-06-21 RX ADMIN — ASPIRIN 81 MG 81 MG: 81 TABLET ORAL at 08:17

## 2024-06-21 RX ADMIN — SODIUM CHLORIDE, PRESERVATIVE FREE 10 ML: 5 INJECTION INTRAVENOUS at 20:49

## 2024-06-21 NOTE — CARE COORDINATION
Transitional planning-talked with Devante with [facility]. Asked if I should order the wearable defibrillator before the CABG. His response-yes. Faxed face sheet, order, progress note, echo report, and cath report to [facility]. Talked with Jono.

## 2024-06-21 NOTE — PROGRESS NOTES
[facility]  Office: [phone]  Francis Wynne DO, Barry Devries, DO, Wei Downing DO, Khris Scott, DO, David Evans MD, Kenia Gillis MD, Cecil Hampton MD, Jessy Franco MD,  Leroy Hill MD, Florecita Moran MD, Dylan Yeager MD,  Rio Collazo DO, Taisha Andrew MD, Donn Parks MD, Paul Wynne DO, Olimpia Hernandez MD,  Zia Dowd DO, Bindu Danielson MD, Any Miller MD, Sherly Angeles MD, Cl Alexander MD,  Jorge Luis Bahena MD, Robin Wilson MD, Bubba Candelario MD, Dahlia Stapleton MD, Elder Rios MD, Dinesh Ledezma MD, Raj Jenkins DO, Ranjan Rashid DO, Aubrey Rodriguez MD,  Edu Vazquez MD, Shirley Waterhouse, CNP,  Chelle Perdomo CNP, Ricardo Webb, CNP,  Ellen Jacinto, DNP, Bess Tao, CNP, Saira Taveras, CNP, Francine June CNP, Kati Toussaint, CNP, Sidra García, PA-C, Farrah Jose PA-C, Ronel Hudson, CNP, Kendal Flor, CNP, Prakash Gardiner, CNP, Mayela Sweeney, CNP, Darlyn Izaguirre, CNP, Shauna Gibbons, CNS, Priyanka Maldonado, CNP, Jacquelin Bucio CNP, Tracy Schwab, CNP         [facility]   IN-PATIENT SERVICE   [facility]    Progress Note    6/21/2024    2:38 PM    Name:   Jesús Min  MRN:     [id number]     Acct:      [id number]   Room:   0540/0540-01   Day:  3  Admit Date:  6/18/2024  3:00 PM    PCP:   No primary care provider on file.  Code Status:  Full Code    Subjective:     Patient doing well today.  No chest pain.  No shortness of breath.  Has no complaints.  Edema improved       Brief History:     This is a 49-year-old male who presented to the hospital as a transfer for evaluation for CABG    Medications:     Allergies:    Allergies   Allergen Reactions    Codeine Nausea Only    Pcn [Penicillins]        Current Meds:   Scheduled Meds:    valsartan  40 mg Oral Daily    furosemide  40 mg Oral Daily    metoprolol tartrate  12.5 mg Oral BID    sodium chloride flush  5-40 mL IntraVENous 2 times per day     catheterization shows multivessel coronary artery disease  Patient transferred here for CABG evaluation  Moderate right pleural effusion  New diagnosis of HFrEF due to ischemic cardiomyopathy  -8.8 L since admission  Mild mitral and tricuspid regurgitation  Dyslipidemia  Obesity BMI of 40  Tobacco abuse      Plan for CABG Monday.  Continue aspirin, Lipitor and heparin gtt. continue  beta-blocker.    Continue oral Lasix 40 mg daily.  Add goal-directed medical therapy as tolerated after surgery.    Strict I's and O's.  2 g sodium restriction.  Appears euvolemic today  Continue statin for NSTEMI and dyslipidemia  PTOT    Medical Decision Making: Osei Collazo DO  6/21/2024  2:38 PM

## 2024-06-21 NOTE — PROGRESS NOTES
Cardiology Progress Note                     Date:   6/21/2024  Patient name: Jesús Min  Date of admission:  6/18/2024  3:00 PM  MRN:   [id number]  YOB: 1975  PCP: No primary care provider on file.    Reason for Admission:  NSTEMI, HFrEF    Subjective:       There were no acute events overnight, remained hemodynamically stable, denies chest pain, dyspnea and lower extremity edema significantly improved    Scheduled Meds:   valsartan  40 mg Oral Daily    furosemide  40 mg Oral Daily    metoprolol tartrate  12.5 mg Oral BID    sodium chloride flush  5-40 mL IntraVENous 2 times per day    atorvastatin  80 mg Oral Nightly    aspirin  81 mg Oral Daily    potassium chloride  10 mEq Oral Daily       Continuous Infusions:   sodium chloride      heparin (PORCINE) Infusion 19 Units/kg/hr (06/21/24 0601)       Labs:     CBC:   Recent Labs     06/18/24  1542 06/19/24  0834 06/20/24  0643   WBC 10.4 10.2 10.3   HGB 13.5 13.6 14.0    263 266     BMP:    Recent Labs     06/19/24  0834 06/20/24  0643 06/21/24  0457    140 141   K 4.0 3.9 4.0    103 104   CO2 25 24 26   BUN 13 15 14   CREATININE 1.0 1.0 1.0   GLUCOSE 104* 100* 129*     Hepatic:   No results for input(s): \"AST\", \"ALT\", \"BILITOT\", \"ALKPHOS\" in the last 72 hours.    Invalid input(s): \"ALB\"    Troponin: No results for input(s): \"TROPONINI\" in the last 72 hours.  BNP: No results for input(s): \"BNP\" in the last 72 hours.  Lipids: No results for input(s): \"CHOL\", \"HDL\" in the last 72 hours.    Invalid input(s): \"LDLCALCU\"  INR:   Recent Labs     06/18/24  1542   INR 1.1         Objective:     Vitals: /74   Pulse 78   Temp 98 °F (36.7 °C) (Oral)   Resp 18   Ht 1.68 m (5' 6.14\")   Wt 113.1 kg (249 lb 5.4 oz)   SpO2 95%   BMI 40.07 kg/m²     General appearance: awake, alert, in no apparent respiratory distress on room air  HEENT: Head: Normocephalic, no lesions, without obvious abnormality  Neck: no JVD  Lungs:

## 2024-06-21 NOTE — PLAN OF CARE
Problem: Safety - Adult  Goal: Free from fall injury  6/21/2024 0310 by Margy Joseph RN  Outcome: Progressing     Problem: Chronic Conditions and Co-morbidities  Goal: Patient's chronic conditions and co-morbidity symptoms are monitored and maintained or improved  6/21/2024 0310 by Margy Joseph RN  Outcome: Progressing     Problem: Discharge Planning  Goal: Discharge to home or other facility with appropriate resources  6/21/2024 0310 by Margy Joseph RN  Outcome: Progressing     Problem: ABCDS Injury Assessment  Goal: Absence of physical injury  6/21/2024 0310 by Margy Joseph RN  Outcome: Progressing

## 2024-06-21 NOTE — PROGRESS NOTES
[facility] Cardiothoracic Surgery  Progress Note    6/21/2024 10:09 AM    Subjective:  Mr. Min   No chest pain or shortness of breath alert and oriented x 4  Objective:  /74   Pulse 78   Temp 98 °F (36.7 °C) (Oral)   Resp 18   Ht 1.68 m (5' 6.14\")   Wt 113.1 kg (249 lb 5.4 oz)   SpO2 95%   BMI 40.07 kg/m²   Chest: pacing wires: no, chest tubes:no, air leak no, 0 +  CV: no murmur noted, Normal S1, S2,   Lungs: clear to auscultation, no wheezes, rales, or rhonchi  Abd: normal bowel sounds   Lower Extremities: Trace edema    Successful drainage of 500 mL of fluid from the right thorax  Reviewed vein mapping, carotid ultrasound, upper extremity ultrasound.  No concerns noted    Labs: Labs reviewed today  CBC:   Recent Labs     06/18/24  1542 06/19/24  0834 06/20/24  0643   WBC 10.4 10.2 10.3   HGB 13.5 13.6 14.0   HCT 47.6 44.0 45.0   MCV 98.3 89.2 89.8    263 266     BMP:   Recent Labs     06/19/24  0834 06/20/24  0643 06/21/24  0457    140 141   K 4.0 3.9 4.0    103 104   CO2 25 24 26   BUN 13 15 14   CREATININE 1.0 1.0 1.0       I/O: I/O last 3 completed shifts:  In: 1100 [P.O.:1100]  Out: -   Scheduled Meds:   valsartan  40 mg Oral Daily    furosemide  40 mg Oral Daily    metoprolol tartrate  12.5 mg Oral BID    sodium chloride flush  5-40 mL IntraVENous 2 times per day    atorvastatin  80 mg Oral Nightly    aspirin  81 mg Oral Daily    potassium chloride  10 mEq Oral Daily     Continuous Infusions:   sodium chloride      heparin (PORCINE) Infusion 19 Units/kg/hr (06/21/24 0601)     PRN Meds:potassium chloride **OR** potassium alternative oral replacement **OR** potassium chloride, sodium chloride flush, sodium chloride, magnesium sulfate, ondansetron **OR** ondansetron, polyethylene glycol, acetaminophen **OR** acetaminophen, heparin (porcine), heparin (porcine), sodium chloride flush, albuterol sulfate HFA, hydrOXYzine HCl, ipratropium 0.5 mg-albuterol 2.5 mg      Daily Nursing  Care:  Please keep SCDS in place as DVT prophylaxis  If not intubated, Please have patient use IS and acapella 10X/hr or during commercial breaks  Please continue BM management  Daily labs and CXR  Daily PT/OT and ambulation  Continue with Case Management for DC planning      Assessment/ Plan:    6/21/24  Limited echo ordered today.  We appreciate cardiology assistance with heart failure management.  Patient has been aggressively diuresed  Plan for bypass surgery Monday at 830 with Dr. James  We reviewed the vein mapping, carotid ultrasound, upper extremity ultrasound    6/20/24  Repeat limited echocardiogram after aggressive diuresis.  Patient is over 8 L negative.  Would like to see an improvement in ejection fraction.  We are currently planning for bypass surgery be scheduled on Monday at 830 with Dr. James.  JOSE ALVARADO - NP

## 2024-06-22 LAB
ANION GAP SERPL CALCULATED.3IONS-SCNC: 9 MMOL/L (ref 9–16)
ANTI-XA UNFRAC HEPARIN: 0.61 IU/L
ANTI-XA UNFRAC HEPARIN: 0.66 IU/L
ANTI-XA UNFRAC HEPARIN: 0.92 IU/L
BUN SERPL-MCNC: 10 MG/DL (ref 6–20)
CALCIUM SERPL-MCNC: 9.4 MG/DL (ref 8.6–10.4)
CHLORIDE SERPL-SCNC: 105 MMOL/L (ref 98–107)
CO2 SERPL-SCNC: 26 MMOL/L (ref 20–31)
CREAT SERPL-MCNC: 0.9 MG/DL (ref 0.7–1.2)
ERYTHROCYTE [DISTWIDTH] IN BLOOD BY AUTOMATED COUNT: 15.4 % (ref 11.8–14.4)
GFR, ESTIMATED: >90 ML/MIN/1.73M2
GLUCOSE SERPL-MCNC: 115 MG/DL (ref 74–99)
HCT VFR BLD AUTO: 46.4 % (ref 40.7–50.3)
HGB BLD-MCNC: 14.4 G/DL (ref 13–17)
MCH RBC QN AUTO: 28 PG (ref 25.2–33.5)
MCHC RBC AUTO-ENTMCNC: 31 G/DL (ref 28.4–34.8)
MCV RBC AUTO: 90.1 FL (ref 82.6–102.9)
NRBC BLD-RTO: 0 PER 100 WBC
PLATELET # BLD AUTO: 254 K/UL (ref 138–453)
PMV BLD AUTO: 9.5 FL (ref 8.1–13.5)
POTASSIUM SERPL-SCNC: 4.1 MMOL/L (ref 3.7–5.3)
RBC # BLD AUTO: 5.15 M/UL (ref 4.21–5.77)
SODIUM SERPL-SCNC: 140 MMOL/L (ref 136–145)
WBC OTHER # BLD: 9.9 K/UL (ref 3.5–11.3)

## 2024-06-22 PROCEDURE — 80048 BASIC METABOLIC PNL TOTAL CA: CPT

## 2024-06-22 PROCEDURE — 6370000000 HC RX 637 (ALT 250 FOR IP): Performed by: INTERNAL MEDICINE

## 2024-06-22 PROCEDURE — 36415 COLL VENOUS BLD VENIPUNCTURE: CPT

## 2024-06-22 PROCEDURE — 6360000002 HC RX W HCPCS: Performed by: HOSPITALIST

## 2024-06-22 PROCEDURE — 85520 HEPARIN ASSAY: CPT

## 2024-06-22 PROCEDURE — 2060000000 HC ICU INTERMEDIATE R&B

## 2024-06-22 PROCEDURE — 6370000000 HC RX 637 (ALT 250 FOR IP): Performed by: HOSPITALIST

## 2024-06-22 PROCEDURE — 99232 SBSQ HOSP IP/OBS MODERATE 35: CPT | Performed by: INTERNAL MEDICINE

## 2024-06-22 PROCEDURE — 6370000000 HC RX 637 (ALT 250 FOR IP): Performed by: STUDENT IN AN ORGANIZED HEALTH CARE EDUCATION/TRAINING PROGRAM

## 2024-06-22 PROCEDURE — 2580000003 HC RX 258: Performed by: STUDENT IN AN ORGANIZED HEALTH CARE EDUCATION/TRAINING PROGRAM

## 2024-06-22 PROCEDURE — 85027 COMPLETE CBC AUTOMATED: CPT

## 2024-06-22 RX ADMIN — METOPROLOL TARTRATE 12.5 MG: 25 TABLET, FILM COATED ORAL at 20:37

## 2024-06-22 RX ADMIN — HEPARIN SODIUM 19 UNITS/KG/HR: 10000 INJECTION, SOLUTION INTRAVENOUS at 03:12

## 2024-06-22 RX ADMIN — ATORVASTATIN CALCIUM 80 MG: 80 TABLET, FILM COATED ORAL at 20:37

## 2024-06-22 RX ADMIN — HYDROXYZINE HYDROCHLORIDE 25 MG: 25 TABLET ORAL at 23:11

## 2024-06-22 RX ADMIN — VALSARTAN 40 MG: 40 TABLET, FILM COATED ORAL at 08:28

## 2024-06-22 RX ADMIN — FUROSEMIDE 40 MG: 40 TABLET ORAL at 08:28

## 2024-06-22 RX ADMIN — SODIUM CHLORIDE, PRESERVATIVE FREE 10 ML: 5 INJECTION INTRAVENOUS at 08:30

## 2024-06-22 RX ADMIN — ASPIRIN 81 MG 81 MG: 81 TABLET ORAL at 08:28

## 2024-06-22 RX ADMIN — SODIUM CHLORIDE, PRESERVATIVE FREE 10 ML: 5 INJECTION INTRAVENOUS at 20:38

## 2024-06-22 RX ADMIN — POTASSIUM CHLORIDE 10 MEQ: 1500 TABLET, EXTENDED RELEASE ORAL at 08:28

## 2024-06-22 RX ADMIN — HEPARIN SODIUM 17 UNITS/KG/HR: 10000 INJECTION, SOLUTION INTRAVENOUS at 14:28

## 2024-06-22 ASSESSMENT — PAIN SCALES - GENERAL: PAINLEVEL_OUTOF10: 0

## 2024-06-22 NOTE — PLAN OF CARE
Problem: Safety - Adult  Goal: Free from fall injury  6/22/2024 1024 by Gudelia Hernandez RN  Outcome: Progressing  6/22/2024 0514 by Jorje Ferrer RN  Outcome: Progressing     Problem: Chronic Conditions and Co-morbidities  Goal: Patient's chronic conditions and co-morbidity symptoms are monitored and maintained or improved  6/22/2024 1024 by Gudelia Hernandez RN  Outcome: Progressing  6/22/2024 0514 by Jorje Ferrer RN  Outcome: Progressing  Flowsheets (Taken 6/21/2024 2000)  Care Plan - Patient's Chronic Conditions and Co-Morbidity Symptoms are Monitored and Maintained or Improved:   Monitor and assess patient's chronic conditions and comorbid symptoms for stability, deterioration, or improvement   Collaborate with multidisciplinary team to address chronic and comorbid conditions and prevent exacerbation or deterioration   Update acute care plan with appropriate goals if chronic or comorbid symptoms are exacerbated and prevent overall improvement and discharge     Problem: Discharge Planning  Goal: Discharge to home or other facility with appropriate resources  6/22/2024 1024 by Gudelia Hernandez RN  Outcome: Progressing  6/22/2024 0514 by Jorje Ferrer RN  Outcome: Progressing  Flowsheets (Taken 6/21/2024 2000)  Discharge to home or other facility with appropriate resources:   Identify barriers to discharge with patient and caregiver   Arrange for needed discharge resources and transportation as appropriate   Identify discharge learning needs (meds, wound care, etc)   Refer to discharge planning if patient needs post-hospital services based on physician order or complex needs related to functional status, cognitive ability or social support system     Problem: ABCDS Injury Assessment  Goal: Absence of physical injury  6/22/2024 1024 by Gudelia Hernandez RN  Outcome: Progressing  6/22/2024 0514 by Jorje Ferrer RN  Outcome: Progressing

## 2024-06-22 NOTE — PROGRESS NOTES
[facility]  Office: [phone]  Francis Wynne DO, Barry Devries, DO, Wei Downing DO, Khris Scott, DO, David Evans MD, Kenia Gillis MD, Cecil Hampton MD, Jessy Franco MD,  Leroy Hill MD, Florecita Moran MD, Dylan Yeager MD,  Rio Collazo DO, Taisha Andrew MD, Donn Parks MD, Paul Wynne DO, Olimpia Hernandez MD,  Zia Dowd DO, Bindu Danielson MD, Any Miller MD, Sherly Angeles MD, Cl Alexander MD,  Jorge Luis Bahena MD, Robin Wilson MD, Bubba Candelario MD, Dahlia Stapleton MD, Elder Rios MD, Dinesh Ledezma MD, Raj Jenkins DO, Ranjan Rashid DO, Aubrey Rodriguez MD,  Edu Vazquez MD, Shirley Waterhouse, CNP,  Chelle Perdomo CNP, Ricardo Webb, CNP,  Ellen Jacinto, DNP, Bess Tao, CNP, Saira Taveras, CNP, Francine June CNP, Kati Toussaint, CNP, Sidra García, PA-C, Farrah Jose PA-C, Ronel Hudson, CNP, Kendal Flor, CNP, Prakash Gardiner, CNP, Mayela Sweeney, CNP, Darlyn Izaguirre, CNP, Shauna Gibbons, CNS, Priyanka Maldonado, CNP, Jacquelin Bucio CNP, Tracy Schwab, CNP         [facility]   IN-PATIENT SERVICE   [facility]    Progress Note    6/22/2024    1:45 PM    Name:   Jesús Min  MRN:     [id number]     Acct:      [id number]   Room:   0540/0540-01   Day:  4  Admit Date:  6/18/2024  3:00 PM    PCP:   No primary care provider on file.  Code Status:  Full Code    Subjective:     Patient doing well today.  No chest pain.  No shortness of breath.  Has no complaints.  Edema improved       Brief History:     This is a 49-year-old male who presented to the hospital as a transfer for evaluation for CABG    Medications:     Allergies:    Allergies   Allergen Reactions    Codeine Nausea Only    Pcn [Penicillins]        Current Meds:   Scheduled Meds:    valsartan  40 mg Oral Daily    furosemide  40 mg Oral Daily    metoprolol tartrate  12.5 mg Oral BID    sodium chloride flush  5-40 mL IntraVENous 2 times per day

## 2024-06-22 NOTE — CARE COORDINATION
ECHO from yesterday shows Left Ventricle: Severely reduced left ventricular systolic function with a visually estimated EF of 15 - 20%. Will discuss with Dr. James and discuss with patient surgical and nonsurgical options with patient on morning rounds.    LAURIE Cabello

## 2024-06-22 NOTE — PLAN OF CARE
Problem: Safety - Adult  Goal: Free from fall injury  6/22/2024 0514 by Jorje Ferrer RN  Outcome: Progressing  6/21/2024 1854 by Alma Delia Lazar RN  Outcome: Progressing     Problem: Chronic Conditions and Co-morbidities  Goal: Patient's chronic conditions and co-morbidity symptoms are monitored and maintained or improved  6/22/2024 0514 by Jorje Ferrer RN  Outcome: Progressing  Flowsheets (Taken 6/21/2024 2000)  Care Plan - Patient's Chronic Conditions and Co-Morbidity Symptoms are Monitored and Maintained or Improved:   Monitor and assess patient's chronic conditions and comorbid symptoms for stability, deterioration, or improvement   Collaborate with multidisciplinary team to address chronic and comorbid conditions and prevent exacerbation or deterioration   Update acute care plan with appropriate goals if chronic or comorbid symptoms are exacerbated and prevent overall improvement and discharge  6/21/2024 1854 by Alma Delia Lazar RN  Outcome: Progressing     Problem: Discharge Planning  Goal: Discharge to home or other facility with appropriate resources  6/22/2024 0514 by Jorje Ferrer RN  Outcome: Progressing  Flowsheets (Taken 6/21/2024 2000)  Discharge to home or other facility with appropriate resources:   Identify barriers to discharge with patient and caregiver   Arrange for needed discharge resources and transportation as appropriate   Identify discharge learning needs (meds, wound care, etc)   Refer to discharge planning if patient needs post-hospital services based on physician order or complex needs related to functional status, cognitive ability or social support system  6/21/2024 1854 by Alma Delia Lazar RN  Outcome: Progressing     Problem: ABCDS Injury Assessment  Goal: Absence of physical injury  6/22/2024 0514 by Jorje Ferrer RN  Outcome: Progressing  6/21/2024 1854 by Alma Delia Lazar RN  Outcome: Progressing

## 2024-06-23 ENCOUNTER — ANESTHESIA EVENT (OUTPATIENT)
Dept: OPERATING ROOM | Age: 49
End: 2024-06-23
Payer: COMMERCIAL

## 2024-06-23 LAB
BASOPHILS # BLD: 0.11 K/UL (ref 0–0.2)
BASOPHILS NFR BLD: 1 % (ref 0–2)
EOSINOPHIL # BLD: 0.26 K/UL (ref 0–0.44)
EOSINOPHILS RELATIVE PERCENT: 2 % (ref 1–4)
ERYTHROCYTE [DISTWIDTH] IN BLOOD BY AUTOMATED COUNT: 15.4 % (ref 11.8–14.4)
HCT VFR BLD AUTO: 47.6 % (ref 40.7–50.3)
HGB BLD-MCNC: 15 G/DL (ref 13–17)
IMM GRANULOCYTES # BLD AUTO: 0.17 K/UL (ref 0–0.3)
IMM GRANULOCYTES NFR BLD: 1 %
INR PPP: 1
LYMPHOCYTES NFR BLD: 3.16 K/UL (ref 1.1–3.7)
LYMPHOCYTES RELATIVE PERCENT: 24 % (ref 24–43)
MCH RBC QN AUTO: 28.2 PG (ref 25.2–33.5)
MCHC RBC AUTO-ENTMCNC: 31.5 G/DL (ref 28.4–34.8)
MCV RBC AUTO: 89.5 FL (ref 82.6–102.9)
MONOCYTES NFR BLD: 1.04 K/UL (ref 0.1–1.2)
MONOCYTES NFR BLD: 8 % (ref 3–12)
NEUTROPHILS NFR BLD: 64 % (ref 36–65)
NEUTS SEG NFR BLD: 8.51 K/UL (ref 1.5–8.1)
NRBC BLD-RTO: 0 PER 100 WBC
PARTIAL THROMBOPLASTIN TIME: 47.5 SEC (ref 23–36.5)
PLATELET # BLD AUTO: 316 K/UL (ref 138–453)
PMV BLD AUTO: 9.6 FL (ref 8.1–13.5)
PROTHROMBIN TIME: 12.6 SEC (ref 11.7–14.9)
RBC # BLD AUTO: 5.32 M/UL (ref 4.21–5.77)
RBC # BLD: ABNORMAL 10*6/UL
WBC OTHER # BLD: 13.3 K/UL (ref 3.5–11.3)

## 2024-06-23 PROCEDURE — 36415 COLL VENOUS BLD VENIPUNCTURE: CPT

## 2024-06-23 PROCEDURE — 85730 THROMBOPLASTIN TIME PARTIAL: CPT

## 2024-06-23 PROCEDURE — 85610 PROTHROMBIN TIME: CPT

## 2024-06-23 PROCEDURE — 6370000000 HC RX 637 (ALT 250 FOR IP): Performed by: INTERNAL MEDICINE

## 2024-06-23 PROCEDURE — 99232 SBSQ HOSP IP/OBS MODERATE 35: CPT | Performed by: INTERNAL MEDICINE

## 2024-06-23 PROCEDURE — 83036 HEMOGLOBIN GLYCOSYLATED A1C: CPT

## 2024-06-23 PROCEDURE — 2580000003 HC RX 258: Performed by: STUDENT IN AN ORGANIZED HEALTH CARE EDUCATION/TRAINING PROGRAM

## 2024-06-23 PROCEDURE — 6370000000 HC RX 637 (ALT 250 FOR IP): Performed by: HOSPITALIST

## 2024-06-23 PROCEDURE — 87641 MR-STAPH DNA AMP PROBE: CPT

## 2024-06-23 PROCEDURE — 2060000000 HC ICU INTERMEDIATE R&B

## 2024-06-23 PROCEDURE — 6360000002 HC RX W HCPCS: Performed by: HOSPITALIST

## 2024-06-23 PROCEDURE — 85025 COMPLETE CBC W/AUTO DIFF WBC: CPT

## 2024-06-23 PROCEDURE — 6370000000 HC RX 637 (ALT 250 FOR IP): Performed by: PHYSICIAN ASSISTANT

## 2024-06-23 RX ORDER — SODIUM CHLORIDE 0.9 % (FLUSH) 0.9 %
5-40 SYRINGE (ML) INJECTION EVERY 12 HOURS SCHEDULED
Status: DISCONTINUED | OUTPATIENT
Start: 2024-06-23 | End: 2024-06-24

## 2024-06-23 RX ORDER — LORAZEPAM 0.5 MG/1
0.5 TABLET ORAL EVERY 4 HOURS PRN
Status: DISCONTINUED | OUTPATIENT
Start: 2024-06-23 | End: 2024-06-24

## 2024-06-23 RX ORDER — SODIUM CHLORIDE 9 MG/ML
INJECTION, SOLUTION INTRAVENOUS PRN
Status: DISCONTINUED | OUTPATIENT
Start: 2024-06-23 | End: 2024-06-24

## 2024-06-23 RX ORDER — SODIUM CHLORIDE 0.9 % (FLUSH) 0.9 %
10 SYRINGE (ML) INJECTION PRN
Status: DISCONTINUED | OUTPATIENT
Start: 2024-06-23 | End: 2024-06-24

## 2024-06-23 RX ORDER — CHLORHEXIDINE GLUCONATE 40 MG/ML
SOLUTION TOPICAL SEE ADMIN INSTRUCTIONS
Status: DISCONTINUED | OUTPATIENT
Start: 2024-06-23 | End: 2024-06-24

## 2024-06-23 RX ORDER — SODIUM CHLORIDE 9 MG/ML
INJECTION, SOLUTION INTRAVENOUS CONTINUOUS
Status: DISCONTINUED | OUTPATIENT
Start: 2024-06-24 | End: 2024-06-24

## 2024-06-23 RX ORDER — CHLORHEXIDINE GLUCONATE ORAL RINSE 1.2 MG/ML
15 SOLUTION DENTAL ONCE
Status: COMPLETED | OUTPATIENT
Start: 2024-06-23 | End: 2024-06-24

## 2024-06-23 RX ORDER — ASPIRIN 81 MG/1
81 TABLET ORAL
Status: COMPLETED | OUTPATIENT
Start: 2024-06-23 | End: 2024-06-24

## 2024-06-23 RX ADMIN — HEPARIN SODIUM 17 UNITS/KG/HR: 10000 INJECTION, SOLUTION INTRAVENOUS at 03:48

## 2024-06-23 RX ADMIN — SODIUM CHLORIDE, PRESERVATIVE FREE 10 ML: 5 INJECTION INTRAVENOUS at 21:39

## 2024-06-23 RX ADMIN — METOPROLOL TARTRATE 12.5 MG: 25 TABLET, FILM COATED ORAL at 21:38

## 2024-06-23 RX ADMIN — MUPIROCIN: 20 OINTMENT TOPICAL at 23:35

## 2024-06-23 RX ADMIN — METOPROLOL TARTRATE 12.5 MG: 25 TABLET, FILM COATED ORAL at 08:56

## 2024-06-23 RX ADMIN — ATORVASTATIN CALCIUM 80 MG: 80 TABLET, FILM COATED ORAL at 21:37

## 2024-06-23 RX ADMIN — POTASSIUM CHLORIDE 10 MEQ: 1500 TABLET, EXTENDED RELEASE ORAL at 08:59

## 2024-06-23 RX ADMIN — VALSARTAN 40 MG: 40 TABLET, FILM COATED ORAL at 09:02

## 2024-06-23 RX ADMIN — SODIUM CHLORIDE, PRESERVATIVE FREE 10 ML: 5 INJECTION INTRAVENOUS at 09:02

## 2024-06-23 RX ADMIN — ASPIRIN 81 MG 81 MG: 81 TABLET ORAL at 08:56

## 2024-06-23 RX ADMIN — FUROSEMIDE 40 MG: 40 TABLET ORAL at 08:56

## 2024-06-23 NOTE — PLAN OF CARE
Problem: Safety - Adult  Goal: Free from fall injury  6/23/2024 0006 by Jorje Ferrer RN  Outcome: Progressing  6/22/2024 1024 by Gudelia Hernandez RN  Outcome: Progressing     Problem: Chronic Conditions and Co-morbidities  Goal: Patient's chronic conditions and co-morbidity symptoms are monitored and maintained or improved  6/23/2024 0006 by Jorje Ferrer RN  Outcome: Progressing  Flowsheets (Taken 6/22/2024 2000)  Care Plan - Patient's Chronic Conditions and Co-Morbidity Symptoms are Monitored and Maintained or Improved:   Monitor and assess patient's chronic conditions and comorbid symptoms for stability, deterioration, or improvement   Collaborate with multidisciplinary team to address chronic and comorbid conditions and prevent exacerbation or deterioration   Update acute care plan with appropriate goals if chronic or comorbid symptoms are exacerbated and prevent overall improvement and discharge  6/22/2024 1024 by Gudelia Hernandez RN  Outcome: Progressing     Problem: Discharge Planning  Goal: Discharge to home or other facility with appropriate resources  6/23/2024 0006 by Jorje Ferrer RN  Outcome: Progressing  Flowsheets (Taken 6/22/2024 2000)  Discharge to home or other facility with appropriate resources:   Identify barriers to discharge with patient and caregiver   Arrange for needed discharge resources and transportation as appropriate   Identify discharge learning needs (meds, wound care, etc)   Refer to discharge planning if patient needs post-hospital services based on physician order or complex needs related to functional status, cognitive ability or social support system  6/22/2024 1024 by Gudelia Hernandez RN  Outcome: Progressing     Problem: ABCDS Injury Assessment  Goal: Absence of physical injury  6/23/2024 0006 by Jorje Ferrer RN  Outcome: Progressing  6/22/2024 1024 by Gudelia Hernandez RN  Outcome: Progressing

## 2024-06-23 NOTE — PLAN OF CARE
Problem: Safety - Adult  Goal: Free from fall injury  6/23/2024 1338 by Gudelia Hernandez RN  Outcome: Progressing  6/23/2024 0006 by Jorje Ferrer RN  Outcome: Progressing     Problem: Chronic Conditions and Co-morbidities  Goal: Patient's chronic conditions and co-morbidity symptoms are monitored and maintained or improved  6/23/2024 1338 by Gudelia Hernandez RN  Outcome: Progressing  6/23/2024 0006 by Jorje Ferrer RN  Outcome: Progressing  Flowsheets (Taken 6/22/2024 2000)  Care Plan - Patient's Chronic Conditions and Co-Morbidity Symptoms are Monitored and Maintained or Improved:   Monitor and assess patient's chronic conditions and comorbid symptoms for stability, deterioration, or improvement   Collaborate with multidisciplinary team to address chronic and comorbid conditions and prevent exacerbation or deterioration   Update acute care plan with appropriate goals if chronic or comorbid symptoms are exacerbated and prevent overall improvement and discharge     Problem: Discharge Planning  Goal: Discharge to home or other facility with appropriate resources  6/23/2024 1338 by Gudelia Hernandez RN  Outcome: Progressing  6/23/2024 0006 by Jorje Ferrer RN  Outcome: Progressing  Flowsheets (Taken 6/22/2024 2000)  Discharge to home or other facility with appropriate resources:   Identify barriers to discharge with patient and caregiver   Arrange for needed discharge resources and transportation as appropriate   Identify discharge learning needs (meds, wound care, etc)   Refer to discharge planning if patient needs post-hospital services based on physician order or complex needs related to functional status, cognitive ability or social support system     Problem: ABCDS Injury Assessment  Goal: Absence of physical injury  6/23/2024 1338 by Gudelia Hernandez RN  Outcome: Progressing  6/23/2024 0006 by Jorje Ferrer RN  Outcome: Progressing

## 2024-06-23 NOTE — PROGRESS NOTES
Cardiology Progress Note                     Date:   6/22/2024  Patient name: Jesús Min  Date of admission:  6/18/2024  3:00 PM  MRN:   [id number]  YOB: 1975  PCP: No primary care provider on file.    Reason for Admission:  NSTEMI, HFrEF    Subjective:       There were no acute events overnight, remained hemodynamically stable, denies chest pain or dyspnea, has been ambulating in hallway, in normal sinus rhythm.     Scheduled Meds:   valsartan  40 mg Oral Daily    furosemide  40 mg Oral Daily    metoprolol tartrate  12.5 mg Oral BID    sodium chloride flush  5-40 mL IntraVENous 2 times per day    atorvastatin  80 mg Oral Nightly    aspirin  81 mg Oral Daily    potassium chloride  10 mEq Oral Daily       Continuous Infusions:   sodium chloride      heparin (PORCINE) Infusion 17 Units/kg/hr (06/22/24 1428)       Labs:     CBC:   Recent Labs     06/20/24  0643 06/22/24  0713   WBC 10.3 9.9   HGB 14.0 14.4    254     BMP:    Recent Labs     06/20/24  0643 06/21/24  0457 06/22/24  0713    141 140   K 3.9 4.0 4.1    104 105   CO2 24 26 26   BUN 15 14 10   CREATININE 1.0 1.0 0.9   GLUCOSE 100* 129* 115*     Hepatic:   No results for input(s): \"AST\", \"ALT\", \"BILITOT\", \"ALKPHOS\" in the last 72 hours.    Invalid input(s): \"ALB\"    Troponin: No results for input(s): \"TROPONINI\" in the last 72 hours.  BNP: No results for input(s): \"BNP\" in the last 72 hours.  Lipids: No results for input(s): \"CHOL\", \"HDL\" in the last 72 hours.    Invalid input(s): \"LDLCALCU\"  INR:   No results for input(s): \"INR\" in the last 72 hours.        Objective:     Vitals: /66   Pulse 89   Temp 98 °F (36.7 °C) (Oral)   Resp 20   Ht 1.68 m (5' 6.14\")   Wt 113.1 kg (249 lb 5.4 oz)   SpO2 95%   BMI 40.07 kg/m²     General appearance: awake, alert, in no apparent respiratory distress on room air  HEENT: Head: Normocephalic, no lesions, without obvious abnormality  Neck: no JVD  Lungs: Improved  with global hypokinesia with akinesis of inferior wall   F/u with CTS plan for CABG.   Discussed with patient in detail.       Makenzie Benavidez MD [facility]

## 2024-06-23 NOTE — PROGRESS NOTES
[facility]  Office: [phone]  Francis Wynne DO, Barry Devries DO, Wei Downing DO, Khris Scott DO, David Evans MD, Kenia Gillis MD, Cecil Hampton MD, Jessy Franco MD,  Leroy Hill MD, Florecita Moran MD, Dylan Yeager MD,  Rio Collazo DO, Taisha Andrew MD, Donn Parks MD, Paul Wynne DO, Olimpia Hernandez MD,  Zia Dowd DO, Bindu Danielson MD, Any Miller MD, Sherly Angeles MD, Cl Alexander MD,  Jorge Luis Bahena MD, Robin Wilson MD, Bubba Candelario MD, Dahlia Stapleton MD, Elder Rios MD, Dinesh Ledezma MD, Raj Jenkins DO, Ranjan Rashid DO, Aubrey Rodriguez MD,  Edu Vazquez MD, Shirley Waterhouse, CNP,  Chelle Perdomo CNP, Ricardo Webb, CNP,  Ellen Jacinto, DNP, Bess Tao, CNP, Saira Taveras, CNP, Francine June CNP, Kati Toussaint CNP, Sdira García, PA-C, Farrah Jose PA-C, Ronel Hudson, CNP, Kendal Flor, CNP, Prakash Gardiner CNP, Mayela Sweeney, CNP, Darlyn Izaguirre, CNP, Shauna Gibbons, CNS, Priyanka Maldonado, CNP, Jacquelin Bucio CNP, Tracy Schwab, CNP         [facility]   IN-PATIENT SERVICE   [facility]    Progress Note    6/23/2024    3:29 PM    Name:   Jesús Min  MRN:     [id number]     Acct:      [id number]   Room:   0540/0540-01   Day:  5  Admit Date:  6/18/2024  3:00 PM    PCP:   No primary care provider on file.  Code Status:  Full Code    Subjective:   Family at bedside. Patient is very anxious says he's nervous for surgery tomorrow. No chest pain       Brief History:     This is a 49-year-old male who presented to the hospital as a transfer for evaluation for CABG    Medications:     Allergies:    Allergies   Allergen Reactions    Codeine Nausea Only    Pcn [Penicillins]        Current Meds:   Scheduled Meds:    valsartan  40 mg Oral Daily    furosemide  40 mg Oral Daily    metoprolol tartrate  12.5 mg Oral BID    sodium chloride flush  5-40 mL IntraVENous 2 times per day    atorvastatin  80  \"POCHCO3\", \"BCV9ADG\", \"HCO3\", \"NBEA\", \"PBEA\", \"BEART\", \"BE\", \"THGBART\", \"THB\", \"XDY2UFU\", \"LRXZ1HRN\", \"E4VDDLCV\", \"O2SAT\", \"FIO2\"  No results found for: \"SPECIAL\"  No results found for: \"CULTURE\"    Radiology:  CT CHEST PULMONARY EMBOLISM W CONTRAST    Result Date: 6/15/2024  1. No evidence of pulmonary embolism. 2. Moderate right pleural effusion and small left pleural effusion. 3. Mild bibasilar atelectasis, greater on the right 4. Cardiomegaly. 5. Multiple nonspecific lymph nodes seen in the middle mediastinum, probably reactive.  Follow-up exam recommended       Physical Examination:     General appearance:  alert, cooperative and no distress  Mental Status:  oriented to person, place and time and normal affect  Lungs:  clear to auscultation bilaterally, normal effort  Heart:  regular rate and rhythm, no murmur  Abdomen:  soft, nontender, nondistended, normal bowel sounds, no masses, hepatomegaly, splenomegaly  Extremities:  no edema, redness, tenderness in the calves  Skin:  no gross lesions, rashes, induration    Assessment:     Hospital Problems             Last Modified POA    * (Principal) New onset of congestive heart failure (HCC) 6/19/2024 Yes    NSTEMI (non-ST elevated myocardial infarction) ([facility]) 6/19/2024 Yes    CAD, multiple vessel 6/19/2024 Yes    Mild mitral regurgitation 6/19/2024 Yes    Mild tricuspid regurgitation 6/19/2024 Yes    Dyslipidemia 6/19/2024 Yes    Class 3 severe obesity due to excess calories with serious comorbidity and body mass index (BMI) of 40.0 to 44.9 in adult ([facility]) 6/19/2024 Yes    Tobacco abuse 6/19/2024 Yes    Moderate sized pleural effusion 6/19/2024 Yes    Acute on chronic systolic (congestive) heart failure (HCC) 6/19/2024 Yes    Plan:     NSTEMI recent cardiac catheterization shows multivessel coronary artery disease  Patient transferred here for CABG evaluation  Moderate right pleural effusion  New diagnosis of HFrEF due to ischemic cardiomyopathy  -8.8 L since

## 2024-06-24 ENCOUNTER — APPOINTMENT (OUTPATIENT)
Dept: GENERAL RADIOLOGY | Age: 49
End: 2024-06-24
Attending: STUDENT IN AN ORGANIZED HEALTH CARE EDUCATION/TRAINING PROGRAM
Payer: COMMERCIAL

## 2024-06-24 ENCOUNTER — ANESTHESIA (OUTPATIENT)
Dept: OPERATING ROOM | Age: 49
End: 2024-06-24
Payer: COMMERCIAL

## 2024-06-24 LAB
ALLEN TEST: ABNORMAL
ANION GAP SERPL CALCULATED.3IONS-SCNC: 12 MMOL/L (ref 9–16)
ANION GAP SERPL CALCULATED.3IONS-SCNC: 14 MMOL/L (ref 9–16)
ANION GAP SERPL CALCULATED.3IONS-SCNC: 16 MMOL/L (ref 9–16)
ANTI-XA UNFRAC HEPARIN: <0.1 IU/L
BACTERIA URNS QL MICRO: NORMAL
BASOPHILS # BLD: 0 K/UL (ref 0–0.2)
BASOPHILS NFR BLD: 0 % (ref 0–2)
BILIRUB UR QL STRIP: NEGATIVE
BUN BLD-MCNC: 10 MG/DL (ref 8–26)
BUN BLD-MCNC: 11 MG/DL (ref 8–26)
BUN SERPL-MCNC: 10 MG/DL (ref 6–20)
BUN SERPL-MCNC: 11 MG/DL (ref 6–20)
BUN SERPL-MCNC: 13 MG/DL (ref 6–20)
CA-I BLD-SCNC: 1.14 MMOL/L (ref 1.15–1.33)
CA-I BLD-SCNC: 1.16 MMOL/L (ref 1.15–1.33)
CA-I BLD-SCNC: 1.17 MMOL/L (ref 1.13–1.33)
CA-I BLD-SCNC: 1.2 MMOL/L (ref 1.15–1.33)
CA-I BLD-SCNC: 1.21 MMOL/L (ref 1.15–1.33)
CA-I BLD-SCNC: 1.25 MMOL/L (ref 1.15–1.33)
CA-I BLD-SCNC: 1.27 MMOL/L (ref 1.15–1.33)
CA-I BLD-SCNC: 1.32 MMOL/L (ref 1.13–1.33)
CA-I BLD-SCNC: 1.32 MMOL/L (ref 1.15–1.33)
CA-I BLD-SCNC: 1.38 MMOL/L (ref 1.15–1.33)
CA-I BLD-SCNC: 1.41 MMOL/L (ref 1.15–1.33)
CA-I BLD-SCNC: 1.44 MMOL/L (ref 1.15–1.33)
CA-I BLD-SCNC: 1.54 MMOL/L (ref 1.15–1.33)
CA-I BLD-SCNC: 1.82 MMOL/L (ref 1.15–1.33)
CALCIUM SERPL-MCNC: 8.3 MG/DL (ref 8.6–10.4)
CALCIUM SERPL-MCNC: 9.1 MG/DL (ref 8.6–10.4)
CALCIUM SERPL-MCNC: 9.2 MG/DL (ref 8.6–10.4)
CASTS #/AREA URNS LPF: NORMAL /LPF (ref 0–8)
CHLORIDE BLD-SCNC: 101 MMOL/L (ref 98–107)
CHLORIDE BLD-SCNC: 103 MMOL/L (ref 98–107)
CHLORIDE BLD-SCNC: 105 MMOL/L (ref 98–107)
CHLORIDE BLD-SCNC: 106 MMOL/L (ref 98–107)
CHLORIDE BLD-SCNC: 107 MMOL/L (ref 98–107)
CHLORIDE BLD-SCNC: 107 MMOL/L (ref 98–107)
CHLORIDE BLD-SCNC: 108 MMOL/L (ref 98–107)
CHLORIDE BLD-SCNC: 108 MMOL/L (ref 98–107)
CHLORIDE BLD-SCNC: 109 MMOL/L (ref 98–107)
CHLORIDE BLD-SCNC: 109 MMOL/L (ref 98–107)
CHLORIDE SERPL-SCNC: 104 MMOL/L (ref 98–107)
CHLORIDE SERPL-SCNC: 106 MMOL/L (ref 98–107)
CHLORIDE SERPL-SCNC: 109 MMOL/L (ref 98–107)
CLARITY UR: CLEAR
CLOT ANGLE.KAOLIN INDUCED BLD RES TEG: 72.6 DEG (ref 63–78)
CLOT ANGLE.KAOLIN INDUCED BLD RES TEG: 78.2 DEG (ref 63–78)
CO2 BLD CALC-SCNC: 23 MMOL/L (ref 22–30)
CO2 BLD CALC-SCNC: 23 MMOL/L (ref 22–30)
CO2 BLD CALC-SCNC: 24 MMOL/L (ref 22–30)
CO2 BLD CALC-SCNC: 25 MMOL/L (ref 22–30)
CO2 BLD CALC-SCNC: 28 MMOL/L (ref 22–30)
CO2 SERPL-SCNC: 21 MMOL/L (ref 20–31)
CO2 SERPL-SCNC: 21 MMOL/L (ref 20–31)
CO2 SERPL-SCNC: 22 MMOL/L (ref 20–31)
COLOR UR: YELLOW
CREAT SERPL-MCNC: 1 MG/DL (ref 0.7–1.2)
EGFR, POC: >90 ML/MIN/1.73M2
EKG ATRIAL RATE: 79 BPM
EKG ATRIAL RATE: 79 BPM
EKG P AXIS: 68 DEGREES
EKG P AXIS: 68 DEGREES
EKG P-R INTERVAL: 180 MS
EKG P-R INTERVAL: 180 MS
EKG Q-T INTERVAL: 444 MS
EKG Q-T INTERVAL: 444 MS
EKG QRS DURATION: 156 MS
EKG QRS DURATION: 156 MS
EKG QTC CALCULATION (BAZETT): 509 MS
EKG QTC CALCULATION (BAZETT): 509 MS
EKG R AXIS: 52 DEGREES
EKG R AXIS: 52 DEGREES
EKG T AXIS: 32 DEGREES
EKG T AXIS: 32 DEGREES
EKG VENTRICULAR RATE: 79 BPM
EKG VENTRICULAR RATE: 79 BPM
EOSINOPHIL # BLD: 0 K/UL (ref 0–0.44)
EOSINOPHILS RELATIVE PERCENT: 0 % (ref 1–4)
EPI CELLS #/AREA URNS HPF: NORMAL /HPF (ref 0–5)
ERYTHROCYTE [DISTWIDTH] IN BLOOD BY AUTOMATED COUNT: 15.2 % (ref 11.8–14.4)
ERYTHROCYTE [DISTWIDTH] IN BLOOD BY AUTOMATED COUNT: 15.3 % (ref 11.8–14.4)
ERYTHROCYTE [DISTWIDTH] IN BLOOD BY AUTOMATED COUNT: 15.6 % (ref 11.8–14.4)
EST. AVERAGE GLUCOSE BLD GHB EST-MCNC: 128 MG/DL
FIBRINOGEN PPP-MCNC: 234 MG/DL (ref 203–521)
FIBRINOGEN, FUNCTIONAL TEG: 23.4 MM (ref 15–32)
FIBRINOGEN, FUNCTIONAL TEG: 29 MM (ref 15–32)
FIO2: 100
FIO2: 100
FIO2: 70
GFR, ESTIMATED: 88 ML/MIN/1.73M2
GFR, ESTIMATED: >90 ML/MIN/1.73M2
GFR, ESTIMATED: >90 ML/MIN/1.73M2
GLUCOSE BLD-MCNC: 132 MG/DL (ref 74–100)
GLUCOSE BLD-MCNC: 144 MG/DL (ref 74–100)
GLUCOSE BLD-MCNC: 147 MG/DL (ref 75–110)
GLUCOSE BLD-MCNC: 164 MG/DL (ref 74–100)
GLUCOSE BLD-MCNC: 174 MG/DL (ref 74–100)
GLUCOSE BLD-MCNC: 174 MG/DL (ref 75–110)
GLUCOSE BLD-MCNC: 181 MG/DL (ref 74–100)
GLUCOSE BLD-MCNC: 182 MG/DL (ref 74–100)
GLUCOSE BLD-MCNC: 194 MG/DL (ref 74–100)
GLUCOSE BLD-MCNC: 198 MG/DL (ref 75–110)
GLUCOSE BLD-MCNC: 199 MG/DL (ref 74–100)
GLUCOSE BLD-MCNC: 203 MG/DL (ref 74–100)
GLUCOSE BLD-MCNC: 204 MG/DL (ref 74–100)
GLUCOSE BLD-MCNC: 216 MG/DL (ref 75–110)
GLUCOSE BLD-MCNC: 229 MG/DL (ref 75–110)
GLUCOSE BLD-MCNC: 234 MG/DL (ref 74–100)
GLUCOSE BLD-MCNC: 235 MG/DL (ref 74–100)
GLUCOSE BLD-MCNC: 252 MG/DL (ref 74–100)
GLUCOSE SERPL-MCNC: 113 MG/DL (ref 74–99)
GLUCOSE SERPL-MCNC: 185 MG/DL (ref 74–99)
GLUCOSE SERPL-MCNC: 194 MG/DL (ref 74–99)
GLUCOSE UR STRIP-MCNC: NEGATIVE MG/DL
HBA1C MFR BLD: 6.1 % (ref 4–6)
HBCO, MIXED, EXTENDED: 1.7 % (ref 0–5)
HCT VFR BLD AUTO: 31.9 % (ref 40.7–50.3)
HCT VFR BLD AUTO: 32 % (ref 41–53)
HCT VFR BLD AUTO: 33 % (ref 41–53)
HCT VFR BLD AUTO: 34 % (ref 41–53)
HCT VFR BLD AUTO: 36 % (ref 41–53)
HCT VFR BLD AUTO: 38 % (ref 41–53)
HCT VFR BLD AUTO: 39.5 % (ref 40.7–50.3)
HCT VFR BLD AUTO: 41 % (ref 41–53)
HCT VFR BLD AUTO: 45 % (ref 41–53)
HCT VFR BLD AUTO: 48.2 % (ref 40.7–50.3)
HEMOGLOBIN, MIXED, EXTENDED: 11.2 G/DL (ref 12–18)
HGB BLD-MCNC: 10.1 G/DL (ref 13–17)
HGB BLD-MCNC: 12.4 G/DL (ref 13–17)
HGB BLD-MCNC: 15 G/DL (ref 13–17)
HGB UR QL STRIP.AUTO: NEGATIVE
IMM GRANULOCYTES # BLD AUTO: 0.21 K/UL (ref 0–0.3)
IMM GRANULOCYTES NFR BLD: 1 %
INR PPP: 1.3
INR PPP: 1.3
KETONES UR STRIP-MCNC: ABNORMAL MG/DL
KINETICS TEG: 0.8 MIN (ref 0.8–2.1)
KINETICS TEG: 1.2 MIN (ref 0.8–2.1)
LEUKOCYTE ESTERASE UR QL STRIP: ABNORMAL
LYMPHOCYTES NFR BLD: 1.24 K/UL (ref 1.1–3.7)
LYMPHOCYTES RELATIVE PERCENT: 6 % (ref 24–43)
MA (MAX CLOT) TEG: 63 MM (ref 52–69)
MA (MAX CLOT) TEG: 65.1 MM (ref 52–69)
MA(MAX CLOT) RAPID TEG: 64.5 MM (ref 52–70)
MA(MAX CLOT) RAPID TEG: 67.3 MM (ref 52–70)
MAGNESIUM SERPL-MCNC: 2.1 MG/DL (ref 1.6–2.6)
MAGNESIUM SERPL-MCNC: 3 MG/DL (ref 1.6–2.6)
MCH RBC QN AUTO: 28.2 PG (ref 25.2–33.5)
MCH RBC QN AUTO: 28.3 PG (ref 25.2–33.5)
MCH RBC QN AUTO: 28.7 PG (ref 25.2–33.5)
MCHC RBC AUTO-ENTMCNC: 31.1 G/DL (ref 28.4–34.8)
MCHC RBC AUTO-ENTMCNC: 31.4 G/DL (ref 28.4–34.8)
MCHC RBC AUTO-ENTMCNC: 31.7 G/DL (ref 28.4–34.8)
MCV RBC AUTO: 90.2 FL (ref 82.6–102.9)
MCV RBC AUTO: 90.6 FL (ref 82.6–102.9)
MCV RBC AUTO: 90.6 FL (ref 82.6–102.9)
METHB, MIXED, EXTENDED: 1 % (ref 0–1.5)
MODE: ABNORMAL
MONOCYTES NFR BLD: 1.66 K/UL (ref 0.1–1.2)
MONOCYTES NFR BLD: 8 % (ref 3–12)
MORPHOLOGY: ABNORMAL
MRSA, DNA, NASAL: NEGATIVE
NEGATIVE BASE EXCESS, ART: 0.4 MMOL/L (ref 0–2)
NEGATIVE BASE EXCESS, ART: 1.1 MMOL/L (ref 0–2)
NEGATIVE BASE EXCESS, ART: 1.4 MMOL/L (ref 0–2)
NEGATIVE BASE EXCESS, ART: 1.5 MMOL/L (ref 0–2)
NEGATIVE BASE EXCESS, ART: 1.6 MMOL/L (ref 0–2)
NEGATIVE BASE EXCESS, ART: 1.7 MMOL/L (ref 0–2)
NEGATIVE BASE EXCESS, ART: 3.1 MMOL/L (ref 0–2)
NEGATIVE BASE EXCESS, ART: 3.3 MMOL/L (ref 0–2)
NEGATIVE BASE EXCESS, ART: 3.4 MMOL/L (ref 0–2)
NEGATIVE BASE EXCESS, ART: 3.8 MMOL/L (ref 0–2)
NEGATIVE BASE EXCESS, ART: 4.3 MMOL/L (ref 0–2)
NEGATIVE BASE EXCESS, ART: 4.8 MMOL/L (ref 0–2)
NEUTROPHILS NFR BLD: 85 % (ref 36–65)
NEUTS SEG NFR BLD: 17.59 K/UL (ref 1.5–8.1)
NITRITE UR QL STRIP: NEGATIVE
NRBC BLD-RTO: 0 PER 100 WBC
O2 CONTENT, MIXED, EXTENDED: 12 VOL % (ref 12–20)
O2 DELIVERY DEVICE: ABNORMAL
OXYGEN STATUS: ABNORMAL
PARTIAL THROMBOPLASTIN TIME: 25.4 SEC (ref 23–36.5)
PARTIAL THROMBOPLASTIN TIME: 34 SEC (ref 23–36.5)
PERFORMING LOCATION: ABNORMAL
PERFORMING LOCATION: ABNORMAL
PH UR STRIP: 6 [PH] (ref 5–8)
PLATELET # BLD AUTO: 168 K/UL (ref 138–453)
PLATELET # BLD AUTO: 168 K/UL (ref 138–453)
PLATELET # BLD AUTO: 198 K/UL (ref 138–453)
PLATELET # BLD AUTO: 297 K/UL (ref 138–453)
PMV BLD AUTO: 10.1 FL (ref 8.1–13.5)
PMV BLD AUTO: 10.4 FL (ref 8.1–13.5)
PMV BLD AUTO: 9.8 FL (ref 8.1–13.5)
POC ANION GAP: 10 MMOL/L (ref 7–16)
POC ANION GAP: 11 MMOL/L (ref 7–16)
POC ANION GAP: 11 MMOL/L (ref 7–16)
POC ANION GAP: 12 MMOL/L (ref 7–16)
POC ANION GAP: 12 MMOL/L (ref 7–16)
POC ANION GAP: 14 MMOL/L (ref 7–16)
POC ANION GAP: 14 MMOL/L (ref 7–16)
POC ANION GAP: 15 MMOL/L (ref 7–16)
POC ANION GAP: 16 MMOL/L (ref 7–16)
POC ANION GAP: 16 MMOL/L (ref 7–16)
POC CREATININE: 0.8 MG/DL (ref 0.51–1.19)
POC CREATININE: 0.9 MG/DL (ref 0.51–1.19)
POC CREATININE: 1 MG/DL (ref 0.51–1.19)
POC HCO3: 22.1 MMOL/L (ref 21–28)
POC HCO3: 22.3 MMOL/L (ref 21–28)
POC HCO3: 22.6 MMOL/L (ref 21–28)
POC HCO3: 22.8 MMOL/L (ref 21–28)
POC HCO3: 22.9 MMOL/L (ref 21–28)
POC HCO3: 23.1 MMOL/L (ref 21–28)
POC HCO3: 23.7 MMOL/L (ref 21–28)
POC HCO3: 24.2 MMOL/L (ref 21–28)
POC HCO3: 24.5 MMOL/L (ref 21–28)
POC HCO3: 24.7 MMOL/L (ref 21–28)
POC HCO3: 24.8 MMOL/L (ref 21–28)
POC HCO3: 27 MMOL/L (ref 21–28)
POC HCO3: 27.2 MMOL/L (ref 21–28)
POC HEMOGLOBIN (CALC): 10.8 G/DL (ref 13.5–17.5)
POC HEMOGLOBIN (CALC): 10.8 G/DL (ref 13.5–17.5)
POC HEMOGLOBIN (CALC): 10.9 G/DL (ref 13.5–17.5)
POC HEMOGLOBIN (CALC): 11 G/DL (ref 13.5–17.5)
POC HEMOGLOBIN (CALC): 11.1 G/DL (ref 13.5–17.5)
POC HEMOGLOBIN (CALC): 11.2 G/DL (ref 13.5–17.5)
POC HEMOGLOBIN (CALC): 11.3 G/DL (ref 13.5–17.5)
POC HEMOGLOBIN (CALC): 11.5 G/DL (ref 13.5–17.5)
POC HEMOGLOBIN (CALC): 12.2 G/DL (ref 13.5–17.5)
POC HEMOGLOBIN (CALC): 13 G/DL (ref 13.5–17.5)
POC HEMOGLOBIN (CALC): 14.1 G/DL (ref 13.5–17.5)
POC HEMOGLOBIN (CALC): 15.5 G/DL (ref 13.5–17.5)
POC LACTIC ACID: 3.6 MMOL/L (ref 0.56–1.39)
POC LACTIC ACID: 4.1 MMOL/L (ref 0.56–1.39)
POC LACTIC ACID: 4.9 MMOL/L (ref 0.56–1.39)
POC LACTIC ACID: 6.1 MMOL/L (ref 0.56–1.39)
POC LACTIC ACID: 6.3 MMOL/L (ref 0.56–1.39)
POC LACTIC ACID: 7 MMOL/L (ref 0.56–1.39)
POC O2 SATURATION: 100 % (ref 94–98)
POC O2 SATURATION: 92.4 % (ref 94–98)
POC O2 SATURATION: 97.1 % (ref 94–98)
POC O2 SATURATION: 97.8 % (ref 94–98)
POC O2 SATURATION: 98.1 % (ref 94–98)
POC O2 SATURATION: 98.1 % (ref 94–98)
POC O2 SATURATION: 98.5 % (ref 94–98)
POC O2 SATURATION: 98.8 % (ref 94–98)
POC O2 SATURATION: 99.6 % (ref 94–98)
POC O2 SATURATION: 99.8 % (ref 94–98)
POC PCO2: 36.8 MM HG (ref 35–48)
POC PCO2: 41.7 MM HG (ref 35–48)
POC PCO2: 42 MM HG (ref 35–48)
POC PCO2: 42.7 MM HG (ref 35–48)
POC PCO2: 44.4 MM HG (ref 35–48)
POC PCO2: 44.8 MM HG (ref 35–48)
POC PCO2: 46.8 MM HG (ref 35–48)
POC PCO2: 47 MM HG (ref 35–48)
POC PCO2: 47.3 MM HG (ref 35–48)
POC PCO2: 48.5 MM HG (ref 35–48)
POC PCO2: 49 MM HG (ref 35–48)
POC PCO2: 51.6 MM HG (ref 35–48)
POC PCO2: 62.6 MM HG (ref 35–48)
POC PH: 7.25 (ref 7.35–7.45)
POC PH: 7.25 (ref 7.35–7.45)
POC PH: 7.29 (ref 7.35–7.45)
POC PH: 7.3 (ref 7.35–7.45)
POC PH: 7.3 (ref 7.35–7.45)
POC PH: 7.32 (ref 7.35–7.45)
POC PH: 7.33 (ref 7.35–7.45)
POC PH: 7.34 (ref 7.35–7.45)
POC PH: 7.34 (ref 7.35–7.45)
POC PH: 7.35 (ref 7.35–7.45)
POC PH: 7.35 (ref 7.35–7.45)
POC PH: 7.38 (ref 7.35–7.45)
POC PH: 7.4 (ref 7.35–7.45)
POC PO2: 102.7 MM HG (ref 83–108)
POC PO2: 108 MM HG (ref 83–108)
POC PO2: 118.2 MM HG (ref 83–108)
POC PO2: 126.1 MM HG (ref 83–108)
POC PO2: 129.7 MM HG (ref 83–108)
POC PO2: 133.1 MM HG (ref 83–108)
POC PO2: 185.7 MM HG (ref 83–108)
POC PO2: 233.5 MM HG (ref 83–108)
POC PO2: 418.4 MM HG (ref 83–108)
POC PO2: 446.4 MM HG (ref 83–108)
POC PO2: 499.9 MM HG (ref 83–108)
POC PO2: 561.8 MM HG (ref 83–108)
POC PO2: 76.1 MM HG (ref 83–108)
POSITIVE BASE EXCESS, ART: 0.8 MMOL/L (ref 0–3)
POTASSIUM BLD-SCNC: 3.4 MMOL/L (ref 3.5–4.5)
POTASSIUM BLD-SCNC: 3.6 MMOL/L (ref 3.5–4.5)
POTASSIUM BLD-SCNC: 3.8 MMOL/L (ref 3.5–4.5)
POTASSIUM BLD-SCNC: 4.1 MMOL/L (ref 3.5–4.5)
POTASSIUM BLD-SCNC: 4.3 MMOL/L (ref 3.5–4.5)
POTASSIUM BLD-SCNC: 4.5 MMOL/L (ref 3.5–4.5)
POTASSIUM BLD-SCNC: 4.6 MMOL/L (ref 3.5–4.5)
POTASSIUM BLD-SCNC: 5.4 MMOL/L (ref 3.5–4.5)
POTASSIUM BLD-SCNC: 5.7 MMOL/L (ref 3.5–4.5)
POTASSIUM BLD-SCNC: 5.8 MMOL/L (ref 3.5–4.5)
POTASSIUM SERPL-SCNC: 3.5 MMOL/L (ref 3.7–5.3)
POTASSIUM SERPL-SCNC: 4.2 MMOL/L (ref 3.7–5.3)
POTASSIUM SERPL-SCNC: 4.4 MMOL/L (ref 3.7–5.3)
PROT UR STRIP-MCNC: ABNORMAL MG/DL
PROTHROMBIN TIME: 16 SEC (ref 11.7–14.9)
PROTHROMBIN TIME: 16.1 SEC (ref 11.7–14.9)
RBC # BLD AUTO: 3.52 M/UL (ref 4.21–5.77)
RBC # BLD AUTO: 4.38 M/UL (ref 4.21–5.77)
RBC # BLD AUTO: 5.32 M/UL (ref 4.21–5.77)
RBC #/AREA URNS HPF: NORMAL /HPF (ref 0–4)
REACTION TIME TEG W HEPARIN: 5.3 MIN (ref 4.3–8.3)
REACTION TIME TEG W HEPARIN: 7.3 MIN (ref 4.3–8.3)
REACTION TIME TEG: 6 MIN (ref 4.6–9.1)
REACTION TIME TEG: 9.6 MIN (ref 4.6–9.1)
SAMPLE SITE: ABNORMAL
SAO2 % BLDMV: 79.8 % (ref 60–80)
SODIUM BLD-SCNC: 138 MMOL/L (ref 138–146)
SODIUM BLD-SCNC: 138 MMOL/L (ref 138–146)
SODIUM BLD-SCNC: 139 MMOL/L (ref 138–146)
SODIUM BLD-SCNC: 141 MMOL/L (ref 138–146)
SODIUM BLD-SCNC: 142 MMOL/L (ref 138–146)
SODIUM BLD-SCNC: 142 MMOL/L (ref 138–146)
SODIUM BLD-SCNC: 143 MMOL/L (ref 138–146)
SODIUM BLD-SCNC: 143 MMOL/L (ref 138–146)
SODIUM BLD-SCNC: 144 MMOL/L (ref 138–146)
SODIUM BLD-SCNC: 146 MMOL/L (ref 138–146)
SODIUM BLD-SCNC: 147 MMOL/L (ref 138–146)
SODIUM BLD-SCNC: 147 MMOL/L (ref 138–146)
SODIUM SERPL-SCNC: 139 MMOL/L (ref 136–145)
SODIUM SERPL-SCNC: 140 MMOL/L (ref 136–145)
SODIUM SERPL-SCNC: 146 MMOL/L (ref 136–145)
SP GR UR STRIP: 1.03 (ref 1–1.03)
SPECIMEN DESCRIPTION: NORMAL
UROBILINOGEN UR STRIP-ACNC: NORMAL EU/DL (ref 0–1)
WBC #/AREA URNS HPF: NORMAL /HPF (ref 0–5)
WBC OTHER # BLD: 11.7 K/UL (ref 3.5–11.3)
WBC OTHER # BLD: 20.7 K/UL (ref 3.5–11.3)
WBC OTHER # BLD: 33.2 K/UL (ref 3.5–11.3)

## 2024-06-24 PROCEDURE — 6370000000 HC RX 637 (ALT 250 FOR IP): Performed by: NURSE PRACTITIONER

## 2024-06-24 PROCEDURE — 32551 INSERTION OF CHEST TUBE: CPT

## 2024-06-24 PROCEDURE — 85610 PROTHROMBIN TIME: CPT

## 2024-06-24 PROCEDURE — 2100000001 HC CVICU R&B

## 2024-06-24 PROCEDURE — 2720000010 HC SURG SUPPLY STERILE: Performed by: THORACIC SURGERY (CARDIOTHORACIC VASCULAR SURGERY)

## 2024-06-24 PROCEDURE — 82375 ASSAY CARBOXYHB QUANT: CPT

## 2024-06-24 PROCEDURE — 7100000000 HC PACU RECOVERY - FIRST 15 MIN

## 2024-06-24 PROCEDURE — 6360000002 HC RX W HCPCS: Performed by: THORACIC SURGERY (CARDIOTHORACIC VASCULAR SURGERY)

## 2024-06-24 PROCEDURE — 85384 FIBRINOGEN ACTIVITY: CPT

## 2024-06-24 PROCEDURE — 83050 HGB METHEMOGLOBIN QUAN: CPT

## 2024-06-24 PROCEDURE — 2500000003 HC RX 250 WO HCPCS: Performed by: NURSE PRACTITIONER

## 2024-06-24 PROCEDURE — 0210099 BYPASS CORONARY ARTERY, ONE ARTERY FROM LEFT INTERNAL MAMMARY WITH AUTOLOGOUS VENOUS TISSUE, OPEN APPROACH: ICD-10-PCS | Performed by: THORACIC SURGERY (CARDIOTHORACIC VASCULAR SURGERY)

## 2024-06-24 PROCEDURE — 82565 ASSAY OF CREATININE: CPT

## 2024-06-24 PROCEDURE — 99232 SBSQ HOSP IP/OBS MODERATE 35: CPT | Performed by: INTERNAL MEDICINE

## 2024-06-24 PROCEDURE — 94002 VENT MGMT INPAT INIT DAY: CPT

## 2024-06-24 PROCEDURE — 36620 INSERTION CATHETER ARTERY: CPT

## 2024-06-24 PROCEDURE — 021109W BYPASS CORONARY ARTERY, TWO ARTERIES FROM AORTA WITH AUTOLOGOUS VENOUS TISSUE, OPEN APPROACH: ICD-10-PCS | Performed by: THORACIC SURGERY (CARDIOTHORACIC VASCULAR SURGERY)

## 2024-06-24 PROCEDURE — 85520 HEPARIN ASSAY: CPT

## 2024-06-24 PROCEDURE — 86900 BLOOD TYPING SEROLOGIC ABO: CPT

## 2024-06-24 PROCEDURE — 3600000018 HC SURGERY OHS ADDTL 15MIN: Performed by: THORACIC SURGERY (CARDIOTHORACIC VASCULAR SURGERY)

## 2024-06-24 PROCEDURE — 80051 ELECTROLYTE PANEL: CPT

## 2024-06-24 PROCEDURE — 36556 INSERT NON-TUNNEL CV CATH: CPT

## 2024-06-24 PROCEDURE — 85027 COMPLETE CBC AUTOMATED: CPT

## 2024-06-24 PROCEDURE — 6370000000 HC RX 637 (ALT 250 FOR IP): Performed by: PHYSICIAN ASSISTANT

## 2024-06-24 PROCEDURE — 3700000000 HC ANESTHESIA ATTENDED CARE: Performed by: THORACIC SURGERY (CARDIOTHORACIC VASCULAR SURGERY)

## 2024-06-24 PROCEDURE — 93503 INSERT/PLACE HEART CATHETER: CPT

## 2024-06-24 PROCEDURE — 82330 ASSAY OF CALCIUM: CPT

## 2024-06-24 PROCEDURE — A4217 STERILE WATER/SALINE, 500 ML: HCPCS | Performed by: THORACIC SURGERY (CARDIOTHORACIC VASCULAR SURGERY)

## 2024-06-24 PROCEDURE — 6360000002 HC RX W HCPCS: Performed by: SPECIALIST

## 2024-06-24 PROCEDURE — 86901 BLOOD TYPING SEROLOGIC RH(D): CPT

## 2024-06-24 PROCEDURE — 82435 ASSAY OF BLOOD CHLORIDE: CPT

## 2024-06-24 PROCEDURE — B24BZZ4 ULTRASONOGRAPHY OF HEART WITH AORTA, TRANSESOPHAGEAL: ICD-10-PCS | Performed by: THORACIC SURGERY (CARDIOTHORACIC VASCULAR SURGERY)

## 2024-06-24 PROCEDURE — 82803 BLOOD GASES ANY COMBINATION: CPT

## 2024-06-24 PROCEDURE — 6370000000 HC RX 637 (ALT 250 FOR IP): Performed by: SPECIALIST

## 2024-06-24 PROCEDURE — 7100000001 HC PACU RECOVERY - ADDTL 15 MIN

## 2024-06-24 PROCEDURE — 36415 COLL VENOUS BLD VENIPUNCTURE: CPT

## 2024-06-24 PROCEDURE — 06BQ4ZZ EXCISION OF LEFT SAPHENOUS VEIN, PERCUTANEOUS ENDOSCOPIC APPROACH: ICD-10-PCS | Performed by: THORACIC SURGERY (CARDIOTHORACIC VASCULAR SURGERY)

## 2024-06-24 PROCEDURE — 86850 RBC ANTIBODY SCREEN: CPT

## 2024-06-24 PROCEDURE — 93005 ELECTROCARDIOGRAM TRACING: CPT | Performed by: STUDENT IN AN ORGANIZED HEALTH CARE EDUCATION/TRAINING PROGRAM

## 2024-06-24 PROCEDURE — 2500000003 HC RX 250 WO HCPCS: Performed by: SPECIALIST

## 2024-06-24 PROCEDURE — 85014 HEMATOCRIT: CPT

## 2024-06-24 PROCEDURE — P9041 ALBUMIN (HUMAN),5%, 50ML: HCPCS | Performed by: NURSE PRACTITIONER

## 2024-06-24 PROCEDURE — 81001 URINALYSIS AUTO W/SCOPE: CPT

## 2024-06-24 PROCEDURE — 2709999900 HC NON-CHARGEABLE SUPPLY: Performed by: THORACIC SURGERY (CARDIOTHORACIC VASCULAR SURGERY)

## 2024-06-24 PROCEDURE — P9045 ALBUMIN (HUMAN), 5%, 250 ML: HCPCS | Performed by: SPECIALIST

## 2024-06-24 PROCEDURE — 85049 AUTOMATED PLATELET COUNT: CPT

## 2024-06-24 PROCEDURE — 83735 ASSAY OF MAGNESIUM: CPT

## 2024-06-24 PROCEDURE — 71045 X-RAY EXAM CHEST 1 VIEW: CPT

## 2024-06-24 PROCEDURE — 5A1221Z PERFORMANCE OF CARDIAC OUTPUT, CONTINUOUS: ICD-10-PCS | Performed by: THORACIC SURGERY (CARDIOTHORACIC VASCULAR SURGERY)

## 2024-06-24 PROCEDURE — 84520 ASSAY OF UREA NITROGEN: CPT

## 2024-06-24 PROCEDURE — 85347 COAGULATION TIME ACTIVATED: CPT

## 2024-06-24 PROCEDURE — 84295 ASSAY OF SERUM SODIUM: CPT

## 2024-06-24 PROCEDURE — 6370000000 HC RX 637 (ALT 250 FOR IP): Performed by: STUDENT IN AN ORGANIZED HEALTH CARE EDUCATION/TRAINING PROGRAM

## 2024-06-24 PROCEDURE — C1889 IMPLANT/INSERT DEVICE, NOC: HCPCS | Performed by: THORACIC SURGERY (CARDIOTHORACIC VASCULAR SURGERY)

## 2024-06-24 PROCEDURE — 6360000002 HC RX W HCPCS: Performed by: NURSE PRACTITIONER

## 2024-06-24 PROCEDURE — 86920 COMPATIBILITY TEST SPIN: CPT

## 2024-06-24 PROCEDURE — 3700000001 HC ADD 15 MINUTES (ANESTHESIA): Performed by: THORACIC SURGERY (CARDIOTHORACIC VASCULAR SURGERY)

## 2024-06-24 PROCEDURE — 85730 THROMBOPLASTIN TIME PARTIAL: CPT

## 2024-06-24 PROCEDURE — 84132 ASSAY OF SERUM POTASSIUM: CPT

## 2024-06-24 PROCEDURE — 80048 BASIC METABOLIC PNL TOTAL CA: CPT

## 2024-06-24 PROCEDURE — C9113 INJ PANTOPRAZOLE SODIUM, VIA: HCPCS | Performed by: NURSE PRACTITIONER

## 2024-06-24 PROCEDURE — 2580000003 HC RX 258: Performed by: THORACIC SURGERY (CARDIOTHORACIC VASCULAR SURGERY)

## 2024-06-24 PROCEDURE — 37799 UNLISTED PX VASCULAR SURGERY: CPT

## 2024-06-24 PROCEDURE — 82947 ASSAY GLUCOSE BLOOD QUANT: CPT

## 2024-06-24 PROCEDURE — 2700000000 HC OXYGEN THERAPY PER DAY

## 2024-06-24 PROCEDURE — 94761 N-INVAS EAR/PLS OXIMETRY MLT: CPT

## 2024-06-24 PROCEDURE — 82805 BLOOD GASES W/O2 SATURATION: CPT

## 2024-06-24 PROCEDURE — 2580000003 HC RX 258: Performed by: PHYSICIAN ASSISTANT

## 2024-06-24 PROCEDURE — 83605 ASSAY OF LACTIC ACID: CPT

## 2024-06-24 PROCEDURE — 85025 COMPLETE CBC W/AUTO DIFF WBC: CPT

## 2024-06-24 PROCEDURE — 2580000003 HC RX 258: Performed by: SPECIALIST

## 2024-06-24 PROCEDURE — 85576 BLOOD PLATELET AGGREGATION: CPT

## 2024-06-24 PROCEDURE — 2580000003 HC RX 258: Performed by: NURSE PRACTITIONER

## 2024-06-24 PROCEDURE — 3600000008 HC SURGERY OHS BASE: Performed by: THORACIC SURGERY (CARDIOTHORACIC VASCULAR SURGERY)

## 2024-06-24 DEVICE — HORIZON TI SMALL RED  24 CLIPS/POUCH
Type: IMPLANTABLE DEVICE | Site: CHEST  WALL | Status: FUNCTIONAL
Brand: WECK

## 2024-06-24 DEVICE — HORIZON TI MED 6/CART
Type: IMPLANTABLE DEVICE | Site: CHEST  WALL | Status: FUNCTIONAL
Brand: WECK

## 2024-06-24 RX ORDER — DEXMEDETOMIDINE HYDROCHLORIDE 4 UG/ML
.1-1.5 INJECTION, SOLUTION INTRAVENOUS CONTINUOUS
Status: DISCONTINUED | OUTPATIENT
Start: 2024-06-24 | End: 2024-06-26

## 2024-06-24 RX ORDER — SODIUM CHLORIDE, SODIUM LACTATE, POTASSIUM CHLORIDE, CALCIUM CHLORIDE 600; 310; 30; 20 MG/100ML; MG/100ML; MG/100ML; MG/100ML
INJECTION, SOLUTION INTRAVENOUS CONTINUOUS PRN
Status: DISCONTINUED | OUTPATIENT
Start: 2024-06-24 | End: 2024-06-24 | Stop reason: SDUPTHER

## 2024-06-24 RX ORDER — ONDANSETRON 2 MG/ML
4 INJECTION INTRAMUSCULAR; INTRAVENOUS EVERY 6 HOURS PRN
Status: DISCONTINUED | OUTPATIENT
Start: 2024-06-24 | End: 2024-07-01 | Stop reason: HOSPADM

## 2024-06-24 RX ORDER — DEXMEDETOMIDINE HYDROCHLORIDE 4 UG/ML
INJECTION, SOLUTION INTRAVENOUS CONTINUOUS PRN
Status: DISCONTINUED | OUTPATIENT
Start: 2024-06-24 | End: 2024-06-24 | Stop reason: SDUPTHER

## 2024-06-24 RX ORDER — HYDRALAZINE HYDROCHLORIDE 20 MG/ML
5 INJECTION INTRAMUSCULAR; INTRAVENOUS EVERY 5 MIN PRN
Status: DISCONTINUED | OUTPATIENT
Start: 2024-06-24 | End: 2024-07-01 | Stop reason: HOSPADM

## 2024-06-24 RX ORDER — SODIUM CHLORIDE 9 MG/ML
INJECTION, SOLUTION INTRAVENOUS PRN
Status: DISCONTINUED | OUTPATIENT
Start: 2024-06-24 | End: 2024-07-01 | Stop reason: HOSPADM

## 2024-06-24 RX ORDER — DEXMEDETOMIDINE HYDROCHLORIDE 4 UG/ML
INJECTION, SOLUTION INTRAVENOUS
Status: COMPLETED
Start: 2024-06-24 | End: 2024-06-24

## 2024-06-24 RX ORDER — METOPROLOL TARTRATE 1 MG/ML
2.5 INJECTION, SOLUTION INTRAVENOUS EVERY 10 MIN PRN
Status: DISCONTINUED | OUTPATIENT
Start: 2024-06-24 | End: 2024-07-01 | Stop reason: HOSPADM

## 2024-06-24 RX ORDER — PAPAVERINE HYDROCHLORIDE 30 MG/ML
INJECTION INTRAMUSCULAR; INTRAVENOUS
Status: DISCONTINUED
Start: 2024-06-24 | End: 2024-06-24

## 2024-06-24 RX ORDER — PROPOFOL 10 MG/ML
INJECTION, EMULSION INTRAVENOUS PRN
Status: DISCONTINUED | OUTPATIENT
Start: 2024-06-24 | End: 2024-06-24 | Stop reason: SDUPTHER

## 2024-06-24 RX ORDER — NOREPINEPHRINE BITARTRATE 0.06 MG/ML
.01-.08 INJECTION, SOLUTION INTRAVENOUS CONTINUOUS PRN
Status: DISCONTINUED | OUTPATIENT
Start: 2024-06-24 | End: 2024-07-01 | Stop reason: HOSPADM

## 2024-06-24 RX ORDER — DEXTROSE MONOHYDRATE 100 MG/ML
INJECTION, SOLUTION INTRAVENOUS CONTINUOUS PRN
Status: DISCONTINUED | OUTPATIENT
Start: 2024-06-24 | End: 2024-07-01 | Stop reason: HOSPADM

## 2024-06-24 RX ORDER — VANCOMYCIN HYDROCHLORIDE 1 G/20ML
INJECTION, POWDER, LYOPHILIZED, FOR SOLUTION INTRAVENOUS
Status: DISPENSED
Start: 2024-06-24 | End: 2024-06-24

## 2024-06-24 RX ORDER — ROCURONIUM BROMIDE 10 MG/ML
INJECTION, SOLUTION INTRAVENOUS PRN
Status: DISCONTINUED | OUTPATIENT
Start: 2024-06-24 | End: 2024-06-24 | Stop reason: SDUPTHER

## 2024-06-24 RX ORDER — SODIUM CHLORIDE 9 MG/ML
INJECTION, SOLUTION INTRAVENOUS PRN
Status: COMPLETED | OUTPATIENT
Start: 2024-06-24 | End: 2024-06-24

## 2024-06-24 RX ORDER — NITROGLYCERIN 20 MG/100ML
INJECTION INTRAVENOUS PRN
Status: DISCONTINUED | OUTPATIENT
Start: 2024-06-24 | End: 2024-06-24 | Stop reason: SDUPTHER

## 2024-06-24 RX ORDER — ALBUMIN, HUMAN INJ 5% 5 %
SOLUTION INTRAVENOUS
Status: DISCONTINUED
Start: 2024-06-24 | End: 2024-06-24

## 2024-06-24 RX ORDER — OXYCODONE HYDROCHLORIDE 5 MG/1
5 TABLET ORAL EVERY 4 HOURS PRN
Status: DISCONTINUED | OUTPATIENT
Start: 2024-06-24 | End: 2024-07-01 | Stop reason: HOSPADM

## 2024-06-24 RX ORDER — CLOPIDOGREL BISULFATE 75 MG/1
75 TABLET ORAL DAILY
Status: DISCONTINUED | OUTPATIENT
Start: 2024-06-25 | End: 2024-07-01 | Stop reason: HOSPADM

## 2024-06-24 RX ORDER — SODIUM CHLORIDE 0.9 % (FLUSH) 0.9 %
5-40 SYRINGE (ML) INJECTION PRN
Status: DISCONTINUED | OUTPATIENT
Start: 2024-06-24 | End: 2024-07-01 | Stop reason: HOSPADM

## 2024-06-24 RX ORDER — ALBUMIN (HUMAN) 12.5 G/50ML
25 SOLUTION INTRAVENOUS PRN
Status: DISCONTINUED | OUTPATIENT
Start: 2024-06-24 | End: 2024-07-01 | Stop reason: HOSPADM

## 2024-06-24 RX ORDER — INSULIN LISPRO 100 [IU]/ML
0-12 INJECTION, SOLUTION INTRAVENOUS; SUBCUTANEOUS
Status: DISCONTINUED | OUTPATIENT
Start: 2024-06-25 | End: 2024-07-01 | Stop reason: HOSPADM

## 2024-06-24 RX ORDER — CEFAZOLIN SODIUM 1 G/3ML
INJECTION, POWDER, FOR SOLUTION INTRAMUSCULAR; INTRAVENOUS PRN
Status: DISCONTINUED | OUTPATIENT
Start: 2024-06-24 | End: 2024-06-24 | Stop reason: SDUPTHER

## 2024-06-24 RX ORDER — MIDAZOLAM HYDROCHLORIDE 1 MG/ML
INJECTION INTRAMUSCULAR; INTRAVENOUS PRN
Status: DISCONTINUED | OUTPATIENT
Start: 2024-06-24 | End: 2024-06-24 | Stop reason: SDUPTHER

## 2024-06-24 RX ORDER — ETOMIDATE 2 MG/ML
INJECTION INTRAVENOUS
Status: COMPLETED
Start: 2024-06-24 | End: 2024-06-24

## 2024-06-24 RX ORDER — FENTANYL CITRATE 50 UG/ML
50 INJECTION, SOLUTION INTRAMUSCULAR; INTRAVENOUS
Status: DISCONTINUED | OUTPATIENT
Start: 2024-06-24 | End: 2024-07-01 | Stop reason: HOSPADM

## 2024-06-24 RX ORDER — ATORVASTATIN CALCIUM 20 MG/1
20 TABLET, FILM COATED ORAL NIGHTLY
Status: DISCONTINUED | OUTPATIENT
Start: 2024-06-25 | End: 2024-07-01 | Stop reason: HOSPADM

## 2024-06-24 RX ORDER — PANTOPRAZOLE SODIUM 40 MG/1
40 TABLET, DELAYED RELEASE ORAL DAILY
Status: DISCONTINUED | OUTPATIENT
Start: 2024-06-24 | End: 2024-07-01 | Stop reason: HOSPADM

## 2024-06-24 RX ORDER — EPHEDRINE SULFATE/0.9% NACL/PF 25 MG/5 ML
SYRINGE (ML) INTRAVENOUS PRN
Status: DISCONTINUED | OUTPATIENT
Start: 2024-06-24 | End: 2024-06-24 | Stop reason: SDUPTHER

## 2024-06-24 RX ORDER — OXYCODONE HYDROCHLORIDE 5 MG/1
10 TABLET ORAL EVERY 4 HOURS PRN
Status: DISCONTINUED | OUTPATIENT
Start: 2024-06-24 | End: 2024-07-01 | Stop reason: HOSPADM

## 2024-06-24 RX ORDER — POTASSIUM CHLORIDE 29.8 MG/ML
20 INJECTION INTRAVENOUS PRN
Status: DISCONTINUED | OUTPATIENT
Start: 2024-06-24 | End: 2024-07-01 | Stop reason: HOSPADM

## 2024-06-24 RX ORDER — HEPARIN SODIUM 1000 [USP'U]/ML
INJECTION, SOLUTION INTRAVENOUS; SUBCUTANEOUS PRN
Status: DISCONTINUED | OUTPATIENT
Start: 2024-06-24 | End: 2024-06-24 | Stop reason: SDUPTHER

## 2024-06-24 RX ORDER — ALBUMIN, HUMAN INJ 5% 5 %
SOLUTION INTRAVENOUS PRN
Status: DISCONTINUED | OUTPATIENT
Start: 2024-06-24 | End: 2024-06-24 | Stop reason: SDUPTHER

## 2024-06-24 RX ORDER — ALBUTEROL SULFATE 90 UG/1
AEROSOL, METERED RESPIRATORY (INHALATION)
Status: COMPLETED
Start: 2024-06-24 | End: 2024-06-24

## 2024-06-24 RX ORDER — LIDOCAINE HYDROCHLORIDE 10 MG/ML
INJECTION, SOLUTION EPIDURAL; INFILTRATION; INTRACAUDAL; PERINEURAL PRN
Status: DISCONTINUED | OUTPATIENT
Start: 2024-06-24 | End: 2024-06-24 | Stop reason: SDUPTHER

## 2024-06-24 RX ORDER — DEXTROSE MONOHYDRATE 25 G/50ML
INJECTION, SOLUTION INTRAVENOUS
Status: COMPLETED
Start: 2024-06-24 | End: 2024-06-24

## 2024-06-24 RX ORDER — MEPERIDINE HYDROCHLORIDE 50 MG/ML
25 INJECTION INTRAMUSCULAR; INTRAVENOUS; SUBCUTANEOUS
Status: ACTIVE | OUTPATIENT
Start: 2024-06-24 | End: 2024-06-25

## 2024-06-24 RX ORDER — ALBUMIN, HUMAN INJ 5% 5 %
25 SOLUTION INTRAVENOUS PRN
Status: DISCONTINUED | OUTPATIENT
Start: 2024-06-24 | End: 2024-07-01 | Stop reason: HOSPADM

## 2024-06-24 RX ORDER — MAGNESIUM SULFATE IN WATER 40 MG/ML
2000 INJECTION, SOLUTION INTRAVENOUS PRN
Status: DISCONTINUED | OUTPATIENT
Start: 2024-06-24 | End: 2024-07-01 | Stop reason: HOSPADM

## 2024-06-24 RX ORDER — ETOMIDATE 2 MG/ML
INJECTION INTRAVENOUS PRN
Status: DISCONTINUED | OUTPATIENT
Start: 2024-06-24 | End: 2024-06-24 | Stop reason: SDUPTHER

## 2024-06-24 RX ORDER — WATER 10 ML/10ML
INJECTION INTRAMUSCULAR; INTRAVENOUS; SUBCUTANEOUS
Status: DISCONTINUED
Start: 2024-06-24 | End: 2024-06-24

## 2024-06-24 RX ORDER — FENTANYL CITRATE 0.05 MG/ML
INJECTION, SOLUTION INTRAMUSCULAR; INTRAVENOUS PRN
Status: DISCONTINUED | OUTPATIENT
Start: 2024-06-24 | End: 2024-06-24 | Stop reason: SDUPTHER

## 2024-06-24 RX ORDER — PROTAMINE SULFATE 10 MG/ML
INJECTION, SOLUTION INTRAVENOUS
Status: COMPLETED
Start: 2024-06-24 | End: 2024-06-24

## 2024-06-24 RX ORDER — DIPHENHYDRAMINE HCL 25 MG
25 TABLET ORAL NIGHTLY PRN
Status: DISCONTINUED | OUTPATIENT
Start: 2024-06-25 | End: 2024-07-01 | Stop reason: HOSPADM

## 2024-06-24 RX ORDER — PROPOFOL 10 MG/ML
INJECTION, EMULSION INTRAVENOUS
Status: DISCONTINUED
Start: 2024-06-24 | End: 2024-06-24

## 2024-06-24 RX ORDER — MAGNESIUM HYDROXIDE 1200 MG/15ML
LIQUID ORAL CONTINUOUS PRN
Status: DISCONTINUED | OUTPATIENT
Start: 2024-06-24 | End: 2024-06-24

## 2024-06-24 RX ORDER — SENNA AND DOCUSATE SODIUM 50; 8.6 MG/1; MG/1
1 TABLET, FILM COATED ORAL 2 TIMES DAILY
Status: DISCONTINUED | OUTPATIENT
Start: 2024-06-24 | End: 2024-07-01 | Stop reason: HOSPADM

## 2024-06-24 RX ORDER — PROPOFOL 10 MG/ML
10 INJECTION, EMULSION INTRAVENOUS CONTINUOUS
Status: DISCONTINUED | OUTPATIENT
Start: 2024-06-24 | End: 2024-06-26

## 2024-06-24 RX ORDER — ALBUTEROL SULFATE 90 UG/1
AEROSOL, METERED RESPIRATORY (INHALATION) PRN
Status: DISCONTINUED | OUTPATIENT
Start: 2024-06-24 | End: 2024-06-24 | Stop reason: SDUPTHER

## 2024-06-24 RX ORDER — DEXTROSE MONOHYDRATE 25 G/50ML
INJECTION, SOLUTION INTRAVENOUS PRN
Status: DISCONTINUED | OUTPATIENT
Start: 2024-06-24 | End: 2024-06-24 | Stop reason: SDUPTHER

## 2024-06-24 RX ORDER — POLYETHYLENE GLYCOL 3350 17 G/17G
17 POWDER, FOR SOLUTION ORAL DAILY
Status: DISCONTINUED | OUTPATIENT
Start: 2024-06-24 | End: 2024-07-01 | Stop reason: HOSPADM

## 2024-06-24 RX ORDER — ASPIRIN 81 MG/1
81 TABLET ORAL DAILY
Status: DISCONTINUED | OUTPATIENT
Start: 2024-06-24 | End: 2024-06-25

## 2024-06-24 RX ORDER — MILRINONE LACTATE 0.2 MG/ML
0.15 INJECTION, SOLUTION INTRAVENOUS CONTINUOUS
Status: DISCONTINUED | OUTPATIENT
Start: 2024-06-24 | End: 2024-06-26

## 2024-06-24 RX ORDER — FENTANYL CITRATE 50 UG/ML
25 INJECTION, SOLUTION INTRAMUSCULAR; INTRAVENOUS
Status: DISCONTINUED | OUTPATIENT
Start: 2024-06-24 | End: 2024-07-01 | Stop reason: HOSPADM

## 2024-06-24 RX ORDER — SODIUM CHLORIDE 0.9 % (FLUSH) 0.9 %
5-40 SYRINGE (ML) INJECTION EVERY 12 HOURS SCHEDULED
Status: DISCONTINUED | OUTPATIENT
Start: 2024-06-24 | End: 2024-07-01 | Stop reason: HOSPADM

## 2024-06-24 RX ORDER — PROTAMINE SULFATE 10 MG/ML
INJECTION, SOLUTION INTRAVENOUS PRN
Status: DISCONTINUED | OUTPATIENT
Start: 2024-06-24 | End: 2024-06-24 | Stop reason: SDUPTHER

## 2024-06-24 RX ORDER — VANCOMYCIN HYDROCHLORIDE 1 G/20ML
INJECTION, POWDER, LYOPHILIZED, FOR SOLUTION INTRAVENOUS PRN
Status: DISCONTINUED | OUTPATIENT
Start: 2024-06-24 | End: 2024-06-24 | Stop reason: SDUPTHER

## 2024-06-24 RX ORDER — ONDANSETRON 4 MG/1
4 TABLET, ORALLY DISINTEGRATING ORAL EVERY 8 HOURS PRN
Status: DISCONTINUED | OUTPATIENT
Start: 2024-06-24 | End: 2024-07-01 | Stop reason: HOSPADM

## 2024-06-24 RX ORDER — ACETAMINOPHEN 325 MG/1
650 TABLET ORAL EVERY 6 HOURS PRN
Status: DISCONTINUED | OUTPATIENT
Start: 2024-06-24 | End: 2024-07-01 | Stop reason: HOSPADM

## 2024-06-24 RX ORDER — GLUCAGON 1 MG/ML
1 KIT INJECTION PRN
Status: DISCONTINUED | OUTPATIENT
Start: 2024-06-24 | End: 2024-07-01 | Stop reason: HOSPADM

## 2024-06-24 RX ORDER — VANCOMYCIN HYDROCHLORIDE 1 G/20ML
INJECTION, POWDER, LYOPHILIZED, FOR SOLUTION INTRAVENOUS PRN
Status: DISCONTINUED | OUTPATIENT
Start: 2024-06-24 | End: 2024-06-24

## 2024-06-24 RX ORDER — PHENYLEPHRINE HCL IN 0.9% NACL 1 MG/10 ML
SYRINGE (ML) INTRAVENOUS PRN
Status: DISCONTINUED | OUTPATIENT
Start: 2024-06-24 | End: 2024-06-24 | Stop reason: SDUPTHER

## 2024-06-24 RX ORDER — CALCIUM CHLORIDE 100 MG/ML
INJECTION INTRAVENOUS; INTRAVENTRICULAR PRN
Status: DISCONTINUED | OUTPATIENT
Start: 2024-06-24 | End: 2024-06-24 | Stop reason: SDUPTHER

## 2024-06-24 RX ORDER — MILRINONE LACTATE 0.2 MG/ML
INJECTION, SOLUTION INTRAVENOUS CONTINUOUS PRN
Status: DISCONTINUED | OUTPATIENT
Start: 2024-06-24 | End: 2024-06-24 | Stop reason: SDUPTHER

## 2024-06-24 RX ORDER — BISACODYL 10 MG
10 SUPPOSITORY, RECTAL RECTAL DAILY PRN
Status: DISCONTINUED | OUTPATIENT
Start: 2024-06-24 | End: 2024-07-01 | Stop reason: HOSPADM

## 2024-06-24 RX ORDER — AMIODARONE HYDROCHLORIDE 200 MG/1
200 TABLET ORAL 3 TIMES DAILY
Status: DISCONTINUED | OUTPATIENT
Start: 2024-06-24 | End: 2024-07-01

## 2024-06-24 RX ADMIN — ROCURONIUM BROMIDE 50 MG: 10 INJECTION, SOLUTION INTRAVENOUS at 13:10

## 2024-06-24 RX ADMIN — FENTANYL CITRATE 50 MCG: 50 INJECTION INTRAVENOUS at 09:32

## 2024-06-24 RX ADMIN — FENTANYL CITRATE 100 MCG: 50 INJECTION INTRAVENOUS at 09:38

## 2024-06-24 RX ADMIN — FENTANYL CITRATE 250 MCG: 50 INJECTION INTRAVENOUS at 08:52

## 2024-06-24 RX ADMIN — Medication 1 MG/MIN: at 19:32

## 2024-06-24 RX ADMIN — PROTAMINE SULFATE 400 MG: 10 INJECTION, SOLUTION INTRAVENOUS at 12:49

## 2024-06-24 RX ADMIN — DEXTROSE MONOHYDRATE 12.5 G: 25 INJECTION, SOLUTION INTRAVENOUS at 11:32

## 2024-06-24 RX ADMIN — NITROGLYCERIN 5 MCG: 20 INJECTION INTRAVENOUS at 12:52

## 2024-06-24 RX ADMIN — Medication 100 MCG: at 09:10

## 2024-06-24 RX ADMIN — VASOPRESSIN 0.05 UNITS/MIN: 20 INJECTION, SOLUTION INTRAVENOUS at 12:10

## 2024-06-24 RX ADMIN — ROCURONIUM BROMIDE 50 MG: 10 INJECTION, SOLUTION INTRAVENOUS at 08:52

## 2024-06-24 RX ADMIN — NITROGLYCERIN 2.5 MCG: 20 INJECTION INTRAVENOUS at 12:54

## 2024-06-24 RX ADMIN — ALBUMIN (HUMAN) 25 G: 12.5 INJECTION, SOLUTION INTRAVENOUS at 14:40

## 2024-06-24 RX ADMIN — INSULIN HUMAN 10 UNITS: 100 INJECTION, SOLUTION PARENTERAL at 12:23

## 2024-06-24 RX ADMIN — DEXTROSE MONOHYDRATE 7.5 G: 25 INJECTION, SOLUTION INTRAVENOUS at 12:24

## 2024-06-24 RX ADMIN — MILRINONE LACTATE IN DEXTROSE 0.38 MCG/KG/MIN: 200 INJECTION, SOLUTION INTRAVENOUS at 12:10

## 2024-06-24 RX ADMIN — POTASSIUM CHLORIDE 20 MEQ: 29.8 INJECTION, SOLUTION INTRAVENOUS at 22:37

## 2024-06-24 RX ADMIN — Medication 1 MG/MIN: at 12:29

## 2024-06-24 RX ADMIN — Medication 100 MCG: at 09:01

## 2024-06-24 RX ADMIN — FENTANYL CITRATE 150 MCG: 50 INJECTION INTRAVENOUS at 10:43

## 2024-06-24 RX ADMIN — DEXTROSE MONOHYDRATE 5 G: 25 INJECTION, SOLUTION INTRAVENOUS at 12:23

## 2024-06-24 RX ADMIN — LIDOCAINE HYDROCHLORIDE 50 MG: 10 INJECTION, SOLUTION EPIDURAL; INFILTRATION; INTRACAUDAL; PERINEURAL at 08:52

## 2024-06-24 RX ADMIN — FENTANYL CITRATE 100 MCG: 50 INJECTION INTRAVENOUS at 10:22

## 2024-06-24 RX ADMIN — ROCURONIUM BROMIDE 20 MG: 10 INJECTION, SOLUTION INTRAVENOUS at 12:15

## 2024-06-24 RX ADMIN — HYDROXYZINE HYDROCHLORIDE 25 MG: 25 TABLET ORAL at 00:07

## 2024-06-24 RX ADMIN — Medication 0.02 MCG/KG/MIN: at 09:17

## 2024-06-24 RX ADMIN — METOPROLOL TARTRATE 12.5 MG: 25 TABLET, FILM COATED ORAL at 06:56

## 2024-06-24 RX ADMIN — CEFAZOLIN 2 G: 1 INJECTION, POWDER, FOR SOLUTION INTRAMUSCULAR; INTRAVENOUS at 09:10

## 2024-06-24 RX ADMIN — SENNOSIDES AND DOCUSATE SODIUM 1 TABLET: 50; 8.6 TABLET ORAL at 16:39

## 2024-06-24 RX ADMIN — FENTANYL CITRATE 100 MCG: 50 INJECTION INTRAVENOUS at 10:45

## 2024-06-24 RX ADMIN — FENTANYL CITRATE 50 MCG: 50 INJECTION INTRAVENOUS at 09:28

## 2024-06-24 RX ADMIN — MUPIROCIN: 20 OINTMENT TOPICAL at 21:02

## 2024-06-24 RX ADMIN — SODIUM BICARBONATE 50 MEQ: 84 INJECTION, SOLUTION INTRAVENOUS at 16:32

## 2024-06-24 RX ADMIN — INSULIN HUMAN 10 UNITS: 100 INJECTION, SOLUTION PARENTERAL at 11:58

## 2024-06-24 RX ADMIN — POLYETHYLENE GLYCOL 3350 17 G: 17 POWDER, FOR SOLUTION ORAL at 16:39

## 2024-06-24 RX ADMIN — SODIUM CHLORIDE 3.5 UNITS/HR: 9 INJECTION, SOLUTION INTRAVENOUS at 16:21

## 2024-06-24 RX ADMIN — FENTANYL CITRATE 150 MCG: 50 INJECTION INTRAVENOUS at 09:54

## 2024-06-24 RX ADMIN — PROPOFOL 10 MCG/KG/MIN: 10 INJECTION, EMULSION INTRAVENOUS at 23:25

## 2024-06-24 RX ADMIN — FENTANYL CITRATE 25 MCG: 50 INJECTION, SOLUTION INTRAMUSCULAR; INTRAVENOUS at 21:01

## 2024-06-24 RX ADMIN — SODIUM BICARBONATE 50 MEQ: 84 INJECTION, SOLUTION INTRAVENOUS at 17:42

## 2024-06-24 RX ADMIN — Medication 100 MCG: at 09:36

## 2024-06-24 RX ADMIN — SODIUM CHLORIDE: 9 INJECTION, SOLUTION INTRAVENOUS at 00:07

## 2024-06-24 RX ADMIN — EPHEDRINE SULFATE 5 MG: 5 INJECTION INTRAVENOUS at 09:46

## 2024-06-24 RX ADMIN — SODIUM BICARBONATE 50 MEQ: 84 INJECTION, SOLUTION INTRAVENOUS at 16:25

## 2024-06-24 RX ADMIN — VANCOMYCIN HYDROCHLORIDE 1750 MG: 1 INJECTION, POWDER, LYOPHILIZED, FOR SOLUTION INTRAVENOUS at 09:11

## 2024-06-24 RX ADMIN — Medication 100 MCG: at 09:35

## 2024-06-24 RX ADMIN — POTASSIUM CHLORIDE 20 MEQ: 29.8 INJECTION, SOLUTION INTRAVENOUS at 19:39

## 2024-06-24 RX ADMIN — MIDAZOLAM 2 MG: 1 INJECTION INTRAMUSCULAR; INTRAVENOUS at 08:46

## 2024-06-24 RX ADMIN — ROCURONIUM BROMIDE 50 MG: 10 INJECTION, SOLUTION INTRAVENOUS at 11:51

## 2024-06-24 RX ADMIN — VASOPRESSIN 0.05 UNITS/MIN: 0.2 INJECTION INTRAVENOUS at 16:42

## 2024-06-24 RX ADMIN — ROCURONIUM BROMIDE 30 MG: 10 INJECTION, SOLUTION INTRAVENOUS at 12:44

## 2024-06-24 RX ADMIN — Medication 100 MCG: at 09:02

## 2024-06-24 RX ADMIN — ALBUMIN (HUMAN) 25 G: 12.5 INJECTION, SOLUTION INTRAVENOUS at 16:32

## 2024-06-24 RX ADMIN — CHLORHEXIDINE GLUCONATE 15 ML: 1.2 RINSE ORAL at 06:10

## 2024-06-24 RX ADMIN — ROCURONIUM BROMIDE 30 MG: 10 INJECTION, SOLUTION INTRAVENOUS at 10:17

## 2024-06-24 RX ADMIN — ALBUTEROL SULFATE 4 PUFF: 90 AEROSOL, METERED RESPIRATORY (INHALATION) at 12:38

## 2024-06-24 RX ADMIN — CALCIUM CHLORIDE 0.5 G: 100 INJECTION INTRAVENOUS; INTRAVENTRICULAR at 09:41

## 2024-06-24 RX ADMIN — CALCIUM CHLORIDE 0.5 G: 100 INJECTION INTRAVENOUS; INTRAVENTRICULAR at 09:16

## 2024-06-24 RX ADMIN — PROPOFOL 30 MG: 10 INJECTION, EMULSION INTRAVENOUS at 10:47

## 2024-06-24 RX ADMIN — ROCURONIUM BROMIDE 50 MG: 10 INJECTION, SOLUTION INTRAVENOUS at 09:50

## 2024-06-24 RX ADMIN — ALBUMIN (HUMAN) 25 G: 12.5 INJECTION, SOLUTION INTRAVENOUS at 17:48

## 2024-06-24 RX ADMIN — PROPOFOL 15 MCG/KG/MIN: 10 INJECTION, EMULSION INTRAVENOUS at 12:43

## 2024-06-24 RX ADMIN — CEFAZOLIN 2 G: 1 INJECTION, POWDER, FOR SOLUTION INTRAMUSCULAR; INTRAVENOUS at 12:57

## 2024-06-24 RX ADMIN — SODIUM CHLORIDE, PRESERVATIVE FREE 10 ML: 5 INJECTION INTRAVENOUS at 20:38

## 2024-06-24 RX ADMIN — DEXMEDETOMIDINE HYDROCHLORIDE 0.2 MCG/KG/HR: 4 INJECTION, SOLUTION INTRAVENOUS at 12:43

## 2024-06-24 RX ADMIN — SODIUM CHLORIDE, POTASSIUM CHLORIDE, SODIUM LACTATE AND CALCIUM CHLORIDE: 600; 310; 30; 20 INJECTION, SOLUTION INTRAVENOUS at 12:54

## 2024-06-24 RX ADMIN — EPHEDRINE SULFATE 5 MG: 5 INJECTION INTRAVENOUS at 09:50

## 2024-06-24 RX ADMIN — DEXMEDETOMIDINE HYDROCHLORIDE 0.8 MCG/KG/HR: 400 INJECTION, SOLUTION INTRAVENOUS at 18:52

## 2024-06-24 RX ADMIN — FENTANYL CITRATE 50 MCG: 50 INJECTION INTRAVENOUS at 09:24

## 2024-06-24 RX ADMIN — SODIUM BICARBONATE 50 MEQ: 84 INJECTION, SOLUTION INTRAVENOUS at 14:48

## 2024-06-24 RX ADMIN — SODIUM CHLORIDE, POTASSIUM CHLORIDE, SODIUM LACTATE AND CALCIUM CHLORIDE: 600; 310; 30; 20 INJECTION, SOLUTION INTRAVENOUS at 08:52

## 2024-06-24 RX ADMIN — DEXMEDETOMIDINE HYDROCHLORIDE 0.8 MCG/KG/HR: 400 INJECTION, SOLUTION INTRAVENOUS at 23:16

## 2024-06-24 RX ADMIN — SODIUM CHLORIDE: 9 INJECTION, SOLUTION INTRAVENOUS at 19:39

## 2024-06-24 RX ADMIN — SODIUM BICARBONATE 50 MEQ: 84 INJECTION, SOLUTION INTRAVENOUS at 14:06

## 2024-06-24 RX ADMIN — EPHEDRINE SULFATE 5 MG: 5 INJECTION INTRAVENOUS at 09:43

## 2024-06-24 RX ADMIN — MILRINONE LACTATE IN DEXTROSE 0.38 MCG/KG/MIN: 200 INJECTION, SOLUTION INTRAVENOUS at 18:28

## 2024-06-24 RX ADMIN — FENTANYL CITRATE 50 MCG: 50 INJECTION INTRAVENOUS at 10:27

## 2024-06-24 RX ADMIN — INSULIN HUMAN 5 UNITS: 100 INJECTION, SOLUTION PARENTERAL at 11:32

## 2024-06-24 RX ADMIN — PROPOFOL 10 MCG/KG/MIN: 10 INJECTION, EMULSION INTRAVENOUS at 18:25

## 2024-06-24 RX ADMIN — EPHEDRINE SULFATE 5 MG: 5 INJECTION INTRAVENOUS at 09:40

## 2024-06-24 RX ADMIN — FENTANYL CITRATE 250 MCG: 50 INJECTION INTRAVENOUS at 10:15

## 2024-06-24 RX ADMIN — PANTOPRAZOLE SODIUM 40 MG: 40 INJECTION, POWDER, FOR SOLUTION INTRAVENOUS at 16:39

## 2024-06-24 RX ADMIN — POTASSIUM CHLORIDE 20 MEQ: 29.8 INJECTION, SOLUTION INTRAVENOUS at 17:59

## 2024-06-24 RX ADMIN — Medication 0.5 MG/MIN: at 21:32

## 2024-06-24 RX ADMIN — MIDAZOLAM 2 MG: 1 INJECTION INTRAMUSCULAR; INTRAVENOUS at 12:01

## 2024-06-24 RX ADMIN — SODIUM CHLORIDE: 9 INJECTION, SOLUTION INTRAVENOUS at 09:17

## 2024-06-24 RX ADMIN — SODIUM CHLORIDE 1500 MG: 9 INJECTION, SOLUTION INTRAVENOUS at 21:00

## 2024-06-24 RX ADMIN — ALBUMIN (HUMAN) 12.5 G: 12.5 INJECTION, SOLUTION INTRAVENOUS at 13:20

## 2024-06-24 RX ADMIN — ALBUMIN (HUMAN) 25 G: 12.5 INJECTION, SOLUTION INTRAVENOUS at 14:00

## 2024-06-24 RX ADMIN — AMINOCAPROIC ACID 10 G/HR: 250 INJECTION, SOLUTION INTRAVENOUS at 09:17

## 2024-06-24 RX ADMIN — POTASSIUM CHLORIDE 20 MEQ: 29.8 INJECTION, SOLUTION INTRAVENOUS at 14:44

## 2024-06-24 RX ADMIN — ROCURONIUM BROMIDE 20 MG: 10 INJECTION, SOLUTION INTRAVENOUS at 10:52

## 2024-06-24 RX ADMIN — HEPARIN SODIUM 40000 UNITS: 1000 INJECTION INTRAVENOUS; SUBCUTANEOUS at 10:38

## 2024-06-24 RX ADMIN — ETOMIDATE INJECTION 20 MG: 2 SOLUTION INTRAVENOUS at 08:52

## 2024-06-24 RX ADMIN — Medication 50 MCG: at 10:49

## 2024-06-24 RX ADMIN — FENTANYL CITRATE 100 MCG: 50 INJECTION INTRAVENOUS at 09:59

## 2024-06-24 RX ADMIN — ASPIRIN 81 MG: 81 TABLET, COATED ORAL at 06:56

## 2024-06-24 RX ADMIN — FENTANYL CITRATE 100 MCG: 50 INJECTION INTRAVENOUS at 10:24

## 2024-06-24 ASSESSMENT — PULMONARY FUNCTION TESTS
PIF_VALUE: 24
PIF_VALUE: 27
PIF_VALUE: 21
PIF_VALUE: 23

## 2024-06-24 NOTE — ANESTHESIA POSTPROCEDURE EVALUATION
Department of Anesthesiology  Postprocedure Note    Patient: Jesús Min  MRN: [id number]  YOB: 1975  Date of evaluation: 6/24/2024    Procedure Summary       Date: 06/24/24 Room / Location: [address]    Anesthesia Start: 0846 Anesthesia Stop: 1353    Procedure: CORONARY ARTERY BYPASS GRAFT X3, POSSIBLE RADIAL HARVEST, ON PUMP, SWAN, NORTH (Chest) Diagnosis:       Multiple vessel coronary artery disease      (Multiple vessel coronary artery disease [I25.10])    Surgeons: Ricardo James MD Responsible Provider: Gerson Goodman MD    Anesthesia Type: general ASA Status: 4            Anesthesia Type: No value filed.    Jefferson Phase I:      Jefferson Phase II:      Anesthesia Post Evaluation    Patient location during evaluation: ICU  Patient participation: complete - patient cannot participate  Level of consciousness: sedated and ventilated  Pain score: 0  Airway patency: patent  Nausea & Vomiting: no vomiting and no nausea  Cardiovascular status: hemodynamically stable  Respiratory status: acceptable, ventilator and intubated  Hydration status: stable  Pain management: adequate    No notable events documented.

## 2024-06-24 NOTE — ANESTHESIA PRE PROCEDURE
Department of Anesthesiology  Preprocedure Note       Name:  Jesús Min   Age:  49 y.o.  :  1975                                          MRN:  [id number]         Date:  2024      Surgeon: Surgeon(s):  Ricardo James MD    Procedure: Procedure(s):  CORONARY ARTERY BYPASS GRAFT X3, POSSIBLE RADIAL HARVEST, ON PUMP, SWAN, NORTH    Medications prior to admission:   Prior to Admission medications    Medication Sig Start Date End Date Taking? Authorizing Provider   guaiFENesin (MUCINEX) 600 MG extended release tablet Take 1 tablet by mouth 2 times daily for 15 days 24  Ania Serrato APRN - CNP   fluticasone (FLONASE) 50 MCG/ACT nasal spray 2 sprays by Each Nostril route daily 24   Ania Serrato APRN - CNP   azithromycin (ZITHROMAX Z-IVAN) 250 MG tablet Take 2 tablets (500 mg) on Day 1, and then take 1 tablet (250 mg) on days 2 through 5. 24   Ania Serrato APRN - CNP   albuterol sulfate HFA (VENTOLIN HFA) 108 (90 Base) MCG/ACT inhaler Inhale 2 puffs into the lungs 4 times daily as needed for Wheezing 24   Alison Nova APRN - CNP       Current medications:    Current Facility-Administered Medications   Medication Dose Route Frequency Provider Last Rate Last Admin   • LORazepam (ATIVAN) tablet 0.5 mg  0.5 mg Oral Q4H PRN Rio Collazo DO       • 0.9 % sodium chloride infusion   IntraVENous Continuous Kermit Rosales PA 75 mL/hr at 24 0007 New Bag at 24 000   • sodium chloride flush 0.9 % injection 5-40 mL  5-40 mL IntraVENous 2 times per day Kermit Rosales PA       • sodium chloride flush 0.9 % injection 10 mL  10 mL IntraVENous PRN Kermit Rosales PA       • 0.9 % sodium chloride infusion   IntraVENous PRN Kermit Rosales PA       • ceFAZolin (ANCEF) 2000 mg in 0.9% sodium chloride 50 mL IVPB  2,000 mg IntraVENous On Call to OR Kermit Rosales PA       • metoprolol tartrate (LOPRESSOR) tablet 12.5 mg  12.5 mg Oral Once

## 2024-06-24 NOTE — ANESTHESIA PROCEDURE NOTES
Central Venous Line:    A central venous line was placed using surface landmarks, in the OR for the following indication(s): central venous access and CVP monitoring.    Sterility preparation included the following: provider used sterile gloves, gown, hat and mask and maximum sterile barriers used during central venous catheter insertion.    The patient was placed in Trendelenburg position.The right subclavian vein was prepped.    The site was prepped with Chloraprep.double lumen was placed.    During the procedure, the following specific steps were taken: target vein identified, needle advanced into vein and blood aspirated and guidewire advanced into vein.    Intravenous verification was obtained by venous blood return, NORTH and NORTH.    Post insertion care included: all ports aspirated, all ports flushed easily, guidewire removed intact, Biopatch applied, line sutured in place and dressing applied.    During the procedure the patient experienced: patient tolerated procedure well with no complications and EBL < 5mL.      Outcomes: uncomplicated and patient tolerated procedure wellNo  Anesthesia type: general  Staffing  Anesthesiologist: Gerson Goodman MD  Performed by: Gerson Goodman MD  Authorized by: Gerson Goodman MD    Preanesthetic Checklist  Completed: patient identified, timeout performed and monitors applied/VS acknowledged

## 2024-06-24 NOTE — PROGRESS NOTES
[facility]  Office: [phone]  Francis Wynne DO, Barry Devries, DO, Wei Downing DO, Khris Scott, DO, David Evans MD, Kenia Gillis MD, Cecil Hampton MD, Jessy Franco MD,  Leroy Hill MD, Florecita Moran MD, Dylan Yeager MD,  Rio Collazo DO, Taisha Andrew MD, Donn Parks MD, Paul Wynne DO, Olimpia Hernandez MD,  Zia Dowd DO, Bindu Danielson MD, Any Miller MD, Sherly Angeles MD, Cl Alexander MD,  Jorge Luis Bahena MD, Robin Wilson MD, Bubba Candelario MD, Dahlia Stapleton MD, Elder Rios MD, Dinesh Ledezma MD, Raj Jenkins DO, Ranjan Rashid DO, Aubrey Rodriguez MD,  Edu Vazquez MD, Shirley Waterhouse, CNP,  Chelle Perdomo CNP, Ricardo Webb, CNP,  Ellen Jacinto, DNP, Bess Tao, CNP, Saira Taveras, CNP, Francine June CNP, Kati Toussaint CNP, Sidra García, PA-C, Farrah Jose PA-C, Ronel Hudson, CNP, Kendal Flor, CNP, Prakash Gardiner CNP, Mayela Sweeney, CNP, Darlyn Izaguirre CNP, Shauna Gibbons, CNS, Priyanka Maldonado, CNP, Jacquelin Bucio CNP, Tracy Schwab, CNP         [facility]   IN-PATIENT SERVICE   [facility]    Progress Note    6/24/2024    12:42 PM    Name:   Jesús Min  MRN:     [id number]     Acct:      [id number]   Room:   [facility] Day:  6  Admit Date:  6/18/2024  3:00 PM    PCP:   No primary care provider on file.  Code Status:  Full Code    Subjective:   Family at bedside. He has no complaints. No fevers,chills. Labs stable.       Brief History:     This is a 49-year-old male who presented to the hospital as a transfer for evaluation for CABG    Medications:     Allergies:    Allergies   Allergen Reactions    Codeine Nausea Only    Pcn [Penicillins]        Current Meds:   Scheduled Meds:    dexmedeTOMIDine HCl in NaCl        albumin human 5%        protamine        propofol        sodium bicarbonate        papaverine        vancomycin        sterile water        sterile water        [MAR

## 2024-06-24 NOTE — BRIEF OP NOTE
Brief Postoperative Note      Patient: Jesús Min  YOB: 1975  MRN: [id number]    Date of Procedure: 6/24/2024    Pre-Op Diagnosis Codes:     * Multiple vessel coronary artery disease [I25.10]    Post-Op Diagnosis: Same       Procedure(s):  CORONARY ARTERY BYPASS GRAFT X3, POSSIBLE RADIAL HARVEST, ON PUMP, SWAN, NORTH    Surgeon(s):  Ricardo Renee MD    Assistant:  Surgical Assistant: Mj Michaud    Anesthesia: General    Estimated Blood Loss (mL): N/A    Complications: None    Specimens:   * No specimens in log *    Implants:  Implant Name Type Inv. Item Serial No.  Lot No. LRB No. Used Action   CLIP LIG M JUAN TI HRT SHP WIRE HORZ 6 CLIPS PER PK - [id number]  CLIP LIG M JUAN TI HRT SHP WIRE HORZ 6 CLIPS PER PK  [employer] [id number] N/A 1 Implanted   CLIP INT SM WIDE WECK RED TI TRNSVRS GRV CHEVRON SHP W/ PERCIS TIP 24 PER PK - [id number]  CLIP INT SM WIDE WECK RED TI TRNSVRS GRV CHEVRON SHP W/ PERCIS TIP 24 PER PK  [employer] [id number] N/A 1 Implanted   CLIP LIG M JUAN TI HRT SHP WIRE HORZ 6 CLIPS PER PK - [id number]  CLIP LIG M JUAN TI HRT SHP WIRE HORZ 6 CLIPS PER PK  [employer] [id number] N/A 1 Implanted         Drains:   Chest Tube Left Pleural 1 (Active)       Chest Tube Mediastinal (Active)       Urinary Catheter 06/24/24 [place]m-Temperature (Active)       Findings:  Infection Present At Time Of Surgery (PATOS) (choose all levels that have infection present):  No infection present  Other Findings: CABG X 3 w/ LIMA to LAD, RSVG1 to OM and RSVG2 to RPDA (small, chronically occluded vessel).  All of the targets were inflamed and calcified and difficult to graft.    Electronically signed by Ricardo Renee MD on 6/24/2024 at 1:24 PM

## 2024-06-24 NOTE — PROGRESS NOTES
Accompanied per CVOR RN x 2, CVOR CRNA, and perfusion.  Pt transported via bed, pt is unresponsive, sedated on propofol,  ambu, RRT placed on mechanical ventilator.  All lines and invasive monitors connected and transduced per protocol.  VSS and documented on record. Patient tolerated procedure well, Care transferred to CVICU RN and handoff completed at bedside.     1400: Dr. Goodman to bedside to check placement of SWAN d/t no PAP waveform.  1500: PAP waveform correct on monitor. Stat CXR to verify placement. Placement okay per Rex Cortes.  1600: Reached out to CTS about double concentrating pts drips to reduce volume intake, not needed at this time d/t lower CVP.

## 2024-06-24 NOTE — ANESTHESIA PROCEDURE NOTES
Central Venous Line:    A central venous line was placed using surface landmarks, in the OR for the following indication(s): central venous access and CVP monitoring.    Sterility preparation included the following: provider used sterile gloves, gown, hat and mask and maximum sterile barriers used during central venous catheter insertion.    The patient was placed in Trendelenburg position.The left subclavian vein was prepped.    The site was prepped with Chloraprep.introducer     Intravenous verification was obtained by venous blood return, NORTH and NORTH.    Post insertion care included: all ports aspirated, all ports flushed easily, guidewire removed intact, Biopatch applied, line sutured in place and dressing applied.    During the procedure the patient experienced: patient tolerated procedure well with no complications and EBL < 5mL.      Outcomes: uncomplicated and patient tolerated procedure wellNo  Anesthesia type: generalA(n) oximetric, 7.5 (size) Pulmonary Artery Catheter (PAC) was placed through the Introducer CVL in the left subclavian vein.  The PAC placement was confirmed by pressure tracing changes and NORTH.PA Cath placed?: Yes  Staffing  Anesthesiologist: Gerson Goodman MD  Performed by: Gerson Goodman MD  Authorized by: Gerson Goodman MD    Preanesthetic Checklist  Completed: patient identified, timeout performed and monitors applied/VS acknowledged

## 2024-06-24 NOTE — ANESTHESIA PROCEDURE NOTES
Procedure Performed: NORTH       Start Time:  6/24/2024 9:30 AM       End Time:   6/24/2024 9:45 AM    Preanesthesia Checklist:  Patient identified, IV assessed, risks and benefits discussed, monitors and equipment assessed, procedure being performed at surgeon's request and anesthesia consent obtained.    General Procedure Information  Diagnostic Indications for Echo:  hemodynamic monitoring  Physician Requesting Echo: Ricardo James MD  Location performed:  OR  Intubated  Bite block not placed  Heart visualized  Probe Insertion:  Easy  Probe Type:  3D  Modalities:  2D, color flow mapping and continuous wave Doppler    Echocardiographic and Doppler Measurements    Ventricles    Right Ventricle:  Cavity size dilated.  Hypertrophy not present.  Thrombus not present.  Global function mildly impaired.    Left Ventricle:  Cavity size dilated.  Hypertrophy not present.  Thrombus not present.  Global Function severely impaired.  Ejection Fraction 20%.      Ventricular Regional Function:    Wall Motion Comments:       Global hypokinesia     Valves    Aortic Valve:  Annulus normal.  Stenosis not present.  Regurgitation none.  Leaflets normal.  Leaflet motions normal.      Mitral Valve:  Annulus normal.  Stenosis not present.  Regurgitation mild.  Leaflets normal.  Leaflet motions normal.      Tricuspid Valve:  Annulus normal.  Stenosis not present.  Regurgitation mild.  Leaflet motions normal.    Pulmonic Valve:  Annulus normal.  Stenosis not present.  Regurgitation none.        Aorta    Ascending Aorta:  Size normal.  Dissection not present.    Aortic Arch:  Size normal.  Dissection not present.    Descending Aorta:  Size normal.  Dissection not present.        Atria    Right Atrium:  Size normal.  Spontaneous echo contrast not present.  Thrombus not present.  Tumor not present.  Device not present.      Left Atrium:  Size normal.  Spontaneous echo contrast not present.  Thrombus not present.  Tumor not present.  Device

## 2024-06-24 NOTE — PLAN OF CARE
Problem: Respiratory - Adult  Goal: Achieves optimal ventilation and oxygenation  Outcome: Progressing  Flowsheets (Taken 6/22/2024 2000 by Jojre Ferrer RN)  Achieves optimal ventilation and oxygenation:   Assess for changes in respiratory status   Assess for changes in mentation and behavior   Position to facilitate oxygenation and minimize respiratory effort

## 2024-06-24 NOTE — PLAN OF CARE
Problem: Safety - Adult  Goal: Free from fall injury  6/24/2024 1845 by Ramon West RN  Outcome: Progressing  6/24/2024 0754 by Jorje Ferrer RN  Outcome: Progressing     Problem: Chronic Conditions and Co-morbidities  Goal: Patient's chronic conditions and co-morbidity symptoms are monitored and maintained or improved  6/24/2024 1845 by Ramon West RN  Outcome: Progressing  6/24/2024 0754 by Jorje Ferrer RN  Outcome: Progressing     Problem: Discharge Planning  Goal: Discharge to home or other facility with appropriate resources  6/24/2024 1845 by Ramon West RN  Outcome: Progressing  6/24/2024 0754 by Jorje Ferrer RN  Outcome: Progressing     Problem: ABCDS Injury Assessment  Goal: Absence of physical injury  6/24/2024 1845 by Ramon West RN  Outcome: Progressing  6/24/2024 0754 by Jorje Ferrer RN  Outcome: Progressing     Problem: Respiratory - Adult  Goal: Achieves optimal ventilation and oxygenation  6/24/2024 1845 by Ramon West RN  Outcome: Progressing  6/24/2024 1356 by Samara Suazo, P  Outcome: Progressing  Flowsheets (Taken 6/22/2024 2000 by Jorje Ferrer RN)  Achieves optimal ventilation and oxygenation:   Assess for changes in respiratory status   Assess for changes in mentation and behavior   Position to facilitate oxygenation and minimize respiratory effort     Problem: Safety - Medical Restraint  Goal: Remains free of injury from restraints (Restraint for Interference with Medical Device)  Description: INTERVENTIONS:  1. Determine that other, less restrictive measures have been tried or would not be effective before applying the restraint  2. Evaluate the patient's condition at the time of restraint application  3. Inform patient/family regarding the reason for restraint  4. Q2H: Monitor safety, psychosocial status, comfort, nutrition and hydration  Outcome: Progressing  Flowsheets  Taken 6/24/2024 1800  Remains free of injury from restraints (restraint for interference with

## 2024-06-25 ENCOUNTER — APPOINTMENT (OUTPATIENT)
Dept: GENERAL RADIOLOGY | Age: 49
End: 2024-06-25
Attending: STUDENT IN AN ORGANIZED HEALTH CARE EDUCATION/TRAINING PROGRAM
Payer: COMMERCIAL

## 2024-06-25 LAB
ABO/RH: NORMAL
ALLEN TEST: ABNORMAL
ALLEN TEST: ABNORMAL
ANION GAP SERPL CALCULATED.3IONS-SCNC: 11 MMOL/L (ref 9–16)
ANTIBODY SCREEN: NEGATIVE
ARM BAND NUMBER: NORMAL
BLOOD BANK DISPENSE STATUS: NORMAL
BLOOD BANK DISPENSE STATUS: NORMAL
BLOOD BANK SAMPLE EXPIRATION: NORMAL
BPU ID: NORMAL
BPU ID: NORMAL
BUN SERPL-MCNC: 10 MG/DL (ref 6–20)
CA-I BLD-SCNC: 1.13 MMOL/L (ref 1.13–1.33)
CALCIUM SERPL-MCNC: 8.3 MG/DL (ref 8.6–10.4)
CHLORIDE SERPL-SCNC: 110 MMOL/L (ref 98–107)
CO2 SERPL-SCNC: 22 MMOL/L (ref 20–31)
COMPONENT: NORMAL
COMPONENT: NORMAL
CREAT SERPL-MCNC: 0.9 MG/DL (ref 0.7–1.2)
CROSSMATCH RESULT: NORMAL
CROSSMATCH RESULT: NORMAL
ERYTHROCYTE [DISTWIDTH] IN BLOOD BY AUTOMATED COUNT: 15.3 % (ref 11.8–14.4)
FIO2: 40
FIO2: 45
GFR, ESTIMATED: >90 ML/MIN/1.73M2
GLUCOSE BLD-MCNC: 100 MG/DL (ref 74–100)
GLUCOSE BLD-MCNC: 103 MG/DL (ref 75–110)
GLUCOSE BLD-MCNC: 103 MG/DL (ref 75–110)
GLUCOSE BLD-MCNC: 104 MG/DL (ref 75–110)
GLUCOSE BLD-MCNC: 106 MG/DL (ref 74–100)
GLUCOSE BLD-MCNC: 107 MG/DL (ref 75–110)
GLUCOSE BLD-MCNC: 108 MG/DL (ref 75–110)
GLUCOSE BLD-MCNC: 111 MG/DL (ref 75–110)
GLUCOSE BLD-MCNC: 118 MG/DL (ref 75–110)
GLUCOSE BLD-MCNC: 132 MG/DL (ref 75–110)
GLUCOSE BLD-MCNC: 151 MG/DL (ref 75–110)
GLUCOSE BLD-MCNC: 92 MG/DL (ref 75–110)
GLUCOSE BLD-MCNC: 99 MG/DL (ref 75–110)
GLUCOSE SERPL-MCNC: 104 MG/DL (ref 74–99)
HBCO, MIXED, EXTENDED: 1.6 % (ref 0–5)
HCT VFR BLD AUTO: 32.2 % (ref 40.7–50.3)
HEMOGLOBIN, MIXED, EXTENDED: 10.3 G/DL (ref 12–18)
HGB BLD-MCNC: 10.1 G/DL (ref 13–17)
INR PPP: 1.3
MAGNESIUM SERPL-MCNC: 2.5 MG/DL (ref 1.6–2.6)
MCH RBC QN AUTO: 28 PG (ref 25.2–33.5)
MCHC RBC AUTO-ENTMCNC: 31.4 G/DL (ref 28.4–34.8)
MCV RBC AUTO: 89.2 FL (ref 82.6–102.9)
METHB, MIXED, EXTENDED: 1.1 % (ref 0–1.5)
MODE: ABNORMAL
NEGATIVE BASE EXCESS, ART: 1.1 MMOL/L (ref 0–2)
NEGATIVE BASE EXCESS, ART: 1.4 MMOL/L (ref 0–2)
NRBC BLD-RTO: 0 PER 100 WBC
O2 CONTENT, MIXED, EXTENDED: 9 VOL % (ref 12–20)
O2 DELIVERY DEVICE: ABNORMAL
O2 DELIVERY DEVICE: ABNORMAL
OXYGEN STATUS: ABNORMAL
PATIENT TEMP: 38.1
PLATELET # BLD AUTO: ABNORMAL K/UL (ref 138–453)
PLATELET, FLUORESCENCE: 154 K/UL (ref 138–453)
PLATELETS.RETICULATED NFR BLD AUTO: 5.2 % (ref 1.1–10.3)
POC HCO3: 23.2 MMOL/L (ref 21–28)
POC HCO3: 23.5 MMOL/L (ref 21–28)
POC LACTIC ACID: 2.1 MMOL/L (ref 0.56–1.39)
POC O2 SATURATION: 92.5 % (ref 94–98)
POC O2 SATURATION: 94.6 % (ref 94–98)
POC PCO2 TEMP: 39.5 MM HG
POC PCO2: 37.5 MM HG (ref 35–48)
POC PCO2: 37.7 MM HG (ref 35–48)
POC PH TEMP: 7.39
POC PH: 7.4 (ref 7.35–7.45)
POC PH: 7.4 (ref 7.35–7.45)
POC PO2 TEMP: 69.8 MM HG
POC PO2: 64.7 MM HG (ref 83–108)
POC PO2: 73.1 MM HG (ref 83–108)
POTASSIUM SERPL-SCNC: 4.8 MMOL/L (ref 3.7–5.3)
PROTHROMBIN TIME: 15.8 SEC (ref 11.7–14.9)
RBC # BLD AUTO: 3.61 M/UL (ref 4.21–5.77)
SAMPLE SITE: ABNORMAL
SAMPLE SITE: ABNORMAL
SAO2 % BLDMV: 65.8 % (ref 60–80)
SODIUM SERPL-SCNC: 143 MMOL/L (ref 136–145)
TRANSFUSION STATUS: NORMAL
TRANSFUSION STATUS: NORMAL
UNIT DIVISION: 0
UNIT DIVISION: 0
WBC OTHER # BLD: 15.2 K/UL (ref 3.5–11.3)

## 2024-06-25 PROCEDURE — 99024 POSTOP FOLLOW-UP VISIT: CPT | Performed by: PHYSICIAN ASSISTANT

## 2024-06-25 PROCEDURE — 37799 UNLISTED PX VASCULAR SURGERY: CPT

## 2024-06-25 PROCEDURE — 83605 ASSAY OF LACTIC ACID: CPT

## 2024-06-25 PROCEDURE — 80048 BASIC METABOLIC PNL TOTAL CA: CPT

## 2024-06-25 PROCEDURE — 82375 ASSAY CARBOXYHB QUANT: CPT

## 2024-06-25 PROCEDURE — 82947 ASSAY GLUCOSE BLOOD QUANT: CPT

## 2024-06-25 PROCEDURE — 71045 X-RAY EXAM CHEST 1 VIEW: CPT

## 2024-06-25 PROCEDURE — 85055 RETICULATED PLATELET ASSAY: CPT

## 2024-06-25 PROCEDURE — P9041 ALBUMIN (HUMAN),5%, 50ML: HCPCS | Performed by: NURSE PRACTITIONER

## 2024-06-25 PROCEDURE — 85610 PROTHROMBIN TIME: CPT

## 2024-06-25 PROCEDURE — 6360000002 HC RX W HCPCS: Performed by: NURSE PRACTITIONER

## 2024-06-25 PROCEDURE — 2700000000 HC OXYGEN THERAPY PER DAY

## 2024-06-25 PROCEDURE — 82330 ASSAY OF CALCIUM: CPT

## 2024-06-25 PROCEDURE — 83735 ASSAY OF MAGNESIUM: CPT

## 2024-06-25 PROCEDURE — 85027 COMPLETE CBC AUTOMATED: CPT

## 2024-06-25 PROCEDURE — C9113 INJ PANTOPRAZOLE SODIUM, VIA: HCPCS | Performed by: NURSE PRACTITIONER

## 2024-06-25 PROCEDURE — 2580000003 HC RX 258: Performed by: NURSE PRACTITIONER

## 2024-06-25 PROCEDURE — 82803 BLOOD GASES ANY COMBINATION: CPT

## 2024-06-25 PROCEDURE — 94761 N-INVAS EAR/PLS OXIMETRY MLT: CPT

## 2024-06-25 PROCEDURE — 6370000000 HC RX 637 (ALT 250 FOR IP): Performed by: NURSE PRACTITIONER

## 2024-06-25 PROCEDURE — 99233 SBSQ HOSP IP/OBS HIGH 50: CPT | Performed by: INTERNAL MEDICINE

## 2024-06-25 PROCEDURE — 2100000001 HC CVICU R&B

## 2024-06-25 PROCEDURE — 82805 BLOOD GASES W/O2 SATURATION: CPT

## 2024-06-25 PROCEDURE — 2500000003 HC RX 250 WO HCPCS: Performed by: NURSE PRACTITIONER

## 2024-06-25 PROCEDURE — 83050 HGB METHEMOGLOBIN QUAN: CPT

## 2024-06-25 RX ORDER — ASPIRIN 81 MG/1
81 TABLET, CHEWABLE ORAL DAILY
Status: DISCONTINUED | OUTPATIENT
Start: 2024-06-26 | End: 2024-06-26

## 2024-06-25 RX ADMIN — VASOPRESSIN 0.03 UNITS/MIN: 0.2 INJECTION INTRAVENOUS at 08:53

## 2024-06-25 RX ADMIN — ATORVASTATIN CALCIUM 20 MG: 20 TABLET, FILM COATED ORAL at 20:14

## 2024-06-25 RX ADMIN — MAGNESIUM SULFATE HEPTAHYDRATE 2000 MG: 40 INJECTION, SOLUTION INTRAVENOUS at 03:38

## 2024-06-25 RX ADMIN — EPINEPHRINE 0.02 MCG/KG/MIN: 1 INJECTION INTRAMUSCULAR; INTRAVENOUS; SUBCUTANEOUS at 06:00

## 2024-06-25 RX ADMIN — SENNOSIDES AND DOCUSATE SODIUM 1 TABLET: 50; 8.6 TABLET ORAL at 20:14

## 2024-06-25 RX ADMIN — OXYCODONE 10 MG: 5 TABLET ORAL at 09:32

## 2024-06-25 RX ADMIN — SODIUM CHLORIDE 1500 MG: 9 INJECTION, SOLUTION INTRAVENOUS at 08:53

## 2024-06-25 RX ADMIN — OXYCODONE 10 MG: 5 TABLET ORAL at 17:29

## 2024-06-25 RX ADMIN — ASPIRIN 81 MG: 81 TABLET, COATED ORAL at 10:45

## 2024-06-25 RX ADMIN — SENNOSIDES AND DOCUSATE SODIUM 1 TABLET: 50; 8.6 TABLET ORAL at 08:44

## 2024-06-25 RX ADMIN — SODIUM CHLORIDE, PRESERVATIVE FREE 10 ML: 5 INJECTION INTRAVENOUS at 07:59

## 2024-06-25 RX ADMIN — ALBUMIN (HUMAN) 25 G: 12.5 INJECTION, SOLUTION INTRAVENOUS at 12:06

## 2024-06-25 RX ADMIN — MILRINONE LACTATE IN DEXTROSE 0.38 MCG/KG/MIN: 200 INJECTION, SOLUTION INTRAVENOUS at 09:51

## 2024-06-25 RX ADMIN — POLYETHYLENE GLYCOL 3350 17 G: 17 POWDER, FOR SOLUTION ORAL at 08:44

## 2024-06-25 RX ADMIN — VASOPRESSIN 0.04 UNITS/MIN: 0.2 INJECTION INTRAVENOUS at 00:12

## 2024-06-25 RX ADMIN — MILRINONE LACTATE IN DEXTROSE 0.38 MCG/KG/MIN: 200 INJECTION, SOLUTION INTRAVENOUS at 01:20

## 2024-06-25 RX ADMIN — MILRINONE LACTATE IN DEXTROSE 0.38 MCG/KG/MIN: 200 INJECTION, SOLUTION INTRAVENOUS at 17:32

## 2024-06-25 RX ADMIN — SODIUM CHLORIDE: 9 INJECTION, SOLUTION INTRAVENOUS at 04:31

## 2024-06-25 RX ADMIN — Medication 0.5 MG/MIN: at 22:01

## 2024-06-25 RX ADMIN — Medication 0.5 MG/MIN: at 10:52

## 2024-06-25 RX ADMIN — DEXMEDETOMIDINE HYDROCHLORIDE 0.6 MCG/KG/HR: 400 INJECTION, SOLUTION INTRAVENOUS at 04:09

## 2024-06-25 RX ADMIN — SODIUM CHLORIDE, PRESERVATIVE FREE 10 ML: 5 INJECTION INTRAVENOUS at 20:15

## 2024-06-25 RX ADMIN — MUPIROCIN: 20 OINTMENT TOPICAL at 07:59

## 2024-06-25 RX ADMIN — OXYCODONE 10 MG: 5 TABLET ORAL at 04:39

## 2024-06-25 RX ADMIN — DIPHENHYDRAMINE HYDROCHLORIDE 25 MG: 25 TABLET ORAL at 23:29

## 2024-06-25 RX ADMIN — CLOPIDOGREL BISULFATE 75 MG: 75 TABLET ORAL at 08:44

## 2024-06-25 RX ADMIN — SODIUM CHLORIDE 1500 MG: 9 INJECTION, SOLUTION INTRAVENOUS at 21:59

## 2024-06-25 RX ADMIN — ACETAMINOPHEN 650 MG: 325 TABLET ORAL at 14:59

## 2024-06-25 RX ADMIN — MUPIROCIN: 20 OINTMENT TOPICAL at 20:14

## 2024-06-25 RX ADMIN — FENTANYL CITRATE 25 MCG: 50 INJECTION, SOLUTION INTRAMUSCULAR; INTRAVENOUS at 15:48

## 2024-06-25 RX ADMIN — Medication 0.03 MCG/KG/MIN: at 00:29

## 2024-06-25 RX ADMIN — ACETAMINOPHEN 650 MG: 325 TABLET ORAL at 00:27

## 2024-06-25 RX ADMIN — SODIUM CHLORIDE: 9 INJECTION, SOLUTION INTRAVENOUS at 06:10

## 2024-06-25 RX ADMIN — OXYCODONE 10 MG: 5 TABLET ORAL at 00:26

## 2024-06-25 RX ADMIN — SODIUM CHLORIDE 4.3 UNITS/HR: 9 INJECTION, SOLUTION INTRAVENOUS at 02:05

## 2024-06-25 RX ADMIN — ACETAMINOPHEN 650 MG: 325 TABLET ORAL at 07:36

## 2024-06-25 RX ADMIN — OXYCODONE 10 MG: 5 TABLET ORAL at 13:24

## 2024-06-25 RX ADMIN — INSULIN LISPRO 2 UNITS: 100 INJECTION, SOLUTION INTRAVENOUS; SUBCUTANEOUS at 15:57

## 2024-06-25 RX ADMIN — PANTOPRAZOLE SODIUM 40 MG: 40 INJECTION, POWDER, FOR SOLUTION INTRAVENOUS at 08:44

## 2024-06-25 RX ADMIN — OXYCODONE 10 MG: 5 TABLET ORAL at 21:38

## 2024-06-25 ASSESSMENT — PAIN SCALES - GENERAL
PAINLEVEL_OUTOF10: 3
PAINLEVEL_OUTOF10: 8
PAINLEVEL_OUTOF10: 3
PAINLEVEL_OUTOF10: 4
PAINLEVEL_OUTOF10: 4
PAINLEVEL_OUTOF10: 7
PAINLEVEL_OUTOF10: 8
PAINLEVEL_OUTOF10: 6
PAINLEVEL_OUTOF10: 6

## 2024-06-25 ASSESSMENT — PULMONARY FUNCTION TESTS
PIF_VALUE: 14
PIF_VALUE: 23
PIF_VALUE: 20

## 2024-06-25 ASSESSMENT — PAIN DESCRIPTION - DESCRIPTORS: DESCRIPTORS: ACHING;DISCOMFORT

## 2024-06-25 ASSESSMENT — PAIN DESCRIPTION - LOCATION: LOCATION: CHEST

## 2024-06-25 NOTE — PROGRESS NOTES
[facility] Cardiothoracic Surgical   Progress Note    6/25/2024 8:23 AM  Surgeon:  Jacob          POD# 1    S/P :  Coronary artery bypass x 3  EF: 20 %    Subjective:  Mr. Min intubated    Vital Signs: BP (!) 104/51   Pulse 72   Temp (!) 100.6 °F (38.1 °C) (Bladder)   Resp 22   Ht 1.68 m (5' 6.14\")   Wt 116.9 kg (257 lb 12.8 oz)   SpO2 95%   BMI 41.43 kg/m²  O2 Flow Rate (L/min): 2 L/min   Admit Weight: Weight - Scale: 113.1 kg (249 lb 5.4 oz)     Rhythm: normal rate NSR  Chest: pacing wires: yes, chest tubes:yes, air leak no  CV: no murmur noted, Normal S1, S2  Lungs: mechanical breath sounds  Abd: The abdomen is soft without tenderness, guarding, mass, rebound or organomegaly.Bowel sounds are normal bowel sounds   Lower Extremities: 1+ edema  Sternal Incison: covered    Labs and Studies:  CBC:   Recent Labs     06/24/24  1411 06/24/24 2112 06/24/24 2130 06/25/24  0606   WBC 33.2* 20.7*  --  15.2*   HGB 12.4* 10.1*  --  10.1*   HCT 39.5* 31.9*  --  32.2*   MCV 90.2 90.6  --  89.2    168 168 See Reflexed IPF Result     BMP:   Recent Labs     06/24/24  1411 06/24/24  1443 06/24/24  1800 06/24/24 2112 06/25/24  0606     --   --  146* 143   K 3.5*  --   --  4.4 4.8     --   --  109* 110*   CO2 22  --   --  21 22   BUN 11  --   --  10 10   CREATININE 1.0   < > 0.9 1.0 0.9    < > = values in this interval not displayed.     PT/INR:   Recent Labs     06/24/24  1411 06/24/24 2130 06/25/24  0606   PROTIME 16.0* 16.1* 15.8*   INR 1.3 1.3 1.3     APTT:   Recent Labs     06/23/24 2336 06/24/24  1411 06/24/24  2130   APTT 47.5* 25.4 34.0     I/O:  I/O last 3 completed shifts:  In: 6576.6 [I.V.:4470.1; Blood:1000; NG/GT:80; IV Piggyback:1026.5]  Out: 4025 [Urine:2335; Emesis/NG output:80; Blood:600; Chest Tube:1010]    Scheduled Meds:    sodium chloride flush  5-40 mL IntraVENous 2 times per day    aspirin  81 mg Oral Daily    clopidogrel  75 mg Oral Daily    amiodarone  200 mg Oral TID

## 2024-06-25 NOTE — PROGRESS NOTES
Comprehensive Nutrition Assessment    Type and Reason for Visit:  Initial, RD Nutrition Re-Screen/LOS    Nutrition Recommendations/Plan:   Continue current diet.  Encourage intakes as tolerated and monitor need for oral supplements.  Will monitor labs, weights, and plan of care.     Malnutrition Assessment:  Malnutrition Status:  At risk for malnutrition (06/25/24 4367)    Context:  Acute Illness     Findings of the 6 clinical characteristics of malnutrition:  Energy Intake:  Mild decrease in energy intake   Weight Loss:  No significant weight loss     Body Fat Loss:  No significant body fat loss   Muscle Mass Loss:  No significant muscle mass loss  Fluid Accumulation:  Mild Generalized   Strength:  Not Performed    Nutrition Assessment:    Chart reviewed/pt seen for length of stay.  Admitted with c/o SOB and leg swelling.  PMH: CAD, CHF.  Pt s/p CABG yesterday.  Extubated this morning and has been started on an oral diet.  Per chart review, recorded PO intakes of 0-100% noted.  Labs reviewed.  Meds include: Glycolax, Senokot.    Nutrition Related Findings:    Labs/Meds reviewed.   Wound Type: Multiple, Surgical Incision, Wound Vac (to sternum)       Current Nutrition Intake & Therapies:    Average Meal Intake:  (Variable intakes per chart review)  Average Supplements Intake: None Ordered  ADULT DIET; Regular; 4 carb choices (60 gm/meal); Low Fat/Low Chol/High Fiber/KELLEN; No Added Salt (3-4 gm); 1200 ml  Additional Calorie Sources:  None    Anthropometric Measures:  Height: 168 cm (5' 6.14\")  Ideal Body Weight (IBW): 143 lbs (65 kg)    Admission Body Weight: 113.1 kg (249 lb 5.4 oz)  Current Body Weight: 116.9 kg (257 lb 11.5 oz), 180.2 % IBW. Weight Source: Bed Scale  Current BMI (kg/m2): 41.4  Usual Body Weight: 118 kg (260 lb 2 oz) (5/26/24 per chart review)  % Weight Change (Calculated): -0.9  Weight Adjustment For: No Adjustment                 BMI Categories: Obese Class 3 (BMI 40.0 or greater)    Estimated

## 2024-06-25 NOTE — PROGRESS NOTES
Cardiology Progress Note                     Date:   6/25/2024  Patient name: Jesús Min  Date of admission:  6/18/2024  3:00 PM  MRN:   [id number]  YOB: 1975  PCP: No primary care provider on file.    Reason for Admission:  NSTEMI, HFrEF    Subjective:       Patient is now post op day 1 after CABG x 3  He is intubated and on low dose epi, vaso and levophed  Post op EF was 20-25%  Remains in normal sinus rhythm with occasional pvc.     Scheduled Meds:   sodium chloride flush  5-40 mL IntraVENous 2 times per day    aspirin  81 mg Oral Daily    clopidogrel  75 mg Oral Daily    amiodarone  200 mg Oral TID    mupirocin   Each Nostril BID    polyethylene glycol  17 g Oral Daily    sennosides-docusate sodium  1 tablet Oral BID    metoprolol tartrate  12.5 mg Oral BID    atorvastatin  20 mg Oral Nightly    pantoprazole  40 mg Oral Daily    Or    pantoprazole (PROTONIX) 40 mg in sodium chloride (PF) 0.9 % 10 mL injection  40 mg IntraVENous Daily    vancomycin (VANCOCIN) IV  1,500 mg IntraVENous Q12H    insulin lispro  0-12 Units SubCUTAneous TID WC    amiodarone  150 mg IntraVENous Once       Continuous Infusions:   sodium chloride 50 mL/hr at 06/25/24 0805    sodium chloride 25 mL/hr at 06/25/24 0805    propofol 10 mcg/kg/min (06/25/24 0805)    norepinephrine 0.03 mcg/kg/min (06/25/24 0805)    EPINEPHrine 0.02 mcg/kg/min (06/25/24 0805)    VASOpressin 0.03 Units/min (06/25/24 0805)    insulin 2.5 Units/hr (06/25/24 0805)    dextrose      milrinone 0.375 mcg/kg/min (06/25/24 0805)    amiodarone 0.5 mg/min (06/25/24 0805)    dexmedeTOMIDine 0.4 mcg/kg/hr (06/25/24 0805)       Labs:     CBC:   Recent Labs     06/24/24  1411 06/24/24  2112 06/24/24  2130 06/25/24  0606   WBC 33.2* 20.7*  --  15.2*   HGB 12.4* 10.1*  --  10.1*    168 168 See Reflexed IPF Result     BMP:    Recent Labs     06/24/24  1411 06/24/24  1443 06/24/24  1800 06/24/24  2112 06/25/24  0606     --   --  146* 143  elevated RVSP, consistent with mild pulmonary hypertension. The estimated RVSP is 46 mmHg.    Pulmonic Valve: Mild regurgitation.    Image quality is adequate.    Coronary Angiography 6/17/24:     Severe Multivessel coronary artery disease    Severely impaired left ventricular function with estimated EF 20-25%.    Limited TTE 6/18/24    Left Ventricle: Severely reduced left ventricular systolic function with a visually estimated EF of 15 - 20%. EF by 2D Simpsons Biplane is 23%. Left ventricle is dilated. Severe global hypokinesis present.    Image quality is good.     Impression:   Acute coronary syndrome/NSTEMI  Severe multivessel coronary artery disease on [facility]  S/p CABG x 3 with LIMA-LAD, SVG-OM and SVG-RPDA ON 6/24/24  New onset heart failure with reduced ejection fraction  Severe ischemic cardiomyopathy, LVEF  ~ 20-25%, post op   Grade 3 diastolic dysfunction  Mild mitral and tricuspid regurgitation  Chronic active smoker        Plan:   Routine post op care per CTS   Wean off vasopressors as tolerated for MAP > 65  IV diuresis based on urine output and PCWP   Spontaneous breathing trial  Will continue to follow along      Makenzie Benavidez MD [facility]

## 2024-06-25 NOTE — PROGRESS NOTES
Reached out to Cardiology about pt having twice of 8 runs of Nonsustained Vtach,strips sent. Dr. Nilesh Hurtado ordered to give 2 gms of Magnesium

## 2024-06-25 NOTE — OP NOTE
[address] [address]                            OPERATIVE REPORT      PATIENT NAME: DANIEL ALFORD              : 1975  MED REC NO: [id number]                         ROOM: 1028  ACCOUNT NO: [id number]                       ADMIT DATE: 2024  PROVIDER: Ricardo James MD      DATE OF PROCEDURE:  2024    SURGEON:  Ricardo James MD    OTHER ASSISTANT:  Elayne Peñaloza, surgical technologist.    PREOPERATIVE DIAGNOSES:  Severe multivessel coronary artery disease, ultra low ejection fraction of 15%, severe stage IV congestive heart failure, severe volume overload, ischemic cardiomyopathy.    POSTOPERATIVE DIAGNOSES:  Severe multivessel coronary artery disease, ultra low ejection fraction of 15%, severe stage IV congestive heart failure, severe volume overload, ischemic cardiomyopathy.    PROCEDURES:  Median sternotomy; transesophageal echocardiography; endoscopic vein harvest; coronary artery bypass graft x3 with left internal mammary artery to left anterior descending, reverse saphenous vein graft to obtuse marginal, and reverse saphenous vein graft to the right posterior descending artery (chronically occluded vessel).    COMPLICATIONS:  None.    CONDITION:  Stable.    DISPOSITION:  To CVICU.    ESTIMATED BLOOD LOSS:  Not applicable.    ANESTHESIA:  General endotracheal.    INDICATIONS FOR SURGERY:  The patient is a 49-year-old gentleman who suffers from ischemic cardiomyopathy.  He presented to the hospital in severe exacerbation of congestive heart failure and was severely short of breath and volume overloaded.  He was admitted to the Cardiology Service and underwent cardiac catheterization revealing severe multivessel coronary artery disease including 100% occlusion of the RCA and severe blockages about the LAD and circumflex.  He was medically managed for congestive heart failure including diuresis of over  documented.  The operation began with the performance of a midline median sternotomy, through which I took down the left internal mammary artery while simultaneous vein harvest was performed.  Once all the conduit was taken, deemed appropriate, a pericardial well was created.  A preliminary dissection was performed to achieve aorta-right atrial cardiopulmonary bypass, which was achieved with excellent flows and drainage after appropriate heparinization.  I placed an ascending aortic cardioplegia needle in the ascending aorta.  A cross-clamp was applied and a dose of cold [place] solution was given to achieve adequate diastolic arrest.  Topical ice was applied.  Looking about the heart, the above-mentioned grafts were performed within the order of the reverse saphenous vein graft to the RPDA, then the reverse saphenous vein graft to the OM.  The LIMA was placed on the LAD and lastly connected the proximal portion of the reverse saphenous vein graft to the takeoff of the ascending aorta.  The heart was de-aired, the cross-clamp was removed, and there was the eventual return of normal sinus rhythm.  Ultimately, I was able to adjust his medications and decannulate him with guarded hemodynamics.  Hemostasis was achieved and verified and chest tubes were placed.  Ultimately, the remainder of the incisions were closed in the usual layered fashion, and he was transported to CVICU in stable condition.  Please include, there were no adequately trained or available residents to assist in this operation and the presence of Elayne Peñaloza was critical for not only the independent performance of the endoscopic vein harvest, but also the first assistant who is necessary to conduct such an operation          NATHALIA MOHAN MD      D:  06/25/2024 10:03:55     T:  06/25/2024 12:06:09     LULA/ZO  Job #:  [id number]     Doc#:  [id number]

## 2024-06-25 NOTE — PLAN OF CARE
Problem: Safety - Adult  Goal: Free from fall injury  6/25/2024 1539 by Yoselyn Snyder RN  Outcome: Progressing  6/25/2024 0412 by Heath Burt RN  Outcome: Progressing  Flowsheets (Taken 6/24/2024 2300)  Free From Fall Injury: Based on caregiver fall risk screen, instruct family/caregiver to ask for assistance with transferring infant if caregiver noted to have fall risk factors     Problem: Chronic Conditions and Co-morbidities  Goal: Patient's chronic conditions and co-morbidity symptoms are monitored and maintained or improved  6/25/2024 1539 by Yoselyn Snyder RN  Outcome: Progressing  6/25/2024 0412 by Heath Burt RN  Outcome: Progressing     Problem: Discharge Planning  Goal: Discharge to home or other facility with appropriate resources  6/25/2024 1539 by Yoselyn Snyder RN  Outcome: Progressing  6/25/2024 0412 by Heath Burt RN  Outcome: Progressing  Flowsheets (Taken 6/24/2024 2000)  Discharge to home or other facility with appropriate resources: Identify barriers to discharge with patient and caregiver     Problem: ABCDS Injury Assessment  Goal: Absence of physical injury  6/25/2024 1539 by Yoselyn Snyder RN  Outcome: Progressing  Flowsheets (Taken 6/25/2024 0828)  Absence of Physical Injury: Implement safety measures based on patient assessment  6/25/2024 0412 by Heath Burt RN  Outcome: Progressing  Flowsheets (Taken 6/24/2024 2300)  Absence of Physical Injury: Implement safety measures based on patient assessment     Problem: Respiratory - Adult  Goal: Achieves optimal ventilation and oxygenation  6/25/2024 1539 by Yoselyn Snyder RN  Outcome: Progressing  6/25/2024 0412 by Heath Burt RN  Outcome: Progressing     Problem: Pain  Goal: Verbalizes/displays adequate comfort level or baseline comfort level  6/25/2024 1539 by Yoselyn Snyder RN  Outcome: Progressing  6/25/2024 0412 by Heath Burt RN  Outcome: Progressing  Flowsheets  Taken 6/25/2024 0400  Verbalizes/displays adequate

## 2024-06-25 NOTE — PROGRESS NOTES
Order obtained for extubation.  SpO2 of 95 on 45% FiO2.   Patient extubated and placed on 6 liters/min via nasal cannula.   Post extubation SpO2 is 96% with HR  75 bpm and RR 20 breaths/min.    Patient had strong cough that was productive of white sputum.  Extubation Well tolerated by patient..   Breath Sounds: clear t/o    MARIANA BAUTISTA RCP   10:00 AM

## 2024-06-25 NOTE — PLAN OF CARE
Problem: Respiratory - Adult  Goal: Achieves optimal ventilation and oxygenation  6/24/2024 2113 by Marisol Horowitz RCP  Outcome: Progressing     Problem: OXYGENATION/RESPIRATORY FUNCTION  Goal: Patient will maintain patent airway  Outcome: Ongoing  Goal: Patient will achieve/maintain normal respiratory rate/effort  Respiratory rate and effort will be within normal limits for the patient  Outcome: Ongoing    Problem: MECHANICAL VENTILATION  Goal: Patient will maintain patent airway  Outcome: Ongoing  Goal: Oral health is maintained or improved  Outcome: Ongoing  Goal: ET tube will be managed safely  Outcome: Ongoing  Goal: Ability to express needs and understand communication  Outcome: Ongoing  Goal: Mobility/activity is maintained at optimum level for patient  Outcome: Ongoing    Problem: ASPIRATION PRECAUTIONS  Goal: Patient’s risk of aspiration is minimized  Outcome: Ongoing    Problem: SKIN INTEGRITY  Goal: Skin integrity is maintained or improved  Outcome: Ongoing

## 2024-06-25 NOTE — PROGRESS NOTES
Occupational Therapy    [facility]  Occupational Therapy Not Seen Note    DATE: 2024    NAME: Jesús Min  MRN: [id number]   : 1975      Patient not seen this date for Occupational Therapy due to:    Patient is not appropriate for active participation in OT evaluation/treatment at this time d/t remaining intubated at this time.     Electronically signed by LANIE Vera/L on 2024 at 3:53 PM

## 2024-06-25 NOTE — PLAN OF CARE
Problem: Safety - Adult  Goal: Free from fall injury  6/25/2024 0412 by Heath Burt RN  Outcome: Progressing  Flowsheets (Taken 6/24/2024 2300)  Free From Fall Injury: Based on caregiver fall risk screen, instruct family/caregiver to ask for assistance with transferring infant if caregiver noted to have fall risk factors  6/24/2024 1845 by Ramon West RN  Outcome: Progressing     Problem: Chronic Conditions and Co-morbidities  Goal: Patient's chronic conditions and co-morbidity symptoms are monitored and maintained or improved  6/25/2024 0412 by Heath Burt RN  Outcome: Progressing  6/24/2024 1845 by Ramon West RN  Outcome: Progressing     Problem: Discharge Planning  Goal: Discharge to home or other facility with appropriate resources  6/25/2024 0412 by Heath Burt RN  Outcome: Progressing  Flowsheets (Taken 6/24/2024 2000)  Discharge to home or other facility with appropriate resources: Identify barriers to discharge with patient and caregiver  6/24/2024 1845 by Ramon West RN  Outcome: Progressing     Problem: ABCDS Injury Assessment  Goal: Absence of physical injury  6/25/2024 0412 by Heath Burt RN  Outcome: Progressing  Flowsheets (Taken 6/24/2024 2300)  Absence of Physical Injury: Implement safety measures based on patient assessment  6/24/2024 1845 by Ramon West RN  Outcome: Progressing     Problem: Respiratory - Adult  Goal: Achieves optimal ventilation and oxygenation  6/25/2024 0412 by Heath Burt RN  Outcome: Progressing  6/24/2024 2113 by Marisol Horowitz RCP  Outcome: Progressing  Flowsheets (Taken 6/24/2024 2000 by Heath Burt RN)  Achieves optimal ventilation and oxygenation: Assess for changes in respiratory status  6/24/2024 1845 by Ramon West RN  Outcome: Progressing     Problem: Safety - Medical Restraint  Goal: Remains free of injury from restraints (Restraint for Interference with Medical Device)  Description: INTERVENTIONS:  1. Determine that other,

## 2024-06-26 ENCOUNTER — APPOINTMENT (OUTPATIENT)
Dept: GENERAL RADIOLOGY | Age: 49
End: 2024-06-26
Attending: STUDENT IN AN ORGANIZED HEALTH CARE EDUCATION/TRAINING PROGRAM
Payer: COMMERCIAL

## 2024-06-26 PROBLEM — I50.20 HFREF (HEART FAILURE WITH REDUCED EJECTION FRACTION) (HCC): Status: ACTIVE | Noted: 2024-06-26

## 2024-06-26 PROBLEM — Z95.1 S/P CABG X 3: Status: ACTIVE | Noted: 2024-06-26

## 2024-06-26 LAB
ANION GAP SERPL CALCULATED.3IONS-SCNC: 8 MMOL/L (ref 9–16)
BUN SERPL-MCNC: 11 MG/DL (ref 6–20)
CA-I BLD-SCNC: 1.18 MMOL/L (ref 1.13–1.33)
CALCIUM SERPL-MCNC: 8.9 MG/DL (ref 8.6–10.4)
CHLORIDE SERPL-SCNC: 105 MMOL/L (ref 98–107)
CO2 SERPL-SCNC: 24 MMOL/L (ref 20–31)
CREAT SERPL-MCNC: 0.8 MG/DL (ref 0.7–1.2)
ERYTHROCYTE [DISTWIDTH] IN BLOOD BY AUTOMATED COUNT: 15.7 % (ref 11.8–14.4)
GFR, ESTIMATED: >90 ML/MIN/1.73M2
GLUCOSE BLD-MCNC: 100 MG/DL (ref 75–110)
GLUCOSE BLD-MCNC: 107 MG/DL (ref 75–110)
GLUCOSE BLD-MCNC: 126 MG/DL (ref 75–110)
GLUCOSE BLD-MCNC: 129 MG/DL (ref 75–110)
GLUCOSE SERPL-MCNC: 128 MG/DL (ref 74–99)
HBCO, MIXED, EXTENDED: 1.6 % (ref 0–5)
HCT VFR BLD AUTO: 30.2 % (ref 40.7–50.3)
HEMOGLOBIN, MIXED, EXTENDED: 10.2 G/DL (ref 12–18)
HGB BLD-MCNC: 9.5 G/DL (ref 13–17)
INR PPP: 1.3
MAGNESIUM SERPL-MCNC: 2.3 MG/DL (ref 1.6–2.6)
MCH RBC QN AUTO: 28.1 PG (ref 25.2–33.5)
MCHC RBC AUTO-ENTMCNC: 31.5 G/DL (ref 28.4–34.8)
MCV RBC AUTO: 89.3 FL (ref 82.6–102.9)
METHB, MIXED, EXTENDED: 0.9 % (ref 0–1.5)
NRBC BLD-RTO: 0 PER 100 WBC
O2 CONTENT, MIXED, EXTENDED: 8 VOL % (ref 12–20)
OXYGEN STATUS: ABNORMAL
PLATELET # BLD AUTO: ABNORMAL K/UL (ref 138–453)
PLATELET, FLUORESCENCE: 132 K/UL (ref 138–453)
PLATELETS.RETICULATED NFR BLD AUTO: 6.1 % (ref 1.1–10.3)
POTASSIUM SERPL-SCNC: 4.6 MMOL/L (ref 3.7–5.3)
PROTHROMBIN TIME: 16 SEC (ref 11.7–14.9)
RBC # BLD AUTO: 3.38 M/UL (ref 4.21–5.77)
SAO2 % BLDMV: 55.9 % (ref 60–80)
SODIUM SERPL-SCNC: 137 MMOL/L (ref 136–145)
WBC OTHER # BLD: 17 K/UL (ref 3.5–11.3)

## 2024-06-26 PROCEDURE — 6360000002 HC RX W HCPCS: Performed by: PHYSICIAN ASSISTANT

## 2024-06-26 PROCEDURE — 85055 RETICULATED PLATELET ASSAY: CPT

## 2024-06-26 PROCEDURE — 83735 ASSAY OF MAGNESIUM: CPT

## 2024-06-26 PROCEDURE — 82330 ASSAY OF CALCIUM: CPT

## 2024-06-26 PROCEDURE — 6370000000 HC RX 637 (ALT 250 FOR IP): Performed by: NURSE PRACTITIONER

## 2024-06-26 PROCEDURE — 99233 SBSQ HOSP IP/OBS HIGH 50: CPT | Performed by: INTERNAL MEDICINE

## 2024-06-26 PROCEDURE — 82375 ASSAY CARBOXYHB QUANT: CPT

## 2024-06-26 PROCEDURE — 6360000002 HC RX W HCPCS: Performed by: NURSE PRACTITIONER

## 2024-06-26 PROCEDURE — 94761 N-INVAS EAR/PLS OXIMETRY MLT: CPT

## 2024-06-26 PROCEDURE — 85610 PROTHROMBIN TIME: CPT

## 2024-06-26 PROCEDURE — 71045 X-RAY EXAM CHEST 1 VIEW: CPT

## 2024-06-26 PROCEDURE — 80048 BASIC METABOLIC PNL TOTAL CA: CPT

## 2024-06-26 PROCEDURE — 82805 BLOOD GASES W/O2 SATURATION: CPT

## 2024-06-26 PROCEDURE — 85027 COMPLETE CBC AUTOMATED: CPT

## 2024-06-26 PROCEDURE — 83050 HGB METHEMOGLOBIN QUAN: CPT

## 2024-06-26 PROCEDURE — 2100000001 HC CVICU R&B

## 2024-06-26 PROCEDURE — 36415 COLL VENOUS BLD VENIPUNCTURE: CPT

## 2024-06-26 PROCEDURE — 2700000000 HC OXYGEN THERAPY PER DAY

## 2024-06-26 PROCEDURE — 82947 ASSAY GLUCOSE BLOOD QUANT: CPT

## 2024-06-26 PROCEDURE — 2580000003 HC RX 258: Performed by: NURSE PRACTITIONER

## 2024-06-26 PROCEDURE — 2500000003 HC RX 250 WO HCPCS: Performed by: NURSE PRACTITIONER

## 2024-06-26 RX ORDER — ASPIRIN 81 MG/1
81 TABLET ORAL DAILY
Status: DISCONTINUED | OUTPATIENT
Start: 2024-06-27 | End: 2024-07-01 | Stop reason: HOSPADM

## 2024-06-26 RX ORDER — FUROSEMIDE 10 MG/ML
20 INJECTION INTRAMUSCULAR; INTRAVENOUS ONCE
Status: COMPLETED | OUTPATIENT
Start: 2024-06-26 | End: 2024-06-26

## 2024-06-26 RX ADMIN — METOPROLOL TARTRATE 12.5 MG: 25 TABLET, FILM COATED ORAL at 09:32

## 2024-06-26 RX ADMIN — MILRINONE LACTATE IN DEXTROSE 0.38 MCG/KG/MIN: 200 INJECTION, SOLUTION INTRAVENOUS at 08:17

## 2024-06-26 RX ADMIN — PANTOPRAZOLE SODIUM 40 MG: 40 TABLET, DELAYED RELEASE ORAL at 09:33

## 2024-06-26 RX ADMIN — SODIUM CHLORIDE: 9 INJECTION, SOLUTION INTRAVENOUS at 05:32

## 2024-06-26 RX ADMIN — SENNOSIDES AND DOCUSATE SODIUM 1 TABLET: 50; 8.6 TABLET ORAL at 09:32

## 2024-06-26 RX ADMIN — OXYCODONE 10 MG: 5 TABLET ORAL at 02:34

## 2024-06-26 RX ADMIN — SODIUM CHLORIDE, PRESERVATIVE FREE 10 ML: 5 INJECTION INTRAVENOUS at 21:20

## 2024-06-26 RX ADMIN — FUROSEMIDE 20 MG: 10 INJECTION, SOLUTION INTRAMUSCULAR; INTRAVENOUS at 09:33

## 2024-06-26 RX ADMIN — MILRINONE LACTATE IN DEXTROSE 0.38 MCG/KG/MIN: 200 INJECTION, SOLUTION INTRAVENOUS at 00:38

## 2024-06-26 RX ADMIN — CLOPIDOGREL BISULFATE 75 MG: 75 TABLET ORAL at 09:32

## 2024-06-26 RX ADMIN — MUPIROCIN: 20 OINTMENT TOPICAL at 21:17

## 2024-06-26 RX ADMIN — ATORVASTATIN CALCIUM 20 MG: 20 TABLET, FILM COATED ORAL at 21:16

## 2024-06-26 RX ADMIN — SENNOSIDES AND DOCUSATE SODIUM 1 TABLET: 50; 8.6 TABLET ORAL at 21:16

## 2024-06-26 RX ADMIN — AMIODARONE HYDROCHLORIDE 200 MG: 200 TABLET ORAL at 21:16

## 2024-06-26 RX ADMIN — Medication 0.5 MG/MIN: at 02:38

## 2024-06-26 RX ADMIN — AMIODARONE HYDROCHLORIDE 200 MG: 200 TABLET ORAL at 09:31

## 2024-06-26 RX ADMIN — METOPROLOL TARTRATE 12.5 MG: 25 TABLET, FILM COATED ORAL at 21:16

## 2024-06-26 RX ADMIN — POLYETHYLENE GLYCOL 3350 17 G: 17 POWDER, FOR SOLUTION ORAL at 09:37

## 2024-06-26 RX ADMIN — OXYCODONE 10 MG: 5 TABLET ORAL at 09:31

## 2024-06-26 RX ADMIN — FENTANYL CITRATE 25 MCG: 50 INJECTION, SOLUTION INTRAMUSCULAR; INTRAVENOUS at 05:02

## 2024-06-26 RX ADMIN — OXYCODONE 5 MG: 5 TABLET ORAL at 21:16

## 2024-06-26 RX ADMIN — OXYCODONE 10 MG: 5 TABLET ORAL at 13:04

## 2024-06-26 RX ADMIN — AMIODARONE HYDROCHLORIDE 200 MG: 200 TABLET ORAL at 13:04

## 2024-06-26 RX ADMIN — ASPIRIN 81 MG 81 MG: 81 TABLET ORAL at 09:32

## 2024-06-26 ASSESSMENT — PAIN SCALES - GENERAL
PAINLEVEL_OUTOF10: 5
PAINLEVEL_OUTOF10: 6
PAINLEVEL_OUTOF10: 4
PAINLEVEL_OUTOF10: 6
PAINLEVEL_OUTOF10: 5
PAINLEVEL_OUTOF10: 7
PAINLEVEL_OUTOF10: 5

## 2024-06-26 ASSESSMENT — PAIN DESCRIPTION - LOCATION
LOCATION: CHEST;STERNUM
LOCATION: STERNUM
LOCATION: STERNUM;CHEST
LOCATION: CHEST;STERNUM

## 2024-06-26 ASSESSMENT — PAIN DESCRIPTION - ORIENTATION: ORIENTATION: OTHER (COMMENT)

## 2024-06-26 ASSESSMENT — PAIN DESCRIPTION - PAIN TYPE
TYPE: SURGICAL PAIN

## 2024-06-26 ASSESSMENT — PAIN DESCRIPTION - DESCRIPTORS
DESCRIPTORS: ACHING;SORE
DESCRIPTORS: ACHING;SORE
DESCRIPTORS: ACHING;DISCOMFORT
DESCRIPTORS: ACHING;DISCOMFORT

## 2024-06-26 NOTE — CARE COORDINATION
Transitional planning: Met w/ patient about discharge plan and plans to return home independent. States wife can transport home. States received [employer] life vest and wife took home, but bringing it back tomorrow. Agrees to CR referral to [facility].  Patient has a qualifying diagnosis for cardiac rehabilitation.     Education provided to patient regarding the health benefits of participating in a cardiac rehabilitation program. Handout provided with an overview of cardiac rehabilitation from the [employer].     Patient agreeable: Yes    Freedom of choice provided.     Referral made to :   [x] [facility]  [] [facility]  [] [facility]  [] [facility]  [] [facility]  [] Other

## 2024-06-26 NOTE — PLAN OF CARE
Problem: Safety - Adult  Goal: Free from fall injury  6/26/2024 1353 by Jorge Putnam RN  Outcome: Progressing  6/26/2024 0246 by Heath Burt RN  Outcome: Progressing  Flowsheets (Taken 6/26/2024 0200)  Free From Fall Injury: Based on caregiver fall risk screen, instruct family/caregiver to ask for assistance with transferring infant if caregiver noted to have fall risk factors     Problem: Chronic Conditions and Co-morbidities  Goal: Patient's chronic conditions and co-morbidity symptoms are monitored and maintained or improved  6/26/2024 1353 by Jorge Putnam RN  Outcome: Progressing  6/26/2024 0246 by Heath Burt RN  Outcome: Progressing  Flowsheets (Taken 6/25/2024 2000)  Care Plan - Patient's Chronic Conditions and Co-Morbidity Symptoms are Monitored and Maintained or Improved: Monitor and assess patient's chronic conditions and comorbid symptoms for stability, deterioration, or improvement     Problem: Discharge Planning  Goal: Discharge to home or other facility with appropriate resources  6/26/2024 1353 by Jorge Putnam RN  Outcome: Progressing  6/26/2024 0246 by Heath Burt RN  Outcome: Progressing  Flowsheets (Taken 6/25/2024 2000)  Discharge to home or other facility with appropriate resources: Identify barriers to discharge with patient and caregiver     Problem: ABCDS Injury Assessment  Goal: Absence of physical injury  6/26/2024 1353 by Jorge Putnam RN  Outcome: Progressing  6/26/2024 0246 by Heath Burt RN  Outcome: Progressing  Flowsheets (Taken 6/26/2024 0200)  Absence of Physical Injury: Implement safety measures based on patient assessment     Problem: Respiratory - Adult  Goal: Achieves optimal ventilation and oxygenation  6/26/2024 1353 by Jorge Putnam RN  Outcome: Progressing  6/26/2024 0246 by Heath Burt RN  Outcome: Progressing     Problem: Pain  Goal: Verbalizes/displays adequate comfort level or baseline comfort level  6/26/2024 1353 by [place]

## 2024-06-26 NOTE — PROGRESS NOTES
Cardiology Progress Note                     Date:   6/26/2024  Patient name: Jesús Min  Date of admission:  6/18/2024  3:00 PM  MRN:   [id number]  YOB: 1975  PCP: No primary care provider on file.    Reason for Admission:  NSTEMI, HFrEF    Subjective:       Patient is now post op day 2 after CABG x 3  He is extubated, sitting up in the chair, in no apparent respiratory distress on high flow nasal cannula oxygen  Hemodynamic parameters much improved, currently on low-dose milrinone, MAP ~ 80, overnight started on Amio infusion likely due to frequent PVCs and NSVT , maintaining normal sinus rhythm   PA pressures mildly elevated  CVP normal  Cardiac output/index normal    Scheduled Meds:   aspirin  81 mg Oral Daily    sodium chloride flush  5-40 mL IntraVENous 2 times per day    clopidogrel  75 mg Oral Daily    amiodarone  200 mg Oral TID    mupirocin   Each Nostril BID    polyethylene glycol  17 g Oral Daily    sennosides-docusate sodium  1 tablet Oral BID    metoprolol tartrate  12.5 mg Oral BID    atorvastatin  20 mg Oral Nightly    pantoprazole  40 mg Oral Daily    Or    pantoprazole (PROTONIX) 40 mg in sodium chloride (PF) 0.9 % 10 mL injection  40 mg IntraVENous Daily    insulin lispro  0-12 Units SubCUTAneous TID WC    amiodarone  150 mg IntraVENous Once       Continuous Infusions:   sodium chloride Stopped (06/25/24 1445)    sodium chloride 10 mL/hr at 06/26/24 0633    propofol Stopped (06/25/24 0834)    norepinephrine Stopped (06/25/24 1738)    EPINEPHrine Stopped (06/25/24 1314)    VASOpressin Stopped (06/25/24 2002)    insulin Stopped (06/25/24 1354)    dextrose      milrinone 0.375 mcg/kg/min (06/26/24 0817)    amiodarone 0.5 mg/min (06/26/24 0633)    dexmedeTOMIDine Stopped (06/25/24 1116)       Labs:     CBC:   Recent Labs     06/24/24  2112 06/24/24  2130 06/25/24  0606 06/26/24  0504   WBC 20.7*  --  15.2* 17.0*   HGB 10.1*  --  10.1* 9.5*    168 See Reflexed IPF  Result See Reflexed IPF Result     BMP:    Recent Labs     06/24/24 2112 06/25/24  0606 06/26/24  0504   * 143 137   K 4.4 4.8 4.6   * 110* 105   CO2 21 22 24   BUN 10 10 11   CREATININE 1.0 0.9 0.8   GLUCOSE 194* 104* 128*     Hepatic:   No results for input(s): \"AST\", \"ALT\", \"BILITOT\", \"ALKPHOS\" in the last 72 hours.    Invalid input(s): \"ALB\"    Troponin: No results for input(s): \"TROPONINI\" in the last 72 hours.  BNP: No results for input(s): \"BNP\" in the last 72 hours.  Lipids: No results for input(s): \"CHOL\", \"HDL\" in the last 72 hours.    Invalid input(s): \"LDLCALCU\"  INR:   Recent Labs     06/24/24 2130 06/25/24  0606 06/26/24  0504   INR 1.3 1.3 1.3           Objective:     Vitals: BP (!) 109/58   Pulse 83   Temp 99 °F (37.2 °C) (Oral)   Resp 21   Ht 1.68 m (5' 6.14\")   Wt 116.6 kg (257 lb 0.9 oz)   SpO2 92%   BMI 41.31 kg/m²     General appearance: Extubated, in no apparent distress on high flow nasal cannula oxygen  HEENT: Head: Normocephalic, no lesions, without obvious abnormality  Neck: no JVD  Lungs: Coarse breath sounds bilaterally at the bases with reduced entry.  Heart: regular rate and rhythm, S1, S2 normal,  Sternal incision with dressing  Abdomen: soft, non-tender; bowel sounds normal  Extremities: LE edema has resolved  Neurologic: Mental status: Intubated and sedated.  Neuro not done.      Cardiac testing:     EKG  sinus tachycardia, IVCD               Echocardiogram 6/16/2024:    Left Ventricle: Severely reduced left ventricular systolic function with a visually estimated EF of 15 - 20%. Left ventricle is mildly dilated. Increased wall thickness. Findings consistent with mild eccentric hypertrophy. Severe global hypokinesis present, with minor regional variation. Grade III diastolic dysfunction with increased LAP.    Right Ventricle: Right ventricle size is normal. Moderately reduced systolic function.    Aortic Valve: Mild regurgitation.    Mitral Valve: Mildly thickened

## 2024-06-26 NOTE — PROGRESS NOTES
Occupational Therapy    [facility]  Occupational Therapy Not Seen Note    DATE: 2024    NAME: Jesús Min  MRN: [id number]   : 1975      Patient not seen this date for Occupational Therapy due to:    Patient is not appropriate for active participation in OT evaluation/treatment at this time d/t celena remaining in place.     Electronically signed by Natasha Kelsey OT on 2024 at 2:09 PM

## 2024-06-26 NOTE — PROGRESS NOTES
Physical Therapy        Physical Therapy Cancel Note      DATE: 2024    NAME: Jesús Min  MRN: [id number]   : 1975      Patient not seen this date for Physical Therapy due to:    Other: Disccussed with RN, celena alexander  in place, possible removal later. ck pt as able      Electronically signed by Nhan Morrison PT on 2024 at 12:44 PM

## 2024-06-26 NOTE — PLAN OF CARE
Problem: Respiratory - Adult  Goal: Achieves optimal ventilation and oxygenation  6/25/2024 2024 by Monica Cisneros RCP  Outcome: Progressing   PROVIDE ADEQUATE OXYGENATION WITH ACCEPTABLE SP02/ABG'S    [x]  IDENTIFY APPROPRIATE OXYGEN THERAPY  [x]   MONITOR SP02/ABG'S AS NEEDED   [x]   PATIENT EDUCATION AS NEEDED

## 2024-06-26 NOTE — PROGRESS NOTES
[facility] Cardiothoracic Surgery  Daily Progress Note          Surgeon:  Dr. Renee       Subjective:  Mr. Min is a 49 y.o. year-old male status post CABG X 3 (LIMA to LAD, RSVG1 to OM and RSVG2 to RPDA -->small, chronically occluded vessel) on 6/24/24.     Patient was seen and examined at bedside this morning without any complaints.      Vital Signs: BP (!) 109/58   Pulse 83   Temp 99 °F (37.2 °C) (Oral)   Resp 26   Ht 1.68 m (5' 6.14\")   Wt 116.6 kg (257 lb 0.9 oz)   SpO2 94%   BMI 41.31 kg/m²  O2 Flow Rate (L/min): 15 L/min   Admit Weight: Weight - Scale: 113.1 kg (249 lb 5.4 oz)   WEIGHTWeight - Scale: 116.6 kg (257 lb 0.9 oz)     I/O's:  I/O last 3 completed shifts:  In: 4577.5 [P.O.:200; I.V.:3278.4; NG/GT:80; IV Piggyback:1019.2]  Out: 2780 [Urine:1780; Emesis/NG output:80; Chest Tube:920]    Data:    CBC:   Recent Labs     06/24/24 2112 06/24/24 2130 06/25/24 0606 06/26/24  0504   WBC 20.7*  --  15.2* 17.0*   HGB 10.1*  --  10.1* 9.5*   HCT 31.9*  --  32.2* 30.2*   MCV 90.6  --  89.2 89.3    168 See Reflexed IPF Result See Reflexed IPF Result     BMP:   Recent Labs     06/24/24 2112 06/25/24 0606 06/26/24  0504   * 143 137   K 4.4 4.8 4.6   * 110* 105   CO2 21 22 24   BUN 10 10 11   CREATININE 1.0 0.9 0.8     PT/INR:   Recent Labs     06/24/24 2130 06/25/24 0606 06/26/24  0504   PROTIME 16.1* 15.8* 16.0*   INR 1.3 1.3 1.3     APTT:   Recent Labs     06/23/24  2336 06/24/24  1411 06/24/24  2130   APTT 47.5* 25.4 34.0       Chest X-Ray: 6/26/24  EXAMINATION:  ONE XRAY VIEW OF THE CHEST     6/26/2024 5:55 am     COMPARISON:  06/25/2024     HISTORY:  ORDERING SYSTEM PROVIDED HISTORY: Post op open heart surgery  TECHNOLOGIST PROVIDED HISTORY:  Post op open heart surgery     FINDINGS:  Removal of ET tube and enteric tube since prior examination.  Left subclavian  approach [place]-Buster catheter is stable with tip in region of the main  pulmonary artery.  Right  Vtach. Amio gtt was started.     Pre-op EF 15%  Post-op EF ~ 20-25%    No issues or events noted with the patient overnight.    - Continue current care and meds  - Follow-up labs, CXR   - Decrease Primacor gtt to 0.1 mcg/kg/min. If CO remains > 4.0 and CI > 2.0 then d/c Primacor gtt. If CO > 4.0 and CI > 2.0 after Primacor gtt has been turned off x 2 hours then d/c swan   - d/c A-line  - d/c Amio gtt and continue Amio 200  mg PO TID as per Dr. Benavidez   - DVT prophylaxis with SCD's  - Diuresis with Lasix 20 mg IV x 1 dose today   - B-blocker to begin when Primacor is d/c'd and hemodynamics are stable   - Will need a LifeVest on discharge   - Replace electrolytes per protocol   - Strict sternal precautions   - GI Prophylaxis  - Cardiac diet  - OOB to chair - Ambulation with PT 3x daily  - Incentive Spirometry / Pulmonary hygiene  - Continue CT's to LWS  - Pain management PRN  - Further recommendations as per Dr. Renee      The above recommendations including medications and orders were discussed and agreed upon with Dr. Thelma Smith MBA, PA-C

## 2024-06-26 NOTE — PLAN OF CARE
Problem: Respiratory - Adult  Goal: Achieves optimal ventilation and oxygenation  6/26/2024 1943 by Monica Cisneros RCP  Outcome: Progressing   PROVIDE ADEQUATE OXYGENATION WITH ACCEPTABLE SP02/ABG'S    [x]  IDENTIFY APPROPRIATE OXYGEN THERAPY  [x]   MONITOR SP02/ABG'S AS NEEDED   [x]   PATIENT EDUCATION AS NEEDED

## 2024-06-27 ENCOUNTER — APPOINTMENT (OUTPATIENT)
Dept: GENERAL RADIOLOGY | Age: 49
End: 2024-06-27
Attending: STUDENT IN AN ORGANIZED HEALTH CARE EDUCATION/TRAINING PROGRAM
Payer: COMMERCIAL

## 2024-06-27 LAB
ANION GAP SERPL CALCULATED.3IONS-SCNC: 13 MMOL/L (ref 9–16)
BUN SERPL-MCNC: 14 MG/DL (ref 6–20)
CALCIUM SERPL-MCNC: 8.8 MG/DL (ref 8.6–10.4)
CHLORIDE SERPL-SCNC: 104 MMOL/L (ref 98–107)
CO2 SERPL-SCNC: 23 MMOL/L (ref 20–31)
CREAT SERPL-MCNC: 0.9 MG/DL (ref 0.7–1.2)
ERYTHROCYTE [DISTWIDTH] IN BLOOD BY AUTOMATED COUNT: 15.4 % (ref 11.8–14.4)
GFR, ESTIMATED: >90 ML/MIN/1.73M2
GLUCOSE BLD-MCNC: 107 MG/DL (ref 75–110)
GLUCOSE BLD-MCNC: 121 MG/DL (ref 75–110)
GLUCOSE BLD-MCNC: 133 MG/DL (ref 75–110)
GLUCOSE BLD-MCNC: 95 MG/DL (ref 75–110)
GLUCOSE SERPL-MCNC: 116 MG/DL (ref 74–99)
HCT VFR BLD AUTO: 34.6 % (ref 40.7–50.3)
HGB BLD-MCNC: 10.6 G/DL (ref 13–17)
MAGNESIUM SERPL-MCNC: 2.2 MG/DL (ref 1.6–2.6)
MCH RBC QN AUTO: 28.3 PG (ref 25.2–33.5)
MCHC RBC AUTO-ENTMCNC: 30.6 G/DL (ref 28.4–34.8)
MCV RBC AUTO: 92.5 FL (ref 82.6–102.9)
NRBC BLD-RTO: 0.2 PER 100 WBC
PLATELET # BLD AUTO: 167 K/UL (ref 138–453)
PMV BLD AUTO: 10.9 FL (ref 8.1–13.5)
POTASSIUM SERPL-SCNC: 4 MMOL/L (ref 3.7–5.3)
RBC # BLD AUTO: 3.74 M/UL (ref 4.21–5.77)
SODIUM SERPL-SCNC: 140 MMOL/L (ref 136–145)
WBC OTHER # BLD: 16.3 K/UL (ref 3.5–11.3)

## 2024-06-27 PROCEDURE — 6360000002 HC RX W HCPCS: Performed by: PHYSICIAN ASSISTANT

## 2024-06-27 PROCEDURE — 97530 THERAPEUTIC ACTIVITIES: CPT

## 2024-06-27 PROCEDURE — 2100000001 HC CVICU R&B

## 2024-06-27 PROCEDURE — 36415 COLL VENOUS BLD VENIPUNCTURE: CPT

## 2024-06-27 PROCEDURE — 99233 SBSQ HOSP IP/OBS HIGH 50: CPT | Performed by: INTERNAL MEDICINE

## 2024-06-27 PROCEDURE — 85027 COMPLETE CBC AUTOMATED: CPT

## 2024-06-27 PROCEDURE — 97535 SELF CARE MNGMENT TRAINING: CPT

## 2024-06-27 PROCEDURE — 71045 X-RAY EXAM CHEST 1 VIEW: CPT

## 2024-06-27 PROCEDURE — 6370000000 HC RX 637 (ALT 250 FOR IP): Performed by: THORACIC SURGERY (CARDIOTHORACIC VASCULAR SURGERY)

## 2024-06-27 PROCEDURE — 97162 PT EVAL MOD COMPLEX 30 MIN: CPT

## 2024-06-27 PROCEDURE — 6360000002 HC RX W HCPCS: Performed by: INTERNAL MEDICINE

## 2024-06-27 PROCEDURE — 2580000003 HC RX 258: Performed by: NURSE PRACTITIONER

## 2024-06-27 PROCEDURE — 6370000000 HC RX 637 (ALT 250 FOR IP): Performed by: NURSE PRACTITIONER

## 2024-06-27 PROCEDURE — 80048 BASIC METABOLIC PNL TOTAL CA: CPT

## 2024-06-27 PROCEDURE — 83735 ASSAY OF MAGNESIUM: CPT

## 2024-06-27 PROCEDURE — 97166 OT EVAL MOD COMPLEX 45 MIN: CPT

## 2024-06-27 PROCEDURE — 82947 ASSAY GLUCOSE BLOOD QUANT: CPT

## 2024-06-27 PROCEDURE — 97116 GAIT TRAINING THERAPY: CPT

## 2024-06-27 PROCEDURE — 6370000000 HC RX 637 (ALT 250 FOR IP): Performed by: PHYSICIAN ASSISTANT

## 2024-06-27 RX ORDER — POTASSIUM CHLORIDE 20 MEQ/1
20 TABLET, EXTENDED RELEASE ORAL ONCE
Status: COMPLETED | OUTPATIENT
Start: 2024-06-27 | End: 2024-06-27

## 2024-06-27 RX ORDER — FUROSEMIDE 10 MG/ML
20 INJECTION INTRAMUSCULAR; INTRAVENOUS 2 TIMES DAILY
Status: COMPLETED | OUTPATIENT
Start: 2024-06-27 | End: 2024-06-27

## 2024-06-27 RX ORDER — FUROSEMIDE 10 MG/ML
20 INJECTION INTRAMUSCULAR; INTRAVENOUS ONCE
Status: COMPLETED | OUTPATIENT
Start: 2024-06-27 | End: 2024-06-27

## 2024-06-27 RX ORDER — POTASSIUM CHLORIDE 20 MEQ/1
20 TABLET, EXTENDED RELEASE ORAL 2 TIMES DAILY
Status: ACTIVE | OUTPATIENT
Start: 2024-06-27 | End: 2024-06-28

## 2024-06-27 RX ADMIN — ATORVASTATIN CALCIUM 20 MG: 20 TABLET, FILM COATED ORAL at 20:01

## 2024-06-27 RX ADMIN — ASPIRIN 81 MG: 81 TABLET, COATED ORAL at 10:22

## 2024-06-27 RX ADMIN — SENNOSIDES AND DOCUSATE SODIUM 1 TABLET: 50; 8.6 TABLET ORAL at 10:21

## 2024-06-27 RX ADMIN — SODIUM CHLORIDE, PRESERVATIVE FREE 10 ML: 5 INJECTION INTRAVENOUS at 20:03

## 2024-06-27 RX ADMIN — SODIUM CHLORIDE, PRESERVATIVE FREE 10 ML: 5 INJECTION INTRAVENOUS at 10:20

## 2024-06-27 RX ADMIN — CLOPIDOGREL BISULFATE 75 MG: 75 TABLET ORAL at 10:21

## 2024-06-27 RX ADMIN — OXYCODONE 10 MG: 5 TABLET ORAL at 02:43

## 2024-06-27 RX ADMIN — MUPIROCIN: 20 OINTMENT TOPICAL at 20:03

## 2024-06-27 RX ADMIN — AMIODARONE HYDROCHLORIDE 200 MG: 200 TABLET ORAL at 14:09

## 2024-06-27 RX ADMIN — OXYCODONE 10 MG: 5 TABLET ORAL at 06:55

## 2024-06-27 RX ADMIN — MUPIROCIN: 20 OINTMENT TOPICAL at 10:24

## 2024-06-27 RX ADMIN — POLYETHYLENE GLYCOL 3350 17 G: 17 POWDER, FOR SOLUTION ORAL at 10:21

## 2024-06-27 RX ADMIN — POTASSIUM CHLORIDE 20 MEQ: 1500 TABLET, EXTENDED RELEASE ORAL at 12:23

## 2024-06-27 RX ADMIN — AMIODARONE HYDROCHLORIDE 200 MG: 200 TABLET ORAL at 10:21

## 2024-06-27 RX ADMIN — FUROSEMIDE 20 MG: 10 INJECTION, SOLUTION INTRAMUSCULAR; INTRAVENOUS at 12:23

## 2024-06-27 RX ADMIN — FUROSEMIDE 20 MG: 10 INJECTION, SOLUTION INTRAMUSCULAR; INTRAVENOUS at 14:09

## 2024-06-27 RX ADMIN — METOPROLOL TARTRATE 12.5 MG: 25 TABLET, FILM COATED ORAL at 20:01

## 2024-06-27 RX ADMIN — POTASSIUM CHLORIDE 20 MEQ: 1500 TABLET, EXTENDED RELEASE ORAL at 20:31

## 2024-06-27 RX ADMIN — AMIODARONE HYDROCHLORIDE 200 MG: 200 TABLET ORAL at 20:01

## 2024-06-27 RX ADMIN — DIPHENHYDRAMINE HYDROCHLORIDE 25 MG: 25 TABLET ORAL at 00:16

## 2024-06-27 RX ADMIN — FUROSEMIDE 20 MG: 10 INJECTION, SOLUTION INTRAMUSCULAR; INTRAVENOUS at 17:34

## 2024-06-27 RX ADMIN — PANTOPRAZOLE SODIUM 40 MG: 40 TABLET, DELAYED RELEASE ORAL at 10:22

## 2024-06-27 RX ADMIN — METOPROLOL TARTRATE 12.5 MG: 25 TABLET, FILM COATED ORAL at 10:22

## 2024-06-27 RX ADMIN — OXYCODONE 10 MG: 5 TABLET ORAL at 20:02

## 2024-06-27 ASSESSMENT — PAIN SCALES - GENERAL
PAINLEVEL_OUTOF10: 7
PAINLEVEL_OUTOF10: 7

## 2024-06-27 ASSESSMENT — PAIN DESCRIPTION - LOCATION
LOCATION: STERNUM
LOCATION: STERNUM

## 2024-06-27 NOTE — PLAN OF CARE
Problem: Safety - Adult  Goal: Free from fall injury  6/27/2024 1717 by Sherly Hart RN  Outcome: Progressing  6/27/2024 0352 by Gladys Banegas RN  Outcome: Progressing     Problem: Chronic Conditions and Co-morbidities  Goal: Patient's chronic conditions and co-morbidity symptoms are monitored and maintained or improved  6/27/2024 1717 by Sherly Hart RN  Outcome: Progressing  6/27/2024 0352 by Gladys Banegas RN  Outcome: Progressing     Problem: Discharge Planning  Goal: Discharge to home or other facility with appropriate resources  6/27/2024 1717 by Sherly Hart RN  Outcome: Progressing  6/27/2024 0352 by Gladys Banegas RN  Outcome: Progressing     Problem: ABCDS Injury Assessment  Goal: Absence of physical injury  6/27/2024 1717 by Sherly Hart RN  Outcome: Progressing  6/27/2024 0352 by Gladys Banegas RN  Outcome: Progressing     Problem: Respiratory - Adult  Goal: Achieves optimal ventilation and oxygenation  6/27/2024 1717 by Sherly Hart RN  Outcome: Progressing  6/27/2024 0352 by Gladys Banegsa RN  Outcome: Progressing     Problem: Pain  Goal: Verbalizes/displays adequate comfort level or baseline comfort level  6/27/2024 1717 by Sherly Hart RN  Outcome: Progressing  6/27/2024 0352 by Gladys Banegas RN  Outcome: Progressing     Problem: Skin/Tissue Integrity  Goal: Absence of new skin breakdown  Description: 1.  Monitor for areas of redness and/or skin breakdown  2.  Assess vascular access sites hourly  3.  Every 4-6 hours minimum:  Change oxygen saturation probe site  4.  Every 4-6 hours:  If on nasal continuous positive airway pressure, respiratory therapy assess nares and determine need for appliance change or resting period.  6/27/2024 1717 by Sherly Hart RN  Outcome: Progressing  6/27/2024 0352 by Gladys Banegas RN  Outcome: Progressing     Problem: Nutrition Deficit:  Goal: Optimize nutritional status  6/27/2024 1717 by Sherly Hart RN  Outcome: Progressing  6/27/2024 0352

## 2024-06-27 NOTE — PLAN OF CARE
Problem: Safety - Adult  Goal: Free from fall injury  6/27/2024 0352 by Gladys Banegas RN  Outcome: Progressing  6/26/2024 1353 by Jorge Putnam RN  Outcome: Progressing     Problem: Chronic Conditions and Co-morbidities  Goal: Patient's chronic conditions and co-morbidity symptoms are monitored and maintained or improved  6/27/2024 0352 by Gladys Banegas RN  Outcome: Progressing  6/26/2024 1353 by Jorge Putnam RN  Outcome: Progressing     Problem: Discharge Planning  Goal: Discharge to home or other facility with appropriate resources  6/27/2024 0352 by Gladys Banegas RN  Outcome: Progressing  6/26/2024 1353 by Jorge Putnam RN  Outcome: Progressing     Problem: ABCDS Injury Assessment  Goal: Absence of physical injury  6/27/2024 0352 by Gladys Banegas RN  Outcome: Progressing  6/26/2024 1353 by Jorge Putnam RN  Outcome: Progressing     Problem: Respiratory - Adult  Goal: Achieves optimal ventilation and oxygenation  6/27/2024 0352 by Gladys Banegas RN  Outcome: Progressing  6/26/2024 1943 by Monica Cisnerso RCP  Outcome: Progressing  6/26/2024 1353 by Jorge Putnam RN  Outcome: Progressing     Problem: Pain  Goal: Verbalizes/displays adequate comfort level or baseline comfort level  6/27/2024 0352 by Gladys Banegas RN  Outcome: Progressing  6/26/2024 1353 by Jorge Putnam RN  Outcome: Progressing  Flowsheets (Taken 6/26/2024 0400 by Heath Burt, RN)  Verbalizes/displays adequate comfort level or baseline comfort level: Encourage patient to monitor pain and request assistance     Problem: Skin/Tissue Integrity  Goal: Absence of new skin breakdown  Description: 1.  Monitor for areas of redness and/or skin breakdown  2.  Assess vascular access sites hourly  3.  Every 4-6 hours minimum:  Change oxygen saturation probe site  4.  Every 4-6 hours:  If on nasal continuous positive airway pressure, respiratory therapy assess nares and determine need for appliance change or resting

## 2024-06-27 NOTE — PROGRESS NOTES
Cardiology Progress Note                     Date:   6/27/2024  Patient name: Jesús Min  Date of admission:  6/18/2024  3:00 PM  MRN:   [id number]  YOB: 1975  PCP: No primary care provider on file.    Reason for Admission:  NSTEMI, HFrEF    Subjective:       Patient is now post op day 3 after CABG x 3  No acute events overnight  Remains hemodynamically stable and in normal sinus rhythm. Off milrinone infusion.   On high flow NC O2.   Getting PT this morning  Mild LE edema noted         Scheduled Meds:   furosemide  20 mg IntraVENous BID    And    potassium chloride  20 mEq Oral BID    aspirin  81 mg Oral Daily    sodium chloride flush  5-40 mL IntraVENous 2 times per day    clopidogrel  75 mg Oral Daily    amiodarone  200 mg Oral TID    mupirocin   Each Nostril BID    polyethylene glycol  17 g Oral Daily    sennosides-docusate sodium  1 tablet Oral BID    metoprolol tartrate  12.5 mg Oral BID    atorvastatin  20 mg Oral Nightly    pantoprazole  40 mg Oral Daily    Or    pantoprazole (PROTONIX) 40 mg in sodium chloride (PF) 0.9 % 10 mL injection  40 mg IntraVENous Daily    insulin lispro  0-12 Units SubCUTAneous TID WC       Continuous Infusions:   sodium chloride Stopped (06/25/24 1445)    sodium chloride 10 mL/hr at 06/26/24 0700    norepinephrine Stopped (06/25/24 1738)    EPINEPHrine Stopped (06/25/24 1314)    dextrose         Labs:     CBC:   Recent Labs     06/25/24  0606 06/26/24  0504 06/27/24  0905   WBC 15.2* 17.0* 16.3*   HGB 10.1* 9.5* 10.6*   PLT See Reflexed IPF Result See Reflexed IPF Result 167     BMP:    Recent Labs     06/25/24  0606 06/26/24  0504 06/27/24  0905    137 140   K 4.8 4.6 4.0   * 105 104   CO2 22 24 23   BUN 10 11 14   CREATININE 0.9 0.8 0.9   GLUCOSE 104* 128* 116*     Hepatic:   No results for input(s): \"AST\", \"ALT\", \"BILITOT\", \"ALKPHOS\" in the last 72 hours.    Invalid input(s): \"ALB\"    Troponin: No results for input(s): \"TROPONINI\" in

## 2024-06-27 NOTE — PROGRESS NOTES
Physical Therapy  Facility/Department: [facility] CAR 1- SICU  Physical Therapy Initial Assessment    Name: Jesús Min  : 1975  MRN: [id number]  Date of Service: 2024  Per H&P: \"49-year-old male presented to the hospital with lower extremity swelling and shortness of breath.\"    Discharge Recommendations:  Patient would benefit from continued therapy after discharge   PT Equipment Recommendations  Equipment Needed: Yes  Mobility Devices: Walker  Walker: Rolling  Other: AMb 80' RW CGAX1 this date.      Patient Diagnosis(es): The primary encounter diagnosis was CAD, multiple vessel. Diagnoses of Acute on chronic systolic (congestive) heart failure (HCC) and S/P CABG x 3 were also pertinent to this visit.  Past Medical History:  has a past medical history of CHF (congestive heart failure) (HCC) and Heart attack (HCC).  Past Surgical History:  has a past surgical history that includes Cardiac catheterization; Cardiac procedure (N/A, 2024); and Coronary artery bypass graft (N/A, 2024).    Assessment   Body Structures, Functions, Activity Limitations Requiring Skilled Therapeutic Intervention: Decreased ADL status;Decreased endurance;Decreased strength;Decreased safe awareness;Decreased functional mobility ;Increased pain;Decreased balance  Assessment: Amb 80' RW CGAX1 fatigue post-amb; lowest SpO2 80% upon entry to room with return to 95% uppon airwaymucus clearance by pt and highest SpO2 95% observed this eval; cues for deep breathing with good return and quick return of vitals to baseline. Sit-to-stand CGAX1. Consistent reassurance given to pt with frequent reports of pt asking about vitals and reporting he has increased anxiety at times. Pt presents with decreased safe ability to navigate stairs, decreased balance and strength, and decreased ability to safely amb longer distances. Therefore, pt would benefit from continued acute physical therapy to address deficits.  Therapy Prognosis: Good  Decision

## 2024-06-27 NOTE — PROGRESS NOTES
[facility] Cardiothoracic Surgery  Daily Progress Note          Surgeon:  Dr. Renee       Subjective:  Mr. Min is a 49 y.o. year-old male status post CABG X 3 (LIMA to LAD, RSVG1 to OM and RSVG2 to RPDA -->small, chronically occluded vessel) on 6/24/24.     Patient was seen and examined at bedside this morning without any complaints.      Vital Signs: /72   Pulse 82   Temp 98.6 °F (37 °C) (Oral)   Resp 21   Ht 1.68 m (5' 6.14\")   Wt 114.7 kg (252 lb 13.9 oz)   SpO2 91%   BMI 40.64 kg/m²  O2 Flow Rate (L/min): 12 L/min   Admit Weight: Weight - Scale: 113.1 kg (249 lb 5.4 oz)   WEIGHTWeight - Scale: 114.7 kg (252 lb 13.9 oz)     I/O's:  I/O last 3 completed shifts:  In: 1088.1 [P.O.:440; I.V.:398.1; IV Piggyback:250.1]  Out: 1610 [Urine:1300; Chest Tube:310]    Data:    CBC:   Recent Labs     06/25/24  0606 06/26/24  0504 06/27/24  0905   WBC 15.2* 17.0* 16.3*   HGB 10.1* 9.5* 10.6*   HCT 32.2* 30.2* 34.6*   MCV 89.2 89.3 92.5   PLT See Reflexed IPF Result See Reflexed IPF Result 167       BMP:   Recent Labs     06/25/24  0606 06/26/24  0504 06/27/24  0905    137 140   K 4.8 4.6 4.0   * 105 104   CO2 22 24 23   BUN 10 11 14   CREATININE 0.9 0.8 0.9       PT/INR:   Recent Labs     06/24/24  2130 06/25/24  0606 06/26/24  0504   PROTIME 16.1* 15.8* 16.0*   INR 1.3 1.3 1.3       APTT:   Recent Labs     06/24/24  1411 06/24/24  2130   APTT 25.4 34.0         Chest X-Ray: 6/27/24  EXAMINATION:  ONE XRAY VIEW OF THE CHEST     6/27/2024 6:03 am     COMPARISON:  06/26/2024, 06/25/2024     HISTORY:  ORDERING SYSTEM PROVIDED HISTORY: Post op open heart surgery  TECHNOLOGIST PROVIDED HISTORY:  Post op open heart surgery  Reason for Exam: port upright     FINDINGS:  [place]-Buster catheter removal.  Right subclavian line remains in place.  Chest  tubes remain in place.The cardiac and mediastinal contours appear unchanged.  Vascular congestion and bilateral airspace disease again

## 2024-06-27 NOTE — PROGRESS NOTES
sternal precautions throughout  Short Term Goal 4: Demo LB ADLs with SBA and use of AE/adaptive tech PRN with adherence to sternal precautions throughout  Short Term Goal 5: Identify and demo 100% adherence to sternal precautions throughout all functional activities each visit with 0 cues       Therapy Time   Individual Concurrent Group Co-treatment   Time In 0943         Time Out 1026         Minutes 43         Timed Code Treatment Minutes: 15 Minutes       Natasha Kelsey, OTR/L

## 2024-06-28 ENCOUNTER — APPOINTMENT (OUTPATIENT)
Dept: GENERAL RADIOLOGY | Age: 49
End: 2024-06-28
Attending: STUDENT IN AN ORGANIZED HEALTH CARE EDUCATION/TRAINING PROGRAM
Payer: COMMERCIAL

## 2024-06-28 LAB
ANION GAP SERPL CALCULATED.3IONS-SCNC: 12 MMOL/L (ref 9–16)
BUN SERPL-MCNC: 16 MG/DL (ref 6–20)
CALCIUM SERPL-MCNC: 8.8 MG/DL (ref 8.6–10.4)
CHLORIDE SERPL-SCNC: 102 MMOL/L (ref 98–107)
CO2 SERPL-SCNC: 20 MMOL/L (ref 20–31)
CREAT SERPL-MCNC: 0.8 MG/DL (ref 0.7–1.2)
ERYTHROCYTE [DISTWIDTH] IN BLOOD BY AUTOMATED COUNT: 15.1 % (ref 11.8–14.4)
GFR, ESTIMATED: >90 ML/MIN/1.73M2
GLUCOSE SERPL-MCNC: 100 MG/DL (ref 74–99)
HCT VFR BLD AUTO: 36.9 % (ref 40.7–50.3)
HGB BLD-MCNC: 10.8 G/DL (ref 13–17)
MAGNESIUM SERPL-MCNC: 2.4 MG/DL (ref 1.6–2.6)
MCH RBC QN AUTO: 28.1 PG (ref 25.2–33.5)
MCHC RBC AUTO-ENTMCNC: 29.3 G/DL (ref 28.4–34.8)
MCV RBC AUTO: 96.1 FL (ref 82.6–102.9)
NRBC BLD-RTO: 0.2 PER 100 WBC
PLATELET # BLD AUTO: 209 K/UL (ref 138–453)
PMV BLD AUTO: 10.2 FL (ref 8.1–13.5)
POTASSIUM SERPL-SCNC: 4.2 MMOL/L (ref 3.7–5.3)
RBC # BLD AUTO: 3.84 M/UL (ref 4.21–5.77)
SODIUM SERPL-SCNC: 134 MMOL/L (ref 136–145)
WBC OTHER # BLD: 13.4 K/UL (ref 3.5–11.3)

## 2024-06-28 PROCEDURE — 97110 THERAPEUTIC EXERCISES: CPT

## 2024-06-28 PROCEDURE — 99024 POSTOP FOLLOW-UP VISIT: CPT | Performed by: NURSE PRACTITIONER

## 2024-06-28 PROCEDURE — 2060000000 HC ICU INTERMEDIATE R&B

## 2024-06-28 PROCEDURE — 2700000000 HC OXYGEN THERAPY PER DAY

## 2024-06-28 PROCEDURE — 94761 N-INVAS EAR/PLS OXIMETRY MLT: CPT

## 2024-06-28 PROCEDURE — 83735 ASSAY OF MAGNESIUM: CPT

## 2024-06-28 PROCEDURE — 97535 SELF CARE MNGMENT TRAINING: CPT

## 2024-06-28 PROCEDURE — 80048 BASIC METABOLIC PNL TOTAL CA: CPT

## 2024-06-28 PROCEDURE — 6370000000 HC RX 637 (ALT 250 FOR IP): Performed by: NURSE PRACTITIONER

## 2024-06-28 PROCEDURE — 2580000003 HC RX 258: Performed by: NURSE PRACTITIONER

## 2024-06-28 PROCEDURE — 97530 THERAPEUTIC ACTIVITIES: CPT

## 2024-06-28 PROCEDURE — 2140000001 HC CVICU INTERMEDIATE R&B

## 2024-06-28 PROCEDURE — 36415 COLL VENOUS BLD VENIPUNCTURE: CPT

## 2024-06-28 PROCEDURE — 97116 GAIT TRAINING THERAPY: CPT

## 2024-06-28 PROCEDURE — 71045 X-RAY EXAM CHEST 1 VIEW: CPT

## 2024-06-28 PROCEDURE — 85027 COMPLETE CBC AUTOMATED: CPT

## 2024-06-28 RX ADMIN — MUPIROCIN: 20 OINTMENT TOPICAL at 09:09

## 2024-06-28 RX ADMIN — AMIODARONE HYDROCHLORIDE 200 MG: 200 TABLET ORAL at 20:57

## 2024-06-28 RX ADMIN — ATORVASTATIN CALCIUM 20 MG: 20 TABLET, FILM COATED ORAL at 20:57

## 2024-06-28 RX ADMIN — DIPHENHYDRAMINE HYDROCHLORIDE 25 MG: 25 TABLET ORAL at 00:03

## 2024-06-28 RX ADMIN — AMIODARONE HYDROCHLORIDE 200 MG: 200 TABLET ORAL at 09:04

## 2024-06-28 RX ADMIN — ASPIRIN 81 MG: 81 TABLET, COATED ORAL at 09:05

## 2024-06-28 RX ADMIN — DIPHENHYDRAMINE HYDROCHLORIDE 25 MG: 25 TABLET ORAL at 23:37

## 2024-06-28 RX ADMIN — PANTOPRAZOLE SODIUM 40 MG: 40 TABLET, DELAYED RELEASE ORAL at 09:04

## 2024-06-28 RX ADMIN — OXYCODONE 5 MG: 5 TABLET ORAL at 10:57

## 2024-06-28 RX ADMIN — CLOPIDOGREL BISULFATE 75 MG: 75 TABLET ORAL at 09:05

## 2024-06-28 RX ADMIN — OXYCODONE 5 MG: 5 TABLET ORAL at 20:58

## 2024-06-28 RX ADMIN — SODIUM CHLORIDE, PRESERVATIVE FREE 10 ML: 5 INJECTION INTRAVENOUS at 21:03

## 2024-06-28 RX ADMIN — SODIUM CHLORIDE, PRESERVATIVE FREE 10 ML: 5 INJECTION INTRAVENOUS at 09:07

## 2024-06-28 RX ADMIN — MUPIROCIN: 20 OINTMENT TOPICAL at 21:03

## 2024-06-28 RX ADMIN — METOPROLOL TARTRATE 12.5 MG: 25 TABLET, FILM COATED ORAL at 09:04

## 2024-06-28 RX ADMIN — AMIODARONE HYDROCHLORIDE 200 MG: 200 TABLET ORAL at 13:54

## 2024-06-28 RX ADMIN — METOPROLOL TARTRATE 12.5 MG: 25 TABLET, FILM COATED ORAL at 20:58

## 2024-06-28 ASSESSMENT — PAIN DESCRIPTION - ORIENTATION
ORIENTATION: RIGHT
ORIENTATION: MID

## 2024-06-28 ASSESSMENT — PAIN DESCRIPTION - LOCATION
LOCATION: ARM
LOCATION: INCISION;STERNUM

## 2024-06-28 ASSESSMENT — PAIN SCALES - GENERAL
PAINLEVEL_OUTOF10: 2
PAINLEVEL_OUTOF10: 0
PAINLEVEL_OUTOF10: 5
PAINLEVEL_OUTOF10: 5
PAINLEVEL_OUTOF10: 2

## 2024-06-28 ASSESSMENT — PAIN DESCRIPTION - DESCRIPTORS
DESCRIPTORS: ACHING;DISCOMFORT;SORE
DESCRIPTORS: ACHING

## 2024-06-28 NOTE — PLAN OF CARE
Problem: Safety - Adult  Goal: Free from fall injury  6/28/2024 0402 by Gladys Banegas RN  Outcome: Progressing  6/27/2024 1717 by Sherly Hart RN  Outcome: Progressing     Problem: Chronic Conditions and Co-morbidities  Goal: Patient's chronic conditions and co-morbidity symptoms are monitored and maintained or improved  6/28/2024 0402 by Gladys Banegas RN  Outcome: Progressing  6/27/2024 1717 by Sherly Hart RN  Outcome: Progressing     Problem: Discharge Planning  Goal: Discharge to home or other facility with appropriate resources  6/28/2024 0402 by Gladys Banegas RN  Outcome: Progressing  6/27/2024 1717 by Sherly Hart RN  Outcome: Progressing     Problem: ABCDS Injury Assessment  Goal: Absence of physical injury  6/28/2024 0402 by Gladys Banegas RN  Outcome: Progressing  6/27/2024 1717 by Sherly Hart RN  Outcome: Progressing     Problem: Respiratory - Adult  Goal: Achieves optimal ventilation and oxygenation  6/28/2024 0402 by Gladys Banegas RN  Outcome: Progressing  6/27/2024 1717 by Sherly Hart RN  Outcome: Progressing     Problem: Pain  Goal: Verbalizes/displays adequate comfort level or baseline comfort level  6/28/2024 0402 by Gladys Banegas RN  Outcome: Progressing  6/27/2024 1717 by Sherly Hart RN  Outcome: Progressing     Problem: Skin/Tissue Integrity  Goal: Absence of new skin breakdown  Description: 1.  Monitor for areas of redness and/or skin breakdown  2.  Assess vascular access sites hourly  3.  Every 4-6 hours minimum:  Change oxygen saturation probe site  4.  Every 4-6 hours:  If on nasal continuous positive airway pressure, respiratory therapy assess nares and determine need for appliance change or resting period.  6/28/2024 0402 by Gladys Banegas RN  Outcome: Progressing  6/27/2024 1717 by Sherly Hart RN  Outcome: Progressing     Problem: Nutrition Deficit:  Goal: Optimize nutritional status  6/28/2024 0402 by Gladys Banegas RN  Outcome: Progressing  6/27/2024 1717  by Sherly Hart, RN  Outcome: Progressing

## 2024-06-28 NOTE — PROGRESS NOTES
Physical Therapy  Facility/Department: [facility] CAR 1- SICU  Physical Therapy Treatment Note    Name: Jesús Min  : 1975  MRN: [id number]  Date of Service: 2024    Discharge Recommendations:  Patient would benefit from continued therapy after discharge   PT Equipment Recommendations  Equipment Needed: Yes  Walker: Rolling  Other: AMb 317' RW CGAX1 this date.      Patient Diagnosis(es): The primary encounter diagnosis was CAD, multiple vessel. Diagnoses of Acute on chronic systolic (congestive) heart failure (HCC) and S/P CABG x 3 were also pertinent to this visit.  Past Medical History:  has a past medical history of CHF (congestive heart failure) (HCC) and Heart attack (HCC).  Past Surgical History:  has a past surgical history that includes Cardiac catheterization; Cardiac procedure (N/A, 2024); and Coronary artery bypass graft (N/A, 2024).    Assessment   Body Structures, Functions, Activity Limitations Requiring Skilled Therapeutic Intervention: Decreased ADL status;Decreased endurance;Decreased strength;Decreased functional mobility ;Increased pain;Decreased balance;Decreased safe awareness  Assessment: Amb 317' RW CGAX1 with required seated rest break prior to standing dynamic exercises with SpO2 88% post-amb and quick return to >90% wth deep breathing, Sit-to-standx2 CGAX1. Standing and seated dynamic exercises done today, see exercises. Pt presents with decreased safe ability to navigate stairs, decreased balance and strength, and decreased ability to safely amb longer distances. Therefore, pt would benefit from continued acute physical therapy to address deficits.  Therapy Prognosis: Good  Decision Making: Medium Complexity  Requires PT Follow-Up: Yes  Activity Tolerance  Activity Tolerance: Patient limited by fatigue;Patient limited by pain;Other (comment)  Activity Tolerance Comments: Post-am pt reported feeling fatigued and required seated rest break prior to initiating standing  dynamic exercises. SpO2 88% post-amb with quick return to >90% with rest break and deep breathing.     Plan   Physical Therapy Plan  General Plan: 6-7 times per week  Current Treatment Recommendations: Strengthening, Balance training, Functional mobility training, Endurance training, Transfer training, Neuromuscular re-education, Stair training, Gait training, Home exercise program, Safety education & training, Therapeutic activities, Patient/Caregiver education & training, Equipment evaluation, education, & procurement, Pain management  Safety Devices  Type of Devices: Gait belt, Call light within reach, Nurse notified, Left in chair, Patient at risk for falls  Restraints  Restraints Initially in Place: No     Restrictions  Restrictions/Precautions  Restrictions/Precautions: Fall Risk, General Precautions, Up as Tolerated, Cardiac  Required Braces or Orthoses?: Yes  Required Braces or Orthoses  Other: Heart Hugger Brace  Position Activity Restriction  Sternal Precautions: No Pushing, No Pulling, 5# Lifting Restrictions  Other position/activity restrictions: amb. pt, up w/A, up in chair, s/p CABGx3 6/24     Subjective   General  Patient assessed for rehabilitation services?: Yes  Response To Previous Treatment: Not applicable  Family / Caregiver Present: No  Follows Commands: Within Functional Limits  General Comment  Comments: Pt and RN agreeable to PT. Pt found in chair.  Subjective  Subjective: Denies N/T. Pain in chest region 4-5/10; addressed with mobility.         Cognition   Orientation  Overall Orientation Status: Within Functional Limits  Orientation Level: Oriented X4  Cognition  Overall Cognitive Status: WFL     Objective   Observation/Palpation  Posture: Fair  Observation: Chest tube on L         Bed mobility  Bed Mobility Comments: Pt in chair upon arrival and exit.  Transfers  Sit to Stand: Contact guard assistance  Stand to Sit: Contact guard assistance  Comment: RW used. Verbal cues for RW,

## 2024-06-28 NOTE — PROGRESS NOTES
Occupational Therapy  Facility/Department: [facility] CAR 1- SICU  Occupational Therapy Daily Progress Note    Name: Jesús Min  : 1975  MRN: [id number]  Date of Service: 2024    Discharge Recommendations:Further therapy recommended at discharge.   Patient would benefit from continued therapy after discharge  OT Equipment Recommendations  Equipment Needed: Yes  Walker: Rolling  ADL Assistive Devices: Grab Bars - shower;Shower Chair with back       Patient Diagnosis(es): The primary encounter diagnosis was CAD, multiple vessel. Diagnoses of Acute on chronic systolic (congestive) heart failure (HCC) and S/P CABG x 3 were also pertinent to this visit.  Past Medical History:  has a past medical history of CHF (congestive heart failure) (HCC) and Heart attack (HCC).  Past Surgical History:  has a past surgical history that includes Cardiac catheterization; Cardiac procedure (N/A, 2024); and Coronary artery bypass graft (N/A, 2024).           Assessment   Performance deficits / Impairments: Decreased functional mobility ;Decreased ADL status;Decreased balance;Decreased high-level IADLs;Decreased endurance  Prognosis: Good  Decision Making: Medium Complexity  No Skilled OT: Independent with functional mobility;Independent with ADL's;At baseline function;Safe to return home  REQUIRES OT FOLLOW-UP: Yes  Activity Tolerance  Activity Tolerance: Patient Tolerated treatment well        Plan   Occupational Therapy Plan  Times Per Week: 4-6x/wk (CABG)  Current Treatment Recommendations: Balance training, Functional mobility training, Endurance training, Patient/Caregiver education & training, Equipment evaluation, education, & procurement, Return to work related activity, Safety education & training, Home management training, Self-Care / ADL     Restrictions  Restrictions/Precautions  Restrictions/Precautions: Fall Risk, General Precautions, Up as Tolerated, Cardiac  Required Braces or Orthoses?: Yes  Required

## 2024-06-28 NOTE — PROGRESS NOTES
[facility] Cardiothoracic Surgery  Daily Progress Note          Surgeon:  Dr. Renee       Subjective:  Mr. Min is a 49 y.o. year-old male status post CABG X 3 (LIMA to LAD, RSVG1 to OM and RSVG2 to RPDA -->small, chronically occluded vessel) on 6/24/24.     Patient was seen and examined at bedside this morning without any complaints.      Vital Signs: /71   Pulse 71   Temp 99 °F (37.2 °C) (Oral)   Resp 18   Ht 1.68 m (5' 6.14\")   Wt 113.2 kg (249 lb 9 oz)   SpO2 93%   BMI 40.11 kg/m²  O2 Flow Rate (L/min): 6 L/min   Admit Weight: Weight - Scale: 113.1 kg (249 lb 5.4 oz)   WEIGHTWeight - Scale: 113.2 kg (249 lb 9 oz)     I/O's:  I/O last 3 completed shifts:  In: 240 [P.O.:240]  Out: 2390 [Urine:2200; Chest Tube:190]    Data:    CBC:   Recent Labs     06/26/24  0504 06/27/24  0905 06/28/24  0546   WBC 17.0* 16.3* 13.4*   HGB 9.5* 10.6* 10.8*   HCT 30.2* 34.6* 36.9*   MCV 89.3 92.5 96.1   PLT See Reflexed IPF Result 167 209     BMP:   Recent Labs     06/26/24  0504 06/27/24  0905 06/28/24  0546    140 134*   K 4.6 4.0 4.2    104 102   CO2 24 23 20   BUN 11 14 16   CREATININE 0.8 0.9 0.8     PT/INR:   Recent Labs     06/26/24  0504   PROTIME 16.0*   INR 1.3     APTT:   No results for input(s): \"APTT\" in the last 72 hours.    Chest X-Ray: 6/27/24  EXAMINATION:  ONE XRAY VIEW OF THE CHEST     6/27/2024 6:03 am     COMPARISON:  06/26/2024, 06/25/2024     HISTORY:  ORDERING SYSTEM PROVIDED HISTORY: Post op open heart surgery  TECHNOLOGIST PROVIDED HISTORY:  Post op open heart surgery  Reason for Exam: port upright     FINDINGS:  Arlee-Buster catheter removal.  Right subclavian line remains in place.  Chest  tubes remain in place.The cardiac and mediastinal contours appear unchanged.  Vascular congestion and bilateral airspace disease again demonstrated, more  prominent on the right side in the interval.  Probable small effusions again  noted.  No evidence for pneumothorax.      IMPRESSION:  Bilateral airspace disease, vascular congestion and pleural effusions again  noted, more prominent on the right side.  Parlin-Buster catheter removal.     Scheduled Meds:    aspirin  81 mg Oral Daily    sodium chloride flush  5-40 mL IntraVENous 2 times per day    clopidogrel  75 mg Oral Daily    amiodarone  200 mg Oral TID    mupirocin   Each Nostril BID    polyethylene glycol  17 g Oral Daily    sennosides-docusate sodium  1 tablet Oral BID    metoprolol tartrate  12.5 mg Oral BID    atorvastatin  20 mg Oral Nightly    pantoprazole  40 mg Oral Daily    Or    pantoprazole (PROTONIX) 40 mg in sodium chloride (PF) 0.9 % 10 mL injection  40 mg IntraVENous Daily    insulin lispro  0-12 Units SubCUTAneous TID WC     Continuous Infusions:    sodium chloride Stopped (06/25/24 1445)    sodium chloride 10 mL/hr at 06/26/24 0700    norepinephrine Stopped (06/25/24 1738)    EPINEPHrine Stopped (06/25/24 1314)    dextrose           Physical Exam:    General: A&O x 3, NAD noted  HEENT: NCAT, PEERLA  Heart: Normal S1, S2, RRR, No murmurs noted   Sternum: CDI, No click noted   Lungs: Diminished BS bilaterally at the bases. CT's in place to LWS. No air leak noted   Abdomen: Soft, non tender, non distended, + BS x 4   : Chisholm in place   Extremities: Trace edema noted bilateral LE. EVH sites: CDI       Assessment & Plan:    6/28/24  Continue with aggressive heart failure management.  Patient has a LifeVest at home that his wife will bring back on day of discharge.  Patient was fitted for LifeVest prior to surgery with anticipation that his EF would not get better than 35% postop  Please continue with a 12-1500ml (no more than 5 cups of fluid p.o. )  fluid restriction  Evaluate with cardiology on starting heart failure medications such as Entresto and Jardiance  Currently beginning ACE inhibitor  Please continue to diurese around-the-clock  Patient is stable to stepdown car 2  DC planning rehab versus home with family

## 2024-06-28 NOTE — PROGRESS NOTES
06/28/24 0723   Care Plan - Respiratory Goals   Achieves optimal ventilation and oxygenation Assess for changes in respiratory status;Assess for changes in mentation and behavior;Position to facilitate oxygenation and minimize respiratory effort;Oxygen supplementation based on oxygen saturation or arterial blood gases;Encourage broncho-pulmonary hygiene including cough, deep breathe, incentive spirometry;Respiratory therapy support as indicated

## 2024-06-29 ENCOUNTER — APPOINTMENT (OUTPATIENT)
Dept: GENERAL RADIOLOGY | Age: 49
End: 2024-06-29
Attending: STUDENT IN AN ORGANIZED HEALTH CARE EDUCATION/TRAINING PROGRAM
Payer: COMMERCIAL

## 2024-06-29 LAB
ANION GAP SERPL CALCULATED.3IONS-SCNC: 10 MMOL/L (ref 9–16)
BUN SERPL-MCNC: 15 MG/DL (ref 6–20)
CALCIUM SERPL-MCNC: 8.6 MG/DL (ref 8.6–10.4)
CHLORIDE SERPL-SCNC: 103 MMOL/L (ref 98–107)
CO2 SERPL-SCNC: 26 MMOL/L (ref 20–31)
CREAT SERPL-MCNC: 0.9 MG/DL (ref 0.7–1.2)
ERYTHROCYTE [DISTWIDTH] IN BLOOD BY AUTOMATED COUNT: 15.1 % (ref 11.8–14.4)
GFR, ESTIMATED: >90 ML/MIN/1.73M2
GLUCOSE BLD-MCNC: 100 MG/DL (ref 75–110)
GLUCOSE BLD-MCNC: 120 MG/DL (ref 75–110)
GLUCOSE BLD-MCNC: 90 MG/DL (ref 75–110)
GLUCOSE SERPL-MCNC: 108 MG/DL (ref 74–99)
HCT VFR BLD AUTO: 33.8 % (ref 40.7–50.3)
HGB BLD-MCNC: 10.7 G/DL (ref 13–17)
MAGNESIUM SERPL-MCNC: 2.2 MG/DL (ref 1.6–2.6)
MCH RBC QN AUTO: 28.3 PG (ref 25.2–33.5)
MCHC RBC AUTO-ENTMCNC: 31.7 G/DL (ref 28.4–34.8)
MCV RBC AUTO: 89.4 FL (ref 82.6–102.9)
NRBC BLD-RTO: 0.2 PER 100 WBC
PLATELET # BLD AUTO: 245 K/UL (ref 138–453)
PMV BLD AUTO: 10.2 FL (ref 8.1–13.5)
POTASSIUM SERPL-SCNC: 3.8 MMOL/L (ref 3.7–5.3)
RBC # BLD AUTO: 3.78 M/UL (ref 4.21–5.77)
SODIUM SERPL-SCNC: 139 MMOL/L (ref 136–145)
WBC OTHER # BLD: 10.6 K/UL (ref 3.5–11.3)

## 2024-06-29 PROCEDURE — 2700000000 HC OXYGEN THERAPY PER DAY

## 2024-06-29 PROCEDURE — 36415 COLL VENOUS BLD VENIPUNCTURE: CPT

## 2024-06-29 PROCEDURE — 6360000002 HC RX W HCPCS: Performed by: INTERNAL MEDICINE

## 2024-06-29 PROCEDURE — 85027 COMPLETE CBC AUTOMATED: CPT

## 2024-06-29 PROCEDURE — 2140000001 HC CVICU INTERMEDIATE R&B

## 2024-06-29 PROCEDURE — 6370000000 HC RX 637 (ALT 250 FOR IP): Performed by: NURSE PRACTITIONER

## 2024-06-29 PROCEDURE — 6370000000 HC RX 637 (ALT 250 FOR IP): Performed by: PHYSICIAN ASSISTANT

## 2024-06-29 PROCEDURE — 94761 N-INVAS EAR/PLS OXIMETRY MLT: CPT

## 2024-06-29 PROCEDURE — 83735 ASSAY OF MAGNESIUM: CPT

## 2024-06-29 PROCEDURE — 2580000003 HC RX 258: Performed by: NURSE PRACTITIONER

## 2024-06-29 PROCEDURE — 99233 SBSQ HOSP IP/OBS HIGH 50: CPT | Performed by: INTERNAL MEDICINE

## 2024-06-29 PROCEDURE — 71045 X-RAY EXAM CHEST 1 VIEW: CPT

## 2024-06-29 PROCEDURE — 97116 GAIT TRAINING THERAPY: CPT

## 2024-06-29 PROCEDURE — 80048 BASIC METABOLIC PNL TOTAL CA: CPT

## 2024-06-29 PROCEDURE — 82947 ASSAY GLUCOSE BLOOD QUANT: CPT

## 2024-06-29 RX ORDER — FUROSEMIDE 10 MG/ML
40 INJECTION INTRAMUSCULAR; INTRAVENOUS 2 TIMES DAILY
Status: DISCONTINUED | OUTPATIENT
Start: 2024-06-29 | End: 2024-07-01

## 2024-06-29 RX ORDER — POTASSIUM CHLORIDE 20 MEQ/1
20 TABLET, EXTENDED RELEASE ORAL 2 TIMES DAILY
Status: DISCONTINUED | OUTPATIENT
Start: 2024-06-29 | End: 2024-07-01 | Stop reason: HOSPADM

## 2024-06-29 RX ORDER — LISINOPRIL 5 MG/1
2.5 TABLET ORAL DAILY
Status: DISCONTINUED | OUTPATIENT
Start: 2024-06-29 | End: 2024-07-01 | Stop reason: HOSPADM

## 2024-06-29 RX ADMIN — AMIODARONE HYDROCHLORIDE 200 MG: 200 TABLET ORAL at 08:16

## 2024-06-29 RX ADMIN — FUROSEMIDE 40 MG: 10 INJECTION, SOLUTION INTRAMUSCULAR; INTRAVENOUS at 16:52

## 2024-06-29 RX ADMIN — AMIODARONE HYDROCHLORIDE 200 MG: 200 TABLET ORAL at 13:52

## 2024-06-29 RX ADMIN — ATORVASTATIN CALCIUM 20 MG: 20 TABLET, FILM COATED ORAL at 21:01

## 2024-06-29 RX ADMIN — SODIUM CHLORIDE, PRESERVATIVE FREE 10 ML: 5 INJECTION INTRAVENOUS at 08:16

## 2024-06-29 RX ADMIN — LISINOPRIL 2.5 MG: 5 TABLET ORAL at 16:52

## 2024-06-29 RX ADMIN — FUROSEMIDE 40 MG: 10 INJECTION, SOLUTION INTRAMUSCULAR; INTRAVENOUS at 09:30

## 2024-06-29 RX ADMIN — OXYCODONE 5 MG: 5 TABLET ORAL at 03:31

## 2024-06-29 RX ADMIN — OXYCODONE 5 MG: 5 TABLET ORAL at 23:01

## 2024-06-29 RX ADMIN — METOPROLOL TARTRATE 12.5 MG: 25 TABLET, FILM COATED ORAL at 21:01

## 2024-06-29 RX ADMIN — SODIUM CHLORIDE, PRESERVATIVE FREE 10 ML: 5 INJECTION INTRAVENOUS at 21:05

## 2024-06-29 RX ADMIN — POTASSIUM CHLORIDE 20 MEQ: 1500 TABLET, EXTENDED RELEASE ORAL at 16:52

## 2024-06-29 RX ADMIN — POTASSIUM CHLORIDE 20 MEQ: 1500 TABLET, EXTENDED RELEASE ORAL at 21:01

## 2024-06-29 RX ADMIN — OXYCODONE 10 MG: 5 TABLET ORAL at 17:32

## 2024-06-29 RX ADMIN — METOPROLOL TARTRATE 12.5 MG: 25 TABLET, FILM COATED ORAL at 08:16

## 2024-06-29 RX ADMIN — OXYCODONE 10 MG: 5 TABLET ORAL at 08:21

## 2024-06-29 RX ADMIN — AMIODARONE HYDROCHLORIDE 200 MG: 200 TABLET ORAL at 21:00

## 2024-06-29 RX ADMIN — ASPIRIN 81 MG: 81 TABLET, COATED ORAL at 08:16

## 2024-06-29 RX ADMIN — PANTOPRAZOLE SODIUM 40 MG: 40 TABLET, DELAYED RELEASE ORAL at 08:16

## 2024-06-29 RX ADMIN — CLOPIDOGREL BISULFATE 75 MG: 75 TABLET ORAL at 08:16

## 2024-06-29 ASSESSMENT — PAIN SCALES - GENERAL
PAINLEVEL_OUTOF10: 7
PAINLEVEL_OUTOF10: 5
PAINLEVEL_OUTOF10: 5
PAINLEVEL_OUTOF10: 2
PAINLEVEL_OUTOF10: 5
PAINLEVEL_OUTOF10: 6
PAINLEVEL_OUTOF10: 2
PAINLEVEL_OUTOF10: 0

## 2024-06-29 ASSESSMENT — PAIN DESCRIPTION - DESCRIPTORS
DESCRIPTORS: DISCOMFORT;SORE
DESCRIPTORS: ACHING;DISCOMFORT
DESCRIPTORS: DISCOMFORT

## 2024-06-29 ASSESSMENT — PAIN DESCRIPTION - LOCATION
LOCATION: INCISION

## 2024-06-29 ASSESSMENT — PAIN DESCRIPTION - ORIENTATION
ORIENTATION: MID

## 2024-06-29 ASSESSMENT — PAIN DESCRIPTION - PAIN TYPE: TYPE: SURGICAL PAIN

## 2024-06-29 NOTE — PROGRESS NOTES
[facility] Cardiothoracic Surgery  Daily Progress Note          Surgeon:  Dr. Renee       Subjective:  Mr. Min is a 49 y.o. year-old male status post CABG X 3 (LIMA to LAD, RSVG1 to OM and RSVG2 to RPDA -->small, chronically occluded vessel) on 6/24/24.     Patient was seen and examined at bedside this morning without any complaints. Doing well.       Vital Signs: /72   Pulse 61   Temp 98.8 °F (37.1 °C)   Resp 18   Ht 1.68 m (5' 6.14\")   Wt 119.5 kg (263 lb 7.2 oz)   SpO2 92%   BMI 42.34 kg/m²  O2 Flow Rate (L/min): 4 L/min   Admit Weight: Weight - Scale: 113.1 kg (249 lb 5.4 oz)   WEIGHTWeight - Scale: 119.5 kg (263 lb 7.2 oz)     I/O's:  I/O last 3 completed shifts:  In: 450 [P.O.:450]  Out: 630 [Urine:550; Chest Tube:80]    Data:    CBC:   Recent Labs     06/27/24  0905 06/28/24  0546 06/29/24  0757   WBC 16.3* 13.4* 10.6   HGB 10.6* 10.8* 10.7*   HCT 34.6* 36.9* 33.8*   MCV 92.5 96.1 89.4    209 245       BMP:   Recent Labs     06/27/24  0905 06/28/24  0546 06/29/24  0757    134* 139   K 4.0 4.2 3.8    102 103   CO2 23 20 26   BUN 14 16 15   CREATININE 0.9 0.8 0.9       PT/INR:   No results for input(s): \"PROTIME\", \"INR\" in the last 72 hours.    APTT:   No results for input(s): \"APTT\" in the last 72 hours.      Chest X-Ray: 6/29/24  EXAMINATION:  ONE XRAY VIEW OF THE CHEST     6/29/2024 9:01 am     COMPARISON:  06/28/2024, 06/27/2024     HISTORY:  ORDERING SYSTEM PROVIDED HISTORY: Post op open heart surgery  TECHNOLOGIST PROVIDED HISTORY:  Post op open heart surgery     FINDINGS:  Right subclavian line removal.Chest tubes appear removed.The cardiac and  mediastinal contours appear unchanged. Basilar opacities and pleural  effusions appear improved. No new airspace disease identified in the  interval.  No evidence for pneumothorax.     IMPRESSION:  Interval improved appearance of lung bases.      Scheduled Meds:    furosemide  40 mg IntraVENous BID     Incentive Spirometry / Pulmonary hygiene  - Pain management PRN  - Discharge planning home   - Further recommendations as per Dr. Devine    The above recommendations including medications and orders were discussed and agreed upon with Dr. Nilson Smith MBA, PA-C

## 2024-06-29 NOTE — PROGRESS NOTES
Cardiology Progress Note                     Date:   6/29/2024  Patient name: Jesús Min  Date of admission:  6/18/2024  3:00 PM  MRN:   [id number]  YOB: 1975  PCP: No primary care provider on file.    Reason for Admission:  NSTEMI, HFrEF    Subjective:       No acute events overnight  Remains hemodynamically stable and in normal sinus rhythm.   On 4 L NC O2 in no distress  LE edema persist          Scheduled Meds:   aspirin  81 mg Oral Daily    sodium chloride flush  5-40 mL IntraVENous 2 times per day    clopidogrel  75 mg Oral Daily    amiodarone  200 mg Oral TID    polyethylene glycol  17 g Oral Daily    sennosides-docusate sodium  1 tablet Oral BID    metoprolol tartrate  12.5 mg Oral BID    atorvastatin  20 mg Oral Nightly    pantoprazole  40 mg Oral Daily    Or    pantoprazole (PROTONIX) 40 mg in sodium chloride (PF) 0.9 % 10 mL injection  40 mg IntraVENous Daily    insulin lispro  0-12 Units SubCUTAneous TID WC       Continuous Infusions:   sodium chloride Stopped (06/25/24 1445)    sodium chloride 10 mL/hr at 06/26/24 0700    norepinephrine Stopped (06/25/24 1738)    EPINEPHrine Stopped (06/25/24 1314)    dextrose         Labs:     CBC:   Recent Labs     06/27/24  0905 06/28/24  0546 06/29/24  0757   WBC 16.3* 13.4* 10.6   HGB 10.6* 10.8* 10.7*    209 245     BMP:    Recent Labs     06/27/24  0905 06/28/24  0546 06/29/24  0757    134* 139   K 4.0 4.2 3.8    102 103   CO2 23 20 26   BUN 14 16 15   CREATININE 0.9 0.8 0.9   GLUCOSE 116* 100* 108*     Hepatic:   No results for input(s): \"AST\", \"ALT\", \"BILITOT\", \"ALKPHOS\" in the last 72 hours.    Invalid input(s): \"ALB\"    Troponin: No results for input(s): \"TROPONINI\" in the last 72 hours.  BNP: No results for input(s): \"BNP\" in the last 72 hours.  Lipids: No results for input(s): \"CHOL\", \"HDL\" in the last 72 hours.    Invalid input(s): \"LDLCALCU\"  INR:   No results for input(s): \"INR\" in the last 72  hours.          Objective:     Vitals: /71   Pulse 70   Temp 98.8 °F (37.1 °C)   Resp 18   Ht 1.68 m (5' 6.14\")   Wt 119.5 kg (263 lb 7.2 oz)   SpO2 (!) 87%   BMI 42.34 kg/m²     General appearance: Alert, oriented, on 4 L nasal cannula oxygen  HEENT: Head: Normocephalic, no lesions, without obvious abnormality  Neck: no JVD  Lungs: Reduced air entry at the bases with inspiratory rales  Heart: regular rate and rhythm, S1, S2 normal,  Sternal incision with dressing  Abdomen: soft, non-tender; bowel sounds normal  Extremities: 2+ bilateral lower extremity edema  Neurologic: Mental status: Intubated and sedated.  Neuro not done.      Cardiac testing:     EKG  sinus tachycardia, IVCD               Echocardiogram 6/16/2024:    Left Ventricle: Severely reduced left ventricular systolic function with a visually estimated EF of 15 - 20%. Left ventricle is mildly dilated. Increased wall thickness. Findings consistent with mild eccentric hypertrophy. Severe global hypokinesis present, with minor regional variation. Grade III diastolic dysfunction with increased LAP.    Right Ventricle: Right ventricle size is normal. Moderately reduced systolic function.    Aortic Valve: Mild regurgitation.    Mitral Valve: Mildly thickened leaflet. Mild regurgitation.    Tricuspid Valve: Mild regurgitation. Mildly elevated RVSP, consistent with mild pulmonary hypertension. The estimated RVSP is 46 mmHg.    Pulmonic Valve: Mild regurgitation.    Image quality is adequate.    Coronary Angiography 6/17/24:     Severe Multivessel coronary artery disease    Severely impaired left ventricular function with estimated EF 20-25%.    Limited TTE 6/18/24    Left Ventricle: Severely reduced left ventricular systolic function with a visually estimated EF of 15 - 20%. EF by 2D Simpsons Biplane is 23%. Left ventricle is dilated. Severe global hypokinesis present.    Image quality is good.     Impression:   Acute coronary

## 2024-06-29 NOTE — PROGRESS NOTES
Physical Therapy  Facility/Department: [facility] CAR 2- STEPDOWN  Physical Therapy Daily Note    Name: Jesús Min  : 1975  MRN: [id number]  Date of Service: 2024    Discharge Recommendations:  Patient would benefit from continued therapy after discharge   PT Equipment Recommendations  Equipment Needed: No      Patient Diagnosis(es): The primary encounter diagnosis was CAD, multiple vessel. Diagnoses of Acute on chronic systolic (congestive) heart failure (HCC) and S/P CABG x 3 were also pertinent to this visit.  Past Medical History:  has a past medical history of CHF (congestive heart failure) (HCC) and Heart attack (HCC).  Past Surgical History:  has a past surgical history that includes Cardiac catheterization; Cardiac procedure (N/A, 2024); and Coronary artery bypass graft (N/A, 2024).    Assessment   Assessment: Transfers SBA.  feet, RW, CGA for safety, 2L O2 by nasal canual. Slight fatigue with AMB. Pt states \"It feels good\" to walk.  No LOB with AMB.  Therapy Prognosis: Good  Decision Making: Medium Complexity  Requires PT Follow-Up: Yes  Activity Tolerance  Activity Tolerance: Other (comment);Patient tolerated treatment well  Activity Tolerance Comments: Slight fatigue with AMB. Pt states \"It felt good\"  to walk.     Plan   Physical Therapy Plan  General Plan: 6-7 times per week  Current Treatment Recommendations: Strengthening, Balance training, Functional mobility training, Endurance training, Transfer training, Neuromuscular re-education, Stair training, Gait training, Home exercise program, Safety education & training, Therapeutic activities, Patient/Caregiver education & training, Equipment evaluation, education, & procurement, Pain management  Safety Devices  Type of Devices: Gait belt, Call light within reach, Nurse notified, Left in chair, Patient at risk for falls  Restraints  Restraints Initially in Place: No  from another person needed to walk in hospital room?: A Little  AM-PAC Inpatient Mobility without Stair Climbing Raw Score : 15  AM-PAC Inpatient without Stair Climbing T-Scale Score : 43.03  Mobility Inpatient CMS 0-100% Score: 47.43  Mobility Inpatient without Stair CMS G-Code Modifier : CK      Goals  Short Term Goals  Time Frame for Short Term Goals: 14 visits  Short Term Goal 1: will demo transfers mod(I) in order to be safe and increase functional mobility  Short Term Goal 2: will demo bed mobility mod(I) in order to be safe and increase functional mobility  Short Term Goal 3: will amb 250' mod(I) with RW or least restrictive device in order to be safe and increase functional mobility  Short Term Goal 4: will navigate 1 stair with mod(I) (no hand rail) in order to safely navigate curbs outside the home  Short Term Goal 5: will teach back all precautions / heart hugger usage Independently in order to be safe upon return home and regain independence       Education  Patient Education  Education Given To: Patient  Education Provided: Role of Therapy;Plan of Care;Equipment;Precautions;Fall Prevention Strategies;Transfer Training  Education Provided Comments: sternal precautions, deep breathing, heart hugger  Education Method: Demonstration;Verbal  Barriers to Learning: None  Education Outcome: Verbalized understanding;Demonstrated understanding      Therapy Time   Individual Concurrent Group Co-treatment   Time In 1300         Time Out 1315         Minutes 15                 THERESE BEAVER, PTA

## 2024-06-29 NOTE — PLAN OF CARE
Problem: Safety - Adult  Goal: Free from fall injury  Outcome: Progressing     Problem: Chronic Conditions and Co-morbidities  Goal: Patient's chronic conditions and co-morbidity symptoms are monitored and maintained or improved  Outcome: Progressing     Problem: Discharge Planning  Goal: Discharge to home or other facility with appropriate resources  Outcome: Progressing     Problem: ABCDS Injury Assessment  Goal: Absence of physical injury  Outcome: Progressing     Problem: Respiratory - Adult  Goal: Achieves optimal ventilation and oxygenation  Outcome: Progressing  Flowsheets (Taken 6/29/2024 0311 by Gudelia Jones RCP)  Achieves optimal ventilation and oxygenation:   Respiratory therapy support as indicated   Assess the need for suctioning and aspirate as needed   Assess and instruct to report shortness of breath or any respiratory difficulty   Encourage broncho-pulmonary hygiene including cough, deep breathe, incentive spirometry   Initiate smoking cessation protocol as indicated   Oxygen supplementation based on oxygen saturation or arterial blood gases   Position to facilitate oxygenation and minimize respiratory effort   Assess for changes in mentation and behavior   Assess for changes in respiratory status     Problem: Pain  Goal: Verbalizes/displays adequate comfort level or baseline comfort level  Outcome: Progressing     Problem: Skin/Tissue Integrity  Goal: Absence of new skin breakdown  Description: 1.  Monitor for areas of redness and/or skin breakdown  2.  Assess vascular access sites hourly  3.  Every 4-6 hours minimum:  Change oxygen saturation probe site  4.  Every 4-6 hours:  If on nasal continuous positive airway pressure, respiratory therapy assess nares and determine need for appliance change or resting period.  Outcome: Progressing     Problem: Nutrition Deficit:  Goal: Optimize nutritional status  Outcome: Progressing

## 2024-06-29 NOTE — PROGRESS NOTES
06/29/24 0311   Care Plan - Respiratory Goals   Achieves optimal ventilation and oxygenation Respiratory therapy support as indicated;Assess the need for suctioning and aspirate as needed;Assess and instruct to report shortness of breath or any respiratory difficulty;Encourage broncho-pulmonary hygiene including cough, deep breathe, incentive spirometry;Initiate smoking cessation protocol as indicated;Oxygen supplementation based on oxygen saturation or arterial blood gases;Position to facilitate oxygenation and minimize respiratory effort;Assess for changes in mentation and behavior;Assess for changes in respiratory status

## 2024-06-30 ENCOUNTER — APPOINTMENT (OUTPATIENT)
Dept: GENERAL RADIOLOGY | Age: 49
End: 2024-06-30
Attending: STUDENT IN AN ORGANIZED HEALTH CARE EDUCATION/TRAINING PROGRAM
Payer: COMMERCIAL

## 2024-06-30 LAB
ANION GAP SERPL CALCULATED.3IONS-SCNC: 12 MMOL/L (ref 9–16)
BUN SERPL-MCNC: 16 MG/DL (ref 6–20)
CALCIUM SERPL-MCNC: 8.8 MG/DL (ref 8.6–10.4)
CHLORIDE SERPL-SCNC: 102 MMOL/L (ref 98–107)
CO2 SERPL-SCNC: 25 MMOL/L (ref 20–31)
CREAT SERPL-MCNC: 0.9 MG/DL (ref 0.7–1.2)
ERYTHROCYTE [DISTWIDTH] IN BLOOD BY AUTOMATED COUNT: 15.2 % (ref 11.8–14.4)
GFR, ESTIMATED: >90 ML/MIN/1.73M2
GLUCOSE BLD-MCNC: 115 MG/DL (ref 75–110)
GLUCOSE BLD-MCNC: 136 MG/DL (ref 75–110)
GLUCOSE BLD-MCNC: 140 MG/DL (ref 75–110)
GLUCOSE BLD-MCNC: 99 MG/DL (ref 75–110)
GLUCOSE SERPL-MCNC: 104 MG/DL (ref 74–99)
HCT VFR BLD AUTO: 35.2 % (ref 40.7–50.3)
HGB BLD-MCNC: 11 G/DL (ref 13–17)
MAGNESIUM SERPL-MCNC: 2.1 MG/DL (ref 1.6–2.6)
MCH RBC QN AUTO: 28.4 PG (ref 25.2–33.5)
MCHC RBC AUTO-ENTMCNC: 31.3 G/DL (ref 28.4–34.8)
MCV RBC AUTO: 90.7 FL (ref 82.6–102.9)
NRBC BLD-RTO: 0.2 PER 100 WBC
PLATELET # BLD AUTO: 292 K/UL (ref 138–453)
PMV BLD AUTO: 9.6 FL (ref 8.1–13.5)
POTASSIUM SERPL-SCNC: 3.9 MMOL/L (ref 3.7–5.3)
RBC # BLD AUTO: 3.88 M/UL (ref 4.21–5.77)
SODIUM SERPL-SCNC: 139 MMOL/L (ref 136–145)
WBC OTHER # BLD: 12.5 K/UL (ref 3.5–11.3)

## 2024-06-30 PROCEDURE — 2700000000 HC OXYGEN THERAPY PER DAY

## 2024-06-30 PROCEDURE — 2140000001 HC CVICU INTERMEDIATE R&B

## 2024-06-30 PROCEDURE — 36415 COLL VENOUS BLD VENIPUNCTURE: CPT

## 2024-06-30 PROCEDURE — 83735 ASSAY OF MAGNESIUM: CPT

## 2024-06-30 PROCEDURE — 6370000000 HC RX 637 (ALT 250 FOR IP): Performed by: NURSE PRACTITIONER

## 2024-06-30 PROCEDURE — 85027 COMPLETE CBC AUTOMATED: CPT

## 2024-06-30 PROCEDURE — 80048 BASIC METABOLIC PNL TOTAL CA: CPT

## 2024-06-30 PROCEDURE — 6360000002 HC RX W HCPCS: Performed by: INTERNAL MEDICINE

## 2024-06-30 PROCEDURE — 6370000000 HC RX 637 (ALT 250 FOR IP): Performed by: PHYSICIAN ASSISTANT

## 2024-06-30 PROCEDURE — 71045 X-RAY EXAM CHEST 1 VIEW: CPT

## 2024-06-30 PROCEDURE — 99233 SBSQ HOSP IP/OBS HIGH 50: CPT | Performed by: INTERNAL MEDICINE

## 2024-06-30 PROCEDURE — 82947 ASSAY GLUCOSE BLOOD QUANT: CPT

## 2024-06-30 PROCEDURE — 2580000003 HC RX 258: Performed by: NURSE PRACTITIONER

## 2024-06-30 PROCEDURE — 94761 N-INVAS EAR/PLS OXIMETRY MLT: CPT

## 2024-06-30 RX ADMIN — FUROSEMIDE 40 MG: 10 INJECTION, SOLUTION INTRAMUSCULAR; INTRAVENOUS at 17:59

## 2024-06-30 RX ADMIN — SENNOSIDES AND DOCUSATE SODIUM 1 TABLET: 50; 8.6 TABLET ORAL at 08:53

## 2024-06-30 RX ADMIN — ASPIRIN 81 MG: 81 TABLET, COATED ORAL at 08:54

## 2024-06-30 RX ADMIN — POTASSIUM CHLORIDE 20 MEQ: 1500 TABLET, EXTENDED RELEASE ORAL at 08:54

## 2024-06-30 RX ADMIN — AMIODARONE HYDROCHLORIDE 200 MG: 200 TABLET ORAL at 21:32

## 2024-06-30 RX ADMIN — ATORVASTATIN CALCIUM 20 MG: 20 TABLET, FILM COATED ORAL at 21:32

## 2024-06-30 RX ADMIN — METOPROLOL TARTRATE 12.5 MG: 25 TABLET, FILM COATED ORAL at 08:54

## 2024-06-30 RX ADMIN — METOPROLOL TARTRATE 12.5 MG: 25 TABLET, FILM COATED ORAL at 21:32

## 2024-06-30 RX ADMIN — SODIUM CHLORIDE, PRESERVATIVE FREE 10 ML: 5 INJECTION INTRAVENOUS at 08:59

## 2024-06-30 RX ADMIN — DIPHENHYDRAMINE HYDROCHLORIDE 25 MG: 25 TABLET ORAL at 23:36

## 2024-06-30 RX ADMIN — OXYCODONE 5 MG: 5 TABLET ORAL at 04:22

## 2024-06-30 RX ADMIN — AMIODARONE HYDROCHLORIDE 200 MG: 200 TABLET ORAL at 13:52

## 2024-06-30 RX ADMIN — FUROSEMIDE 40 MG: 10 INJECTION, SOLUTION INTRAMUSCULAR; INTRAVENOUS at 08:55

## 2024-06-30 RX ADMIN — CLOPIDOGREL BISULFATE 75 MG: 75 TABLET ORAL at 08:54

## 2024-06-30 RX ADMIN — AMIODARONE HYDROCHLORIDE 200 MG: 200 TABLET ORAL at 08:54

## 2024-06-30 RX ADMIN — SODIUM CHLORIDE, PRESERVATIVE FREE 10 ML: 5 INJECTION INTRAVENOUS at 21:33

## 2024-06-30 RX ADMIN — DIPHENHYDRAMINE HYDROCHLORIDE 25 MG: 25 TABLET ORAL at 00:18

## 2024-06-30 RX ADMIN — OXYCODONE 10 MG: 5 TABLET ORAL at 12:45

## 2024-06-30 RX ADMIN — POTASSIUM CHLORIDE 20 MEQ: 1500 TABLET, EXTENDED RELEASE ORAL at 21:32

## 2024-06-30 RX ADMIN — LISINOPRIL 2.5 MG: 5 TABLET ORAL at 08:53

## 2024-06-30 RX ADMIN — OXYCODONE 10 MG: 5 TABLET ORAL at 21:32

## 2024-06-30 RX ADMIN — PANTOPRAZOLE SODIUM 40 MG: 40 TABLET, DELAYED RELEASE ORAL at 08:54

## 2024-06-30 ASSESSMENT — PAIN DESCRIPTION - PAIN TYPE
TYPE: SURGICAL PAIN

## 2024-06-30 ASSESSMENT — PAIN DESCRIPTION - ORIENTATION
ORIENTATION: MID;ANTERIOR
ORIENTATION: MID
ORIENTATION: ANTERIOR;MID
ORIENTATION: MID;ANTERIOR

## 2024-06-30 ASSESSMENT — PAIN DESCRIPTION - DESCRIPTORS
DESCRIPTORS: ACHING;DISCOMFORT
DESCRIPTORS: ACHING
DESCRIPTORS: BURNING;ACHING;DISCOMFORT
DESCRIPTORS: ACHING
DESCRIPTORS: ACHING

## 2024-06-30 ASSESSMENT — PAIN SCALES - GENERAL
PAINLEVEL_OUTOF10: 4
PAINLEVEL_OUTOF10: 5
PAINLEVEL_OUTOF10: 6
PAINLEVEL_OUTOF10: 7
PAINLEVEL_OUTOF10: 4
PAINLEVEL_OUTOF10: 5
PAINLEVEL_OUTOF10: 7
PAINLEVEL_OUTOF10: 6
PAINLEVEL_OUTOF10: 2

## 2024-06-30 ASSESSMENT — PAIN DESCRIPTION - LOCATION
LOCATION: INCISION
LOCATION: STERNUM
LOCATION: STERNUM
LOCATION: INCISION
LOCATION: INCISION
LOCATION: STERNUM

## 2024-06-30 ASSESSMENT — PAIN - FUNCTIONAL ASSESSMENT: PAIN_FUNCTIONAL_ASSESSMENT: ACTIVITIES ARE NOT PREVENTED

## 2024-06-30 ASSESSMENT — PAIN SCALES - WONG BAKER: WONGBAKER_NUMERICALRESPONSE: NO HURT

## 2024-06-30 NOTE — PLAN OF CARE
Problem: Safety - Adult  Goal: Free from fall injury  Outcome: Progressing     Problem: Chronic Conditions and Co-morbidities  Goal: Patient's chronic conditions and co-morbidity symptoms are monitored and maintained or improved  Outcome: Progressing     Problem: Discharge Planning  Goal: Discharge to home or other facility with appropriate resources  Outcome: Progressing     Problem: ABCDS Injury Assessment  Goal: Absence of physical injury  Outcome: Progressing     Problem: Respiratory - Adult  Goal: Achieves optimal ventilation and oxygenation  Outcome: Progressing     Problem: Pain  Goal: Verbalizes/displays adequate comfort level or baseline comfort level  Outcome: Progressing     Problem: Skin/Tissue Integrity  Goal: Absence of new skin breakdown  Description: 1.  Monitor for areas of redness and/or skin breakdown  2.  Assess vascular access sites hourly  3.  Every 4-6 hours minimum:  Change oxygen saturation probe site  4.  Every 4-6 hours:  If on nasal continuous positive airway pressure, respiratory therapy assess nares and determine need for appliance change or resting period.  Outcome: Progressing     Problem: Nutrition Deficit:  Goal: Optimize nutritional status  Outcome: Progressing

## 2024-06-30 NOTE — PROGRESS NOTES
[facility] Cardiothoracic Surgery  Daily Progress Note          Surgeon:  Dr. Renee       Subjective:  Mr. Min is a 49 y.o. year-old male status post CABG X 3 (LIMA to LAD, RSVG1 to OM and RSVG2 to RPDA -->small, chronically occluded vessel) on 6/24/24.     Patient was seen and examined at bedside this morning without any complaints. Doing well.       Vital Signs: /68   Pulse 79   Temp 98.1 °F (36.7 °C) (Oral)   Resp 20   Ht 1.68 m (5' 6.14\")   Wt 120.2 kg (264 lb 15.9 oz)   SpO2 (!) 88%   BMI 42.59 kg/m²  O2 Flow Rate (L/min): 4 L/min   Admit Weight: Weight - Scale: 113.1 kg (249 lb 5.4 oz)   WEIGHTWeight - Scale: 120.2 kg (264 lb 15.9 oz)     I/O's:  I/O last 3 completed shifts:  In: 1870 [P.O.:1870]  Out: 3550 [Urine:3550]    Data:    CBC:   Recent Labs     06/28/24  0546 06/29/24  0757 06/30/24  0826   WBC 13.4* 10.6 12.5*   HGB 10.8* 10.7* 11.0*   HCT 36.9* 33.8* 35.2*   MCV 96.1 89.4 90.7    245 292       BMP:   Recent Labs     06/28/24  0546 06/29/24  0757 06/30/24  0826   * 139 139   K 4.2 3.8 3.9    103 102   CO2 20 26 25   BUN 16 15 16   CREATININE 0.8 0.9 0.9       PT/INR:   No results for input(s): \"PROTIME\", \"INR\" in the last 72 hours.    APTT:   No results for input(s): \"APTT\" in the last 72 hours.      Chest X-Ray: 6/30/24  EXAMINATION:  ONE XRAY VIEW OF THE CHEST     6/30/2024 6:51 am     COMPARISON:  06/29/2024, 06/28/2024     HISTORY:  ORDERING SYSTEM PROVIDED HISTORY: Post op open heart surgery  TECHNOLOGIST PROVIDED HISTORY:  Post op open heart surgery     FINDINGS:  The cardiac and mediastinal contours appear unchanged. Vascular congestion,  left basilar opacity and effusions, left greater than right, appear without  significant change.  No new airspace disease identified in the interval.  No  evidence for pneumothorax.     IMPRESSION:  No significant interval change.    Scheduled Meds:    furosemide  40 mg IntraVENous BID    potassium  electrolytes per protocol   - Strict sternal precautions   - GI Prophylaxis  - Cardiac diet  - OOB to chair - Ambulation with PT 3x daily  - Incentive Spirometry / Pulmonary hygiene  - Pain management PRN  - Discharge planning home tomorrow   - Further recommendations as per Dr. Devine    The above recommendations including medications and orders were discussed and agreed upon with Dr. Nilson Smith MBA, PA-C

## 2024-06-30 NOTE — PROGRESS NOTES
\"CHOL\", \"HDL\" in the last 72 hours.    Invalid input(s): \"LDLCALCU\"  INR:   No results for input(s): \"INR\" in the last 72 hours.          Objective:     Vitals: /75   Pulse 81   Temp 98.3 °F (36.8 °C) (Oral)   Resp 20   Ht 1.68 m (5' 6.14\")   Wt 120.2 kg (264 lb 15.9 oz)   SpO2 (!) 86%   BMI 42.59 kg/m²     General appearance: Alert, oriented, on 2 L nasal cannula oxygen  HEENT: Head: Normocephalic, no lesions, without obvious abnormality  Neck: no JVD  Lungs: Reduced air entry at the bases with inspiratory rales - improved   Heart: regular rate and rhythm, S1, S2 normal,  Sternal incision with dressing  Abdomen: soft, non-tender; bowel sounds normal  Extremities: 1+ bilateral lower extremity edema  Neurologic: Mental status: Intubated and sedated.  Neuro not done.      Cardiac testing:     EKG  sinus tachycardia, IVCD               Echocardiogram 6/16/2024:    Left Ventricle: Severely reduced left ventricular systolic function with a visually estimated EF of 15 - 20%. Left ventricle is mildly dilated. Increased wall thickness. Findings consistent with mild eccentric hypertrophy. Severe global hypokinesis present, with minor regional variation. Grade III diastolic dysfunction with increased LAP.    Right Ventricle: Right ventricle size is normal. Moderately reduced systolic function.    Aortic Valve: Mild regurgitation.    Mitral Valve: Mildly thickened leaflet. Mild regurgitation.    Tricuspid Valve: Mild regurgitation. Mildly elevated RVSP, consistent with mild pulmonary hypertension. The estimated RVSP is 46 mmHg.    Pulmonic Valve: Mild regurgitation.    Image quality is adequate.    Coronary Angiography 6/17/24:     Severe Multivessel coronary artery disease    Severely impaired left ventricular function with estimated EF 20-25%.    Limited TTE 6/18/24    Left Ventricle: Severely reduced left ventricular systolic function with a visually estimated EF of 15 - 20%. EF by 2D Simpsons Biplane is 23%.  Left ventricle is dilated. Severe global hypokinesis present.    Image quality is good.     Impression:   Acute coronary syndrome/NSTEMI  Severe multivessel coronary artery disease on [facility]  S/p CABG x 3 wit follow-up h LIMA-LAD, SVG-OM and SVG-RPDA ON 6/24/24  New onset heart failure with reduced ejection fraction  Severe ischemic cardiomyopathy, LVEF  ~ 20-25%, post op   Grade 3 diastolic dysfunction  Mild mitral and tricuspid regurgitation  Chronic active smoker  Postop leukocytosis an normal d anemia        Plan:   Routine post op care per CTS   Continue IV lasix 40 mg bid for another day. Will switch to po tomorrow   Continue aspirin, Plavix, statin, beta-blocker and ACEI  Unable to add aldactone at this time due to borderline low BP  Will add SGLT-2 I on discharge  Incentive spirometry  PT/OT  Patient will need LifeVest on discharge    Makenzie Benavidez MD [facility]

## 2024-06-30 NOTE — PLAN OF CARE
Problem: Safety - Adult  Goal: Free from fall injury  6/30/2024 1614 by Antonia Zuñiga, RN  Outcome: Progressing     Problem: Pain  Goal: Verbalizes/displays adequate comfort level or baseline comfort level  6/30/2024 1614 by Antonia Zuñiga, RN  Outcome: Progressing

## 2024-07-01 ENCOUNTER — APPOINTMENT (OUTPATIENT)
Dept: GENERAL RADIOLOGY | Age: 49
End: 2024-07-01
Attending: STUDENT IN AN ORGANIZED HEALTH CARE EDUCATION/TRAINING PROGRAM
Payer: COMMERCIAL

## 2024-07-01 VITALS
SYSTOLIC BLOOD PRESSURE: 96 MMHG | OXYGEN SATURATION: 91 % | BODY MASS INDEX: 42.59 KG/M2 | WEIGHT: 264.99 LBS | HEIGHT: 66 IN | TEMPERATURE: 98.4 F | RESPIRATION RATE: 16 BRPM | HEART RATE: 79 BPM | DIASTOLIC BLOOD PRESSURE: 65 MMHG

## 2024-07-01 LAB
ANION GAP SERPL CALCULATED.3IONS-SCNC: 8 MMOL/L (ref 9–16)
BUN SERPL-MCNC: 16 MG/DL (ref 6–20)
CALCIUM SERPL-MCNC: 9.2 MG/DL (ref 8.6–10.4)
CHLORIDE SERPL-SCNC: 102 MMOL/L (ref 98–107)
CO2 SERPL-SCNC: 30 MMOL/L (ref 20–31)
CREAT SERPL-MCNC: 1.1 MG/DL (ref 0.7–1.2)
ERYTHROCYTE [DISTWIDTH] IN BLOOD BY AUTOMATED COUNT: 15.2 % (ref 11.8–14.4)
GFR, ESTIMATED: 85 ML/MIN/1.73M2
GLUCOSE BLD-MCNC: 106 MG/DL (ref 75–110)
GLUCOSE BLD-MCNC: 142 MG/DL (ref 75–110)
GLUCOSE SERPL-MCNC: 97 MG/DL (ref 74–99)
HCT VFR BLD AUTO: 33.4 % (ref 40.7–50.3)
HGB BLD-MCNC: 10.3 G/DL (ref 13–17)
MAGNESIUM SERPL-MCNC: 2.1 MG/DL (ref 1.6–2.6)
MCH RBC QN AUTO: 27.5 PG (ref 25.2–33.5)
MCHC RBC AUTO-ENTMCNC: 30.8 G/DL (ref 28.4–34.8)
MCV RBC AUTO: 89.3 FL (ref 82.6–102.9)
NRBC BLD-RTO: 0.2 PER 100 WBC
PLATELET # BLD AUTO: 338 K/UL (ref 138–453)
PMV BLD AUTO: 9.4 FL (ref 8.1–13.5)
POTASSIUM SERPL-SCNC: 4.1 MMOL/L (ref 3.7–5.3)
RBC # BLD AUTO: 3.74 M/UL (ref 4.21–5.77)
SODIUM SERPL-SCNC: 140 MMOL/L (ref 136–145)
WBC OTHER # BLD: 15.4 K/UL (ref 3.5–11.3)

## 2024-07-01 PROCEDURE — 94761 N-INVAS EAR/PLS OXIMETRY MLT: CPT

## 2024-07-01 PROCEDURE — 85027 COMPLETE CBC AUTOMATED: CPT

## 2024-07-01 PROCEDURE — 97110 THERAPEUTIC EXERCISES: CPT

## 2024-07-01 PROCEDURE — 82947 ASSAY GLUCOSE BLOOD QUANT: CPT

## 2024-07-01 PROCEDURE — 99233 SBSQ HOSP IP/OBS HIGH 50: CPT | Performed by: INTERNAL MEDICINE

## 2024-07-01 PROCEDURE — 97535 SELF CARE MNGMENT TRAINING: CPT

## 2024-07-01 PROCEDURE — 36415 COLL VENOUS BLD VENIPUNCTURE: CPT

## 2024-07-01 PROCEDURE — 83735 ASSAY OF MAGNESIUM: CPT

## 2024-07-01 PROCEDURE — 2580000003 HC RX 258: Performed by: NURSE PRACTITIONER

## 2024-07-01 PROCEDURE — 97116 GAIT TRAINING THERAPY: CPT

## 2024-07-01 PROCEDURE — 6370000000 HC RX 637 (ALT 250 FOR IP): Performed by: NURSE PRACTITIONER

## 2024-07-01 PROCEDURE — 6370000000 HC RX 637 (ALT 250 FOR IP): Performed by: INTERNAL MEDICINE

## 2024-07-01 PROCEDURE — 80048 BASIC METABOLIC PNL TOTAL CA: CPT

## 2024-07-01 PROCEDURE — 71045 X-RAY EXAM CHEST 1 VIEW: CPT

## 2024-07-01 PROCEDURE — 6360000002 HC RX W HCPCS: Performed by: INTERNAL MEDICINE

## 2024-07-01 PROCEDURE — 97530 THERAPEUTIC ACTIVITIES: CPT

## 2024-07-01 PROCEDURE — 6370000000 HC RX 637 (ALT 250 FOR IP): Performed by: PHYSICIAN ASSISTANT

## 2024-07-01 RX ORDER — TRAMADOL HYDROCHLORIDE 50 MG/1
50 TABLET ORAL EVERY 6 HOURS PRN
Qty: 20 TABLET | Refills: 0 | Status: SHIPPED | OUTPATIENT
Start: 2024-07-01 | End: 2024-07-08

## 2024-07-01 RX ORDER — ATORVASTATIN CALCIUM 20 MG/1
20 TABLET, FILM COATED ORAL NIGHTLY
Qty: 30 TABLET | Refills: 3 | Status: SHIPPED | OUTPATIENT
Start: 2024-07-01

## 2024-07-01 RX ORDER — LISINOPRIL 2.5 MG/1
2.5 TABLET ORAL DAILY
Qty: 30 TABLET | Refills: 3 | Status: SHIPPED | OUTPATIENT
Start: 2024-07-02

## 2024-07-01 RX ORDER — AMIODARONE HYDROCHLORIDE 200 MG/1
200 TABLET ORAL 2 TIMES DAILY
Status: DISCONTINUED | OUTPATIENT
Start: 2024-07-01 | End: 2024-07-01 | Stop reason: HOSPADM

## 2024-07-01 RX ORDER — SENNA AND DOCUSATE SODIUM 50; 8.6 MG/1; MG/1
1 TABLET, FILM COATED ORAL 2 TIMES DAILY
Qty: 20 TABLET | Refills: 0 | Status: SHIPPED | OUTPATIENT
Start: 2024-07-01 | End: 2024-07-11

## 2024-07-01 RX ORDER — ASPIRIN 81 MG/1
81 TABLET ORAL DAILY
Qty: 30 TABLET | Refills: 3 | Status: SHIPPED | OUTPATIENT
Start: 2024-07-02

## 2024-07-01 RX ORDER — ACETAMINOPHEN AND CODEINE PHOSPHATE 300; 30 MG/1; MG/1
1 TABLET ORAL EVERY 4 HOURS PRN
Qty: 30 TABLET | Refills: 0 | Status: SHIPPED | OUTPATIENT
Start: 2024-07-01 | End: 2024-07-01 | Stop reason: HOSPADM

## 2024-07-01 RX ORDER — FUROSEMIDE 40 MG/1
40 TABLET ORAL DAILY
Status: DISCONTINUED | OUTPATIENT
Start: 2024-07-02 | End: 2024-07-01 | Stop reason: HOSPADM

## 2024-07-01 RX ORDER — POTASSIUM CHLORIDE 20 MEQ/1
20 TABLET, EXTENDED RELEASE ORAL DAILY
Qty: 90 TABLET | Refills: 1 | Status: SHIPPED | OUTPATIENT
Start: 2024-07-01

## 2024-07-01 RX ORDER — FUROSEMIDE 40 MG/1
40 TABLET ORAL DAILY
Qty: 60 TABLET | Refills: 3 | Status: SHIPPED | OUTPATIENT
Start: 2024-07-02

## 2024-07-01 RX ORDER — AMIODARONE HYDROCHLORIDE 200 MG/1
200 TABLET ORAL 2 TIMES DAILY
Qty: 60 TABLET | Refills: 0 | Status: SHIPPED | OUTPATIENT
Start: 2024-07-01 | End: 2024-07-31

## 2024-07-01 RX ORDER — CLOPIDOGREL BISULFATE 75 MG/1
75 TABLET ORAL DAILY
Qty: 30 TABLET | Refills: 3 | Status: SHIPPED | OUTPATIENT
Start: 2024-07-02

## 2024-07-01 RX ADMIN — SODIUM CHLORIDE, PRESERVATIVE FREE 10 ML: 5 INJECTION INTRAVENOUS at 08:15

## 2024-07-01 RX ADMIN — ACETAMINOPHEN 650 MG: 325 TABLET ORAL at 16:51

## 2024-07-01 RX ADMIN — CLOPIDOGREL BISULFATE 75 MG: 75 TABLET ORAL at 08:15

## 2024-07-01 RX ADMIN — OXYCODONE 10 MG: 5 TABLET ORAL at 03:22

## 2024-07-01 RX ADMIN — PANTOPRAZOLE SODIUM 40 MG: 40 TABLET, DELAYED RELEASE ORAL at 08:15

## 2024-07-01 RX ADMIN — LISINOPRIL 2.5 MG: 5 TABLET ORAL at 08:15

## 2024-07-01 RX ADMIN — FUROSEMIDE 40 MG: 10 INJECTION, SOLUTION INTRAMUSCULAR; INTRAVENOUS at 08:14

## 2024-07-01 RX ADMIN — AMIODARONE HYDROCHLORIDE 200 MG: 200 TABLET ORAL at 08:15

## 2024-07-01 RX ADMIN — EMPAGLIFLOZIN 10 MG: 10 TABLET, FILM COATED ORAL at 13:45

## 2024-07-01 RX ADMIN — POTASSIUM CHLORIDE 20 MEQ: 1500 TABLET, EXTENDED RELEASE ORAL at 08:15

## 2024-07-01 RX ADMIN — ASPIRIN 81 MG: 81 TABLET, COATED ORAL at 08:15

## 2024-07-01 ASSESSMENT — PAIN SCALES - GENERAL
PAINLEVEL_OUTOF10: 4
PAINLEVEL_OUTOF10: 3
PAINLEVEL_OUTOF10: 7
PAINLEVEL_OUTOF10: 5

## 2024-07-01 ASSESSMENT — PAIN DESCRIPTION - DESCRIPTORS: DESCRIPTORS: ACHING;DISCOMFORT

## 2024-07-01 ASSESSMENT — PAIN DESCRIPTION - LOCATION: LOCATION: STERNUM

## 2024-07-01 ASSESSMENT — PAIN DESCRIPTION - ORIENTATION: ORIENTATION: MID;ANTERIOR

## 2024-07-01 NOTE — PROGRESS NOTES
Occupational Therapy  Facility/Department: [facility] CAR 2- STEPDOWN  Occupational Therapy Daily Treatment Note      Name: Jesús Min  : 1975  MRN: [id number]  Date of Service: 2024    Discharge Recommendations:  Patient would benefit from continued therapy after discharge  OT Equipment Recommendations  Walker: Rolling       Patient Diagnosis(es): The primary encounter diagnosis was CAD, multiple vessel. Diagnoses of Acute on chronic systolic (congestive) heart failure (HCC) and S/P CABG x 3 were also pertinent to this visit.  Past Medical History:  has a past medical history of CHF (congestive heart failure) (HCC) and Heart attack (HCC).  Past Surgical History:  has a past surgical history that includes Cardiac catheterization; Cardiac procedure (N/A, 2024); and Coronary artery bypass graft (N/A, 2024).    Treatment Diagnosis: CABG X3      Assessment   Performance deficits / Impairments: Decreased functional mobility ;Decreased ADL status;Decreased balance;Decreased high-level IADLs;Decreased endurance;Decreased safe awareness;Decreased strength  Assessment: Pt would benefit from continued acute care and post acute care OT to address above listed deficits.  Treatment Diagnosis: CABG X3  Prognosis: Good  Activity Tolerance  Activity Tolerance: Patient Tolerated treatment well;Patient limited by fatigue;Patient limited by pain        Plan   Occupational Therapy Plan  Times Per Week: 4-6x/wk (CABG)  Current Treatment Recommendations: Balance training, Functional mobility training, Endurance training, Patient/Caregiver education & training, Equipment evaluation, education, & procurement, Return to work related activity, Safety education & training, Home management training, Self-Care / ADL     Restrictions  Restrictions/Precautions  Restrictions/Precautions: Fall Risk, General Precautions, Up as Tolerated, Cardiac, ROM Restrictions, Surgical Protocols  Required Braces or Orthoses?: Yes  Required

## 2024-07-01 NOTE — PROGRESS NOTES
Patient was given discharge instructions and given education on follow up care and medication and post op care. Patient verbalized understanding. Pacer wires and IV were removed. Patient was discharged with life vest and heart hugger on. Patient left with all personal belongings.

## 2024-07-01 NOTE — PROGRESS NOTES
Nurse notified, Patient at risk for falls, Left in bed  Restraints  Restraints Initially in Place: No     Restrictions  Restrictions/Precautions  Restrictions/Precautions: Fall Risk, General Precautions, Up as Tolerated, Cardiac  Required Braces or Orthoses?: Yes  Required Braces or Orthoses  Other: Heart Hugger Brace  Position Activity Restriction  Sternal Precautions: No Pushing, No Pulling, 5# Lifting Restrictions  Other position/activity restrictions: amb. pt, up w/A, up in chair, s/p CABGx3 6/24     Subjective   General  Patient assessed for rehabilitation services?: Yes  Response To Previous Treatment: Patient with no complaints from previous session.  Family / Caregiver Present: No  Follows Commands: Within Functional Limits  General Comment  Comments: pt laying supine in bed when writer enterd room. pt left sitting EOB with STOVER in room end of session. stover will give call light and bedside table end of session.  Subjective  Subjective: RN and pt agreeable to work with therapy. pt complains of incisional pain 3/10. pt repositioned for comfort.     Cognition   Orientation  Overall Orientation Status: Within Functional Limits  Orientation Level: Oriented X4  Cognition  Overall Cognitive Status: WFL     Objective     Bed mobility  Supine to Sit: Stand by assistance  Sit to Supine: Unable to assess  Bed Mobility Comments: HOB elevated slightly ~25 degrees.  Transfers  Sit to Stand: Stand by assistance  Stand to Sit: Stand by assistance  Comment: Assessed to RW.  vc for use of heart hugger with good return.  Ambulation  Surface: Level tile  Device: Rolling Walker  Assistance: Stand by assistance  Quality of Gait: vc for pt to keep BUE on RW when ambulating. Making sure to stop ambulating when feeling need to talk with hands.  Distance: 350 feet x1  Comments: CGA for begining of gait, switching to SBA due to demonstration of steady gait pattern with no LOB.  Stairs/Curb  Stairs?: Yes  Stairs  # Steps : 2  Stairs  Given To: Patient  Education Provided: Role of Therapy;Plan of Care;Equipment;Precautions;Fall Prevention Strategies;Transfer Training;Home Exercise Program;Energy Conservation  Education Provided Comments: All HEP packet given to pt and reviewed. Safety with transfers and ambulating. Breathing technqiues.  Education Method: Demonstration;Verbal  Barriers to Learning: None  Education Outcome: Verbalized understanding;Demonstrated understanding      Therapy Time   Individual Concurrent Group Co-treatment   Time In 1447         Time Out 1525         Minutes 38         Timed Code Treatment Minutes: 38 Minutes       Merle Mathews, PTA

## 2024-07-01 NOTE — CARE COORDINATION
Discharge Report    [facility]  Clinical Case Management Department  Written by: GANGA SIMMONS    Patient Name: Jesús Min  Attending Provider: Ricardo James MD  Admit Date: 2024  3:00 PM  MRN: [id number]  Account: [id number]                     : 1975  Discharge Date: 24      Disposition: home    GANGA SIMMONS

## 2024-07-01 NOTE — PLAN OF CARE
Problem: Safety - Adult  Goal: Free from fall injury  7/1/2024 0021 by Sharan Kay RN  Outcome: Progressing     Problem: Chronic Conditions and Co-morbidities  Goal: Patient's chronic conditions and co-morbidity symptoms are monitored and maintained or improved  Outcome: Progressing     Problem: Discharge Planning  Goal: Discharge to home or other facility with appropriate resources  Outcome: Progressing     Problem: ABCDS Injury Assessment  Goal: Absence of physical injury  Outcome: Progressing     Problem: Respiratory - Adult  Goal: Achieves optimal ventilation and oxygenation  Outcome: Progressing     Problem: Pain  Goal: Verbalizes/displays adequate comfort level or baseline comfort level  7/1/2024 0021 by Sharan Kay RN  Outcome: Progressing     Problem: Skin/Tissue Integrity  Goal: Absence of new skin breakdown  Description: 1.  Monitor for areas of redness and/or skin breakdown  2.  Assess vascular access sites hourly  3.  Every 4-6 hours minimum:  Change oxygen saturation probe site  4.  Every 4-6 hours:  If on nasal continuous positive airway pressure, respiratory therapy assess nares and determine need for appliance change or resting period.  Outcome: Progressing     Problem: Nutrition Deficit:  Goal: Optimize nutritional status  Outcome: Progressing

## 2024-07-01 NOTE — DISCHARGE SUMMARY
Cardiothoracic Surgery  IN-PATIENT SERVICE   [facility]    Discharge Summary     Patient ID: Jesús Min  :  1975   MRN: [id number]     ACCOUNT:  [id number]   Patient's PCP: No primary care provider on file.  Admit Date: 2024   Discharge Date: 2024    Length of Stay: 13  Code Status:  Full Code  Admitting Physician: Ricardo James MD  Discharge Physician: LAURIE Gilliam     Active Discharge Diagnoses:     Hospital Problem Lists:  Principal Problem:    New onset of congestive heart failure (HCC)  Active Problems:    S/P CABG x 3    HFrEF (heart failure with reduced ejection fraction) ([facility])    NSTEMI (non-ST elevated myocardial infarction) ([facility])    CAD, multiple vessel    Mild mitral regurgitation    Mild tricuspid regurgitation    Dyslipidemia    Class 3 severe obesity due to excess calories with serious comorbidity and body mass index (BMI) of 40.0 to 44.9 in adult (HCC)    Tobacco abuse    Moderate sized pleural effusion    Acute on chronic systolic (congestive) heart failure (HCC)  Resolved Problems:    * No resolved hospital problems. *      Admission Condition:  good     Discharged Condition: good    Hospital Stay:     HPI: Jesús Min is a 49 y.o. male who presented to cardiothoracic surgery multivessel CAD.  Patient came to [place] emergency department with chest pain.  After further evaluation patient noted to have multivessel CAD and began transfer process to [facility] for bypass surgery.  Patient noted to have a low ejection fraction.  Beginning congestive heart failure management.  Anticipate repeat echocardiogram later this week.     She had presented to [facility] with CHF exacerbation.  Has not follow-up with primary care in a long time.     Patient is currently resting in bed with no chest pain or shortness of breath.  Alert and oriented x 4.  Educated him on the importance of a fluid restriction beginning now.  Will  tablet         Discharge Procedure Orders   XR CHEST (2 VW)   Standing Status: Future Standing Exp. Date: 07/01/25     Order Specific Question Answer Comments   Reason for exam: s/p CABG      CBC   Standing Status: Future Standing Exp. Date: 07/01/25     Comprehensive Metabolic Panel   Standing Status: Future Standing Exp. Date: 07/01/25     AMB REFERRAL TO HEART FAILURE CLINIC   Referral Priority: Routine Referral Type: Eval and Treat   Referral Reason: Specialty Services Required   Number of Visits Requested: 1     [facility]   Referral Priority: Routine Referral Type: Eval and Treat   Referral Reason: Specialty Services Required   Requested Specialty: Cardiac Rehabilitation   Number of Visits Requested: 1           Electronically signed by:   Paddy Smith MBA, PA-C   7/1/2024  1:21 PM

## 2024-07-01 NOTE — PLAN OF CARE
Problem: Safety - Adult  Goal: Free from fall injury  Outcome: Adequate for Discharge     Problem: Chronic Conditions and Co-morbidities  Goal: Patient's chronic conditions and co-morbidity symptoms are monitored and maintained or improved  Outcome: Adequate for Discharge     Problem: Discharge Planning  Goal: Discharge to home or other facility with appropriate resources  Outcome: Adequate for Discharge     Problem: ABCDS Injury Assessment  Goal: Absence of physical injury  Outcome: Adequate for Discharge     Problem: Respiratory - Adult  Goal: Achieves optimal ventilation and oxygenation  Outcome: Adequate for Discharge     Problem: Pain  Goal: Verbalizes/displays adequate comfort level or baseline comfort level  Outcome: Adequate for Discharge     Problem: Skin/Tissue Integrity  Goal: Absence of new skin breakdown  Description: 1.  Monitor for areas of redness and/or skin breakdown  2.  Assess vascular access sites hourly  3.  Every 4-6 hours minimum:  Change oxygen saturation probe site  4.  Every 4-6 hours:  If on nasal continuous positive airway pressure, respiratory therapy assess nares and determine need for appliance change or resting period.  Outcome: Adequate for Discharge     Problem: Nutrition Deficit:  Goal: Optimize nutritional status  Outcome: Adequate for Discharge

## 2024-07-02 ENCOUNTER — TELEPHONE (OUTPATIENT)
Dept: OTHER | Age: 49
End: 2024-07-02

## 2024-07-02 NOTE — TELEPHONE ENCOUNTER
Patient called us back would rather go to [facility] CHF clinic closer to his house,called Julissa at [facility] gave her info on  she will call him to get scheduled.

## 2024-07-02 NOTE — TELEPHONE ENCOUNTER
Patient's number unable to leave message called wife Ginna  had to leave message to call us if interested in CHF clinic referral to make appt.

## 2024-07-05 ENCOUNTER — TELEPHONE (OUTPATIENT)
Dept: ADMINISTRATIVE | Age: 49
End: 2024-07-05

## 2024-07-05 NOTE — TELEPHONE ENCOUNTER
Patient was just released from [facility] 7/1/2024 from open heart surgery needs to be seen no later than 7/15/2024 with Dr Benavidez    Please contact patient to schedule.

## 2024-07-10 ENCOUNTER — OFFICE VISIT (OUTPATIENT)
Dept: PRIMARY CARE CLINIC | Age: 49
End: 2024-07-10
Payer: COMMERCIAL

## 2024-07-10 VITALS
BODY MASS INDEX: 38.89 KG/M2 | DIASTOLIC BLOOD PRESSURE: 78 MMHG | OXYGEN SATURATION: 96 % | SYSTOLIC BLOOD PRESSURE: 112 MMHG | WEIGHT: 242 LBS | HEART RATE: 81 BPM

## 2024-07-10 DIAGNOSIS — I25.10 CAD, MULTIPLE VESSEL: Primary | ICD-10-CM

## 2024-07-10 DIAGNOSIS — Z95.1 S/P CABG (CORONARY ARTERY BYPASS GRAFT): ICD-10-CM

## 2024-07-10 DIAGNOSIS — J44.9 CHRONIC OBSTRUCTIVE PULMONARY DISEASE, UNSPECIFIED COPD TYPE (HCC): ICD-10-CM

## 2024-07-10 DIAGNOSIS — R52 PAIN: ICD-10-CM

## 2024-07-10 DIAGNOSIS — I50.20 HFREF (HEART FAILURE WITH REDUCED EJECTION FRACTION) (HCC): ICD-10-CM

## 2024-07-10 PROCEDURE — G8427 DOCREV CUR MEDS BY ELIG CLIN: HCPCS | Performed by: FAMILY MEDICINE

## 2024-07-10 PROCEDURE — 99204 OFFICE O/P NEW MOD 45 MIN: CPT | Performed by: FAMILY MEDICINE

## 2024-07-10 PROCEDURE — G8417 CALC BMI ABV UP PARAM F/U: HCPCS | Performed by: FAMILY MEDICINE

## 2024-07-10 PROCEDURE — 1111F DSCHRG MED/CURRENT MED MERGE: CPT | Performed by: FAMILY MEDICINE

## 2024-07-10 PROCEDURE — 4004F PT TOBACCO SCREEN RCVD TLK: CPT | Performed by: FAMILY MEDICINE

## 2024-07-10 PROCEDURE — 3023F SPIROM DOC REV: CPT | Performed by: FAMILY MEDICINE

## 2024-07-10 RX ORDER — ACETAMINOPHEN 500 MG
1000 TABLET ORAL 3 TIMES DAILY PRN
Qty: 120 TABLET | Refills: 1 | Status: SHIPPED | OUTPATIENT
Start: 2024-07-10

## 2024-07-10 NOTE — PROGRESS NOTES
[facility]  [facility]  [address] [place] [address] [address]  [address]  [facility] [address]  Dept: [phone]  Dept Fax: [phone]    Jesús Min is a 49 y.o. male who presents today for his medical conditions/complaints as noted below.  Jesús Min is c/o of  Chief Complaint   Patient presents with    New Patient    Health Maintenance     CRC    Chest Pain     Pt has pain in his chest from heart surgery, where he was cut open, was prescribed hard medications and does not want any addictive medications, surgery was a few weeks ago         HPI:     HPI  Patient here establish care with new PCP.      Went to ER 6/15 with chest pain and had multivessel disease.  Required CABG.    Feeling soreness deep inside where his  surgery was in his sternum.  Incision looks well-healed.   Will be doing labs next week. -Has chest xray next week as well.  He will be following up with cardiothoracic surgery and cardiology.  Has slight swelling in his left leg where he had the graft taken from but is healing well.      He was also diagnosed with possible COPD in May when he was in the ER so we discussed considering a PFT test at some point in a few months.    Had albuterol inhaler from ER visit.  He has cut back on smoking down to a cig every few days        Hemoglobin A1C (%)   Date Value   06/23/2024 6.1 (H)             ( goal A1C is < 7)   No components found for: \"LABMICR\"  No components found for: \"LDLCHOLESTEROL\", \"LDLCALC\"    (goal LDL is <100)   AST (U/L)   Date Value   06/15/2024 26     ALT (U/L)   Date Value   06/15/2024 50 (H)     BUN (mg/dL)   Date Value   07/01/2024 16     BP Readings from Last 3 Encounters:   07/10/24 112/78   07/01/24 96/65   06/18/24 104/75          (goal 120/80)    Past Medical History:   Diagnosis Date    CHF (congestive heart failure) (HCC)     Heart attack (HCC)       Past Surgical History:   Procedure Laterality Date    CARDIAC CATHETERIZATION      with stent

## 2024-07-16 ENCOUNTER — HOSPITAL ENCOUNTER (OUTPATIENT)
Age: 49
Setting detail: SPECIMEN
Discharge: HOME | End: 2024-07-16

## 2024-07-16 ENCOUNTER — HOSPITAL ENCOUNTER (OUTPATIENT)
Dept: OTHER | Age: 49
Discharge: HOME | End: 2024-07-16

## 2024-07-16 DIAGNOSIS — I50.20 HFREF (HEART FAILURE WITH REDUCED EJECTION FRACTION) (HCC): ICD-10-CM

## 2024-07-16 DIAGNOSIS — I50.9 NEW ONSET OF CONGESTIVE HEART FAILURE (HCC): Primary | ICD-10-CM

## 2024-07-16 NOTE — PROGRESS NOTES
Pt was no call/ no show for his CHF Clinic consultation apt today, 7/16/24. Called and left message for pt to call back and reschedule.

## 2024-07-19 NOTE — TELEPHONE ENCOUNTER
Called patient and left voice mail - made follow up appt for 8/9/24 @ 1:15p with Dr. Benavidez and if this appt does not work with patient to call our office at [phone].

## 2024-07-29 RX ORDER — AMIODARONE HYDROCHLORIDE 200 MG/1
200 TABLET ORAL 2 TIMES DAILY
Qty: 60 TABLET | Refills: 0 | OUTPATIENT
Start: 2024-07-29

## 2024-07-31 ENCOUNTER — HOSPITAL ENCOUNTER (OUTPATIENT)
Age: 49
Setting detail: SPECIMEN
Discharge: HOME | End: 2024-07-31

## 2024-08-12 ENCOUNTER — TELEPHONE (OUTPATIENT)
Age: 49
End: 2024-08-12

## 2024-08-12 NOTE — TELEPHONE ENCOUNTER
Patient stated he received a call to come in earlier, appointment scheduled for 11/26/2024, Dr. Benavidez, please call patient at [phone] [facility]/sc

## 2024-09-16 ENCOUNTER — TELEPHONE (OUTPATIENT)
Age: 49
End: 2024-09-16

## 2024-10-10 NOTE — PROGRESS NOTES
Cardiology Office Follow up      Jesús Min  1975  [id number]    Date: 10/11/24    CC: had concerns including Follow-Up from Hospital (Heart cath DOS 6/17/24).    HPI:   Jesús Mni  is here for routine follow up s/p CABGx3. He states he has had a major improvement in functional capacity. He is able to walk around and exert himself without getting SOB, dizzy, or lightheaded. He states overall he is feeling much better and staying compliant with his medications. He denies any episodes of CP, pressure, SOB, palpitations, or syncope.    Past Medical:  Past Medical History:   Diagnosis Date    CHF (congestive heart failure) (HCC)     Heart attack (HCC)          Past Surgical:  Past Surgical History:   Procedure Laterality Date    CARDIAC CATHETERIZATION      with stent placement    CARDIAC PROCEDURE N/A 6/17/2024    Left heart cath / coronary angiography performed by Makenzie Benavidez MD at [facility] Cardiac Cath/IR    CORONARY ARTERY BYPASS GRAFT N/A 6/24/2024    CORONARY ARTERY BYPASS GRAFT X3, POSSIBLE RADIAL HARVEST, ON PUMP, SWAN, NORTH performed by Ricardo James MD at [facility]         Family History:  No family history on file.    Social History:  Social History     Socioeconomic History    Marital status:      Spouse name: Ginna Min    Number of children: Not on file    Years of education: Not on file    Highest education level: Not on file   Occupational History    Not on file   Tobacco Use    Smoking status: Every Day     Current packs/day: 1.00     Types: Cigarettes    Smokeless tobacco: Never   Vaping Use    Vaping status: Never Used   Substance and Sexual Activity    Alcohol use: No    Drug use: Yes     Frequency: 1.0 times per week     Types: Marijuana (Weed)    Sexual activity: Not Currently     Partners: Female   Other Topics Concern    Not on file   Social History Narrative    Not on file     Social Determinants of Health     Financial Resource Strain:

## 2024-10-11 ENCOUNTER — OFFICE VISIT (OUTPATIENT)
Age: 49
End: 2024-10-11
Payer: COMMERCIAL

## 2024-10-11 VITALS
OXYGEN SATURATION: 97 % | SYSTOLIC BLOOD PRESSURE: 130 MMHG | WEIGHT: 240 LBS | HEART RATE: 83 BPM | BODY MASS INDEX: 38.57 KG/M2 | DIASTOLIC BLOOD PRESSURE: 84 MMHG

## 2024-10-11 DIAGNOSIS — I07.1 MILD TRICUSPID REGURGITATION: ICD-10-CM

## 2024-10-11 DIAGNOSIS — I25.10 CAD, MULTIPLE VESSEL: ICD-10-CM

## 2024-10-11 DIAGNOSIS — Z95.1 S/P CABG X 3: ICD-10-CM

## 2024-10-11 DIAGNOSIS — I50.20 HFREF (HEART FAILURE WITH REDUCED EJECTION FRACTION) (HCC): Primary | ICD-10-CM

## 2024-10-11 DIAGNOSIS — I34.0 MILD MITRAL REGURGITATION: ICD-10-CM

## 2024-10-11 DIAGNOSIS — E78.5 DYSLIPIDEMIA: ICD-10-CM

## 2024-10-11 PROCEDURE — 93000 ELECTROCARDIOGRAM COMPLETE: CPT

## 2024-10-11 PROCEDURE — G8427 DOCREV CUR MEDS BY ELIG CLIN: HCPCS

## 2024-10-11 PROCEDURE — 4004F PT TOBACCO SCREEN RCVD TLK: CPT

## 2024-10-11 PROCEDURE — G8484 FLU IMMUNIZE NO ADMIN: HCPCS

## 2024-10-11 PROCEDURE — G8417 CALC BMI ABV UP PARAM F/U: HCPCS

## 2024-10-11 PROCEDURE — 99214 OFFICE O/P EST MOD 30 MIN: CPT

## 2024-10-11 RX ORDER — ASPIRIN 81 MG/1
81 TABLET ORAL DAILY
Qty: 90 TABLET | Refills: 3 | Status: SHIPPED | OUTPATIENT
Start: 2024-10-11

## 2024-10-11 RX ORDER — POTASSIUM CHLORIDE 1500 MG/1
20 TABLET, EXTENDED RELEASE ORAL DAILY
Qty: 90 TABLET | Refills: 1 | Status: SHIPPED | OUTPATIENT
Start: 2024-10-11

## 2024-10-11 RX ORDER — CLOPIDOGREL BISULFATE 75 MG/1
75 TABLET ORAL DAILY
Qty: 90 TABLET | Refills: 5 | Status: SHIPPED | OUTPATIENT
Start: 2024-10-11

## 2024-10-11 RX ORDER — ATORVASTATIN CALCIUM 20 MG/1
20 TABLET, FILM COATED ORAL NIGHTLY
Qty: 90 TABLET | Refills: 3 | Status: SHIPPED | OUTPATIENT
Start: 2024-10-11

## 2024-10-11 RX ORDER — LISINOPRIL 2.5 MG/1
2.5 TABLET ORAL DAILY
Qty: 90 TABLET | Refills: 3 | Status: SHIPPED | OUTPATIENT
Start: 2024-10-11

## 2024-10-11 ASSESSMENT — ENCOUNTER SYMPTOMS
SHORTNESS OF BREATH: 0
CHEST TIGHTNESS: 0

## 2025-03-03 RX ORDER — FUROSEMIDE 40 MG/1
40 TABLET ORAL DAILY
Qty: 90 TABLET | OUTPATIENT
Start: 2025-03-03

## 2025-03-03 RX ORDER — METOPROLOL TARTRATE 25 MG/1
12.5 TABLET, FILM COATED ORAL 2 TIMES DAILY
Qty: 90 TABLET | OUTPATIENT
Start: 2025-03-03

## 2025-04-03 RX ORDER — FUROSEMIDE 40 MG/1
40 TABLET ORAL DAILY
Qty: 30 TABLET | OUTPATIENT
Start: 2025-04-03

## 2025-04-03 RX ORDER — METOPROLOL TARTRATE 25 MG/1
12.5 TABLET, FILM COATED ORAL 2 TIMES DAILY
Qty: 30 TABLET | OUTPATIENT
Start: 2025-04-03

## 2025-04-09 ENCOUNTER — HOSPITAL ENCOUNTER (INPATIENT)
Age: 50
LOS: 1 days | Discharge: HOME | DRG: 720 | End: 2025-04-11
Attending: EMERGENCY MEDICINE | Admitting: FAMILY MEDICINE
Payer: COMMERCIAL

## 2025-04-09 ENCOUNTER — APPOINTMENT (OUTPATIENT)
Dept: GENERAL RADIOLOGY | Age: 50
DRG: 720 | End: 2025-04-09
Payer: COMMERCIAL

## 2025-04-09 ENCOUNTER — APPOINTMENT (OUTPATIENT)
Dept: CT IMAGING | Age: 50
DRG: 720 | End: 2025-04-09
Payer: COMMERCIAL

## 2025-04-09 DIAGNOSIS — J18.9 PNEUMONIA OF LEFT LUNG DUE TO INFECTIOUS ORGANISM, UNSPECIFIED PART OF LUNG: ICD-10-CM

## 2025-04-09 DIAGNOSIS — R65.10 SIRS (SYSTEMIC INFLAMMATORY RESPONSE SYNDROME) (HCC): Primary | ICD-10-CM

## 2025-04-09 DIAGNOSIS — K29.90 GASTRITIS AND DUODENITIS: ICD-10-CM

## 2025-04-09 LAB
ALBUMIN SERPL-MCNC: 3.5 G/DL (ref 3.5–5.2)
ALBUMIN/GLOB SERPL: 0.9 {RATIO} (ref 1–2.5)
ALP SERPL-CCNC: 138 U/L (ref 40–129)
ALT SERPL-CCNC: 50 U/L (ref 5–41)
ANION GAP SERPL CALCULATED.3IONS-SCNC: 14 MMOL/L (ref 9–17)
AST SERPL-CCNC: 101 U/L
BASOPHILS # BLD: 0 K/UL (ref 0–0.2)
BASOPHILS NFR BLD: 0 % (ref 0–2)
BILIRUB DIRECT SERPL-MCNC: 0.2 MG/DL
BILIRUB INDIRECT SERPL-MCNC: 0.3 MG/DL (ref 0–1)
BILIRUB SERPL-MCNC: 0.5 MG/DL (ref 0.3–1.2)
BUN SERPL-MCNC: 11 MG/DL (ref 6–20)
CALCIUM SERPL-MCNC: 8.8 MG/DL (ref 8.6–10.4)
CHLORIDE SERPL-SCNC: 91 MMOL/L (ref 98–107)
CO2 SERPL-SCNC: 22 MMOL/L (ref 20–31)
CREAT SERPL-MCNC: 0.8 MG/DL (ref 0.7–1.2)
EOSINOPHIL # BLD: 0 K/UL (ref 0–0.4)
EOSINOPHILS RELATIVE PERCENT: 0 % (ref 1–4)
ERYTHROCYTE [DISTWIDTH] IN BLOOD BY AUTOMATED COUNT: 14.3 % (ref 12.5–15.4)
GFR, ESTIMATED: >90 ML/MIN/1.73M2
GLUCOSE SERPL-MCNC: 97 MG/DL (ref 70–99)
HCT VFR BLD AUTO: 45.9 % (ref 41–53)
HGB BLD-MCNC: 15.6 G/DL (ref 13.5–17.5)
LACTATE BLDV-SCNC: 1.4 MMOL/L (ref 0.5–2.2)
LIPASE SERPL-CCNC: 13 U/L (ref 13–60)
LYMPHOCYTES NFR BLD: 0.5 K/UL (ref 1–4.8)
LYMPHOCYTES RELATIVE PERCENT: 3 % (ref 24–44)
MCH RBC QN AUTO: 29.3 PG (ref 26–34)
MCHC RBC AUTO-ENTMCNC: 34 G/DL (ref 31–37)
MCV RBC AUTO: 86.4 FL (ref 80–100)
MONOCYTES NFR BLD: 0.9 K/UL (ref 0.1–1.2)
MONOCYTES NFR BLD: 6 % (ref 2–11)
NEUTROPHILS NFR BLD: 91 % (ref 36–66)
NEUTS SEG NFR BLD: 14.2 K/UL (ref 1.8–7.7)
PLATELET # BLD AUTO: 221 K/UL (ref 140–450)
PMV BLD AUTO: 7.9 FL (ref 6–12)
POTASSIUM SERPL-SCNC: 4.1 MMOL/L (ref 3.7–5.3)
PROT SERPL-MCNC: 7.6 G/DL (ref 6.4–8.3)
RBC # BLD AUTO: 5.31 M/UL (ref 4.5–5.9)
SODIUM SERPL-SCNC: 127 MMOL/L (ref 135–144)
TROPONIN I SERPL HS-MCNC: 24 NG/L (ref 0–22)
TROPONIN I SERPL HS-MCNC: 25 NG/L (ref 0–22)
WBC OTHER # BLD: 15.7 K/UL (ref 3.5–11)

## 2025-04-09 PROCEDURE — 85025 COMPLETE CBC W/AUTO DIFF WBC: CPT

## 2025-04-09 PROCEDURE — 87804 INFLUENZA ASSAY W/OPTIC: CPT

## 2025-04-09 PROCEDURE — 6360000004 HC RX CONTRAST MEDICATION: Performed by: STUDENT IN AN ORGANIZED HEALTH CARE EDUCATION/TRAINING PROGRAM

## 2025-04-09 PROCEDURE — 96374 THER/PROPH/DIAG INJ IV PUSH: CPT

## 2025-04-09 PROCEDURE — 6370000000 HC RX 637 (ALT 250 FOR IP): Performed by: STUDENT IN AN ORGANIZED HEALTH CARE EDUCATION/TRAINING PROGRAM

## 2025-04-09 PROCEDURE — 81001 URINALYSIS AUTO W/SCOPE: CPT

## 2025-04-09 PROCEDURE — 93005 ELECTROCARDIOGRAM TRACING: CPT | Performed by: STUDENT IN AN ORGANIZED HEALTH CARE EDUCATION/TRAINING PROGRAM

## 2025-04-09 PROCEDURE — 87040 BLOOD CULTURE FOR BACTERIA: CPT

## 2025-04-09 PROCEDURE — 36415 COLL VENOUS BLD VENIPUNCTURE: CPT

## 2025-04-09 PROCEDURE — 80076 HEPATIC FUNCTION PANEL: CPT

## 2025-04-09 PROCEDURE — 0202U NFCT DS 22 TRGT SARS-COV-2: CPT

## 2025-04-09 PROCEDURE — 6360000002 HC RX W HCPCS: Performed by: EMERGENCY MEDICINE

## 2025-04-09 PROCEDURE — 83690 ASSAY OF LIPASE: CPT

## 2025-04-09 PROCEDURE — 96375 TX/PRO/DX INJ NEW DRUG ADDON: CPT

## 2025-04-09 PROCEDURE — 2500000003 HC RX 250 WO HCPCS: Performed by: STUDENT IN AN ORGANIZED HEALTH CARE EDUCATION/TRAINING PROGRAM

## 2025-04-09 PROCEDURE — 74177 CT ABD & PELVIS W/CONTRAST: CPT

## 2025-04-09 PROCEDURE — 80048 BASIC METABOLIC PNL TOTAL CA: CPT

## 2025-04-09 PROCEDURE — 71045 X-RAY EXAM CHEST 1 VIEW: CPT

## 2025-04-09 PROCEDURE — 87635 SARS-COV-2 COVID-19 AMP PRB: CPT

## 2025-04-09 PROCEDURE — 84484 ASSAY OF TROPONIN QUANT: CPT

## 2025-04-09 PROCEDURE — 83605 ASSAY OF LACTIC ACID: CPT

## 2025-04-09 PROCEDURE — 99285 EMERGENCY DEPT VISIT HI MDM: CPT

## 2025-04-09 RX ORDER — 0.9 % SODIUM CHLORIDE 0.9 %
80 INTRAVENOUS SOLUTION INTRAVENOUS ONCE
Status: DISCONTINUED | OUTPATIENT
Start: 2025-04-09 | End: 2025-04-11 | Stop reason: HOSPADM

## 2025-04-09 RX ORDER — ACETAMINOPHEN 325 MG/1
650 TABLET ORAL ONCE
Status: DISCONTINUED | OUTPATIENT
Start: 2025-04-09 | End: 2025-04-09

## 2025-04-09 RX ORDER — IOPAMIDOL 755 MG/ML
75 INJECTION, SOLUTION INTRAVASCULAR
Status: COMPLETED | OUTPATIENT
Start: 2025-04-09 | End: 2025-04-09

## 2025-04-09 RX ORDER — SODIUM CHLORIDE 0.9 % (FLUSH) 0.9 %
10 SYRINGE (ML) INJECTION ONCE
Status: COMPLETED | OUTPATIENT
Start: 2025-04-09 | End: 2025-04-09

## 2025-04-09 RX ORDER — PANTOPRAZOLE SODIUM 40 MG/10ML
40 INJECTION, POWDER, LYOPHILIZED, FOR SOLUTION INTRAVENOUS ONCE
Status: COMPLETED | OUTPATIENT
Start: 2025-04-09 | End: 2025-04-09

## 2025-04-09 RX ORDER — 0.9 % SODIUM CHLORIDE 0.9 %
500 INTRAVENOUS SOLUTION INTRAVENOUS ONCE
Status: COMPLETED | OUTPATIENT
Start: 2025-04-09 | End: 2025-04-10

## 2025-04-09 RX ORDER — ACETAMINOPHEN 500 MG
1000 TABLET ORAL ONCE
Status: COMPLETED | OUTPATIENT
Start: 2025-04-09 | End: 2025-04-09

## 2025-04-09 RX ORDER — LEVOFLOXACIN 5 MG/ML
750 INJECTION, SOLUTION INTRAVENOUS ONCE
Status: COMPLETED | OUTPATIENT
Start: 2025-04-09 | End: 2025-04-10

## 2025-04-09 RX ADMIN — PANTOPRAZOLE SODIUM 40 MG: 40 INJECTION, POWDER, FOR SOLUTION INTRAVENOUS at 23:28

## 2025-04-09 RX ADMIN — ACETAMINOPHEN 1000 MG: 500 TABLET ORAL at 21:20

## 2025-04-09 RX ADMIN — SODIUM CHLORIDE, PRESERVATIVE FREE 10 ML: 5 INJECTION INTRAVENOUS at 21:57

## 2025-04-09 RX ADMIN — IOPAMIDOL 75 ML: 755 INJECTION, SOLUTION INTRAVENOUS at 21:57

## 2025-04-09 RX ADMIN — Medication 80 ML: at 21:56

## 2025-04-09 ASSESSMENT — PAIN SCALES - GENERAL: PAINLEVEL_OUTOF10: 0

## 2025-04-09 ASSESSMENT — LIFESTYLE VARIABLES
HOW MANY STANDARD DRINKS CONTAINING ALCOHOL DO YOU HAVE ON A TYPICAL DAY: PATIENT DOES NOT DRINK
HOW OFTEN DO YOU HAVE A DRINK CONTAINING ALCOHOL: NEVER

## 2025-04-10 ENCOUNTER — APPOINTMENT (OUTPATIENT)
Dept: CT IMAGING | Age: 50
DRG: 720 | End: 2025-04-10
Payer: COMMERCIAL

## 2025-04-10 PROBLEM — J69.0 ASPIRATION PNEUMONIA (HCC): Status: ACTIVE | Noted: 2025-04-10

## 2025-04-10 PROBLEM — D17.9 ANGIOLIPOMA: Status: ACTIVE | Noted: 2025-04-10

## 2025-04-10 PROBLEM — K29.80 DUODENITIS: Status: ACTIVE | Noted: 2025-04-10

## 2025-04-10 PROBLEM — J18.9 MULTIFOCAL PNEUMONIA: Status: ACTIVE | Noted: 2025-04-10

## 2025-04-10 PROBLEM — K75.81 STEATOHEPATITIS: Status: ACTIVE | Noted: 2025-04-10

## 2025-04-10 PROBLEM — R74.01 TRANSAMINITIS: Status: ACTIVE | Noted: 2025-04-10

## 2025-04-10 PROBLEM — E87.1 HYPONATREMIA: Status: ACTIVE | Noted: 2025-04-10

## 2025-04-10 PROBLEM — K29.90 GASTRITIS AND DUODENITIS: Status: ACTIVE | Noted: 2025-04-10

## 2025-04-10 PROBLEM — A41.9 SEPSIS (HCC): Status: ACTIVE | Noted: 2025-04-10

## 2025-04-10 LAB
ANION GAP SERPL CALCULATED.3IONS-SCNC: 12 MMOL/L (ref 9–17)
B PARAP IS1001 DNA NPH QL NAA+NON-PROBE: NOT DETECTED
B PERT DNA SPEC QL NAA+PROBE: NOT DETECTED
BACTERIA URNS QL MICRO: ABNORMAL
BASOPHILS # BLD: 0.1 K/UL (ref 0–0.2)
BASOPHILS NFR BLD: 0 % (ref 0–2)
BILIRUB UR QL STRIP: NEGATIVE
BUN SERPL-MCNC: 11 MG/DL (ref 6–20)
C PNEUM DNA NPH QL NAA+NON-PROBE: NOT DETECTED
CALCIUM SERPL-MCNC: 8.6 MG/DL (ref 8.6–10.4)
CHARACTER UR: ABNORMAL
CHLORIDE SERPL-SCNC: 96 MMOL/L (ref 98–107)
CLARITY UR: CLEAR
CO2 SERPL-SCNC: 22 MMOL/L (ref 20–31)
COLOR UR: YELLOW
CREAT SERPL-MCNC: 0.9 MG/DL (ref 0.7–1.2)
EKG ATRIAL RATE: 123 BPM
EKG P AXIS: 67 DEGREES
EKG P-R INTERVAL: 146 MS
EKG Q-T INTERVAL: 332 MS
EKG QRS DURATION: 150 MS
EKG QTC CALCULATION (BAZETT): 475 MS
EKG R AXIS: 119 DEGREES
EKG T AXIS: -34 DEGREES
EKG VENTRICULAR RATE: 123 BPM
EOSINOPHIL # BLD: 0.1 K/UL (ref 0–0.4)
EOSINOPHILS RELATIVE PERCENT: 1 % (ref 1–4)
EPI CELLS #/AREA URNS HPF: ABNORMAL /HPF (ref 0–5)
ERYTHROCYTE [DISTWIDTH] IN BLOOD BY AUTOMATED COUNT: 14.2 % (ref 12.5–15.4)
FLUAV AG SPEC QL: NEGATIVE
FLUAV RNA NPH QL NAA+NON-PROBE: NOT DETECTED
FLUBV AG SPEC QL: NEGATIVE
FLUBV RNA NPH QL NAA+NON-PROBE: NOT DETECTED
GFR, ESTIMATED: >90 ML/MIN/1.73M2
GLUCOSE SERPL-MCNC: 103 MG/DL (ref 70–99)
GLUCOSE UR STRIP-MCNC: ABNORMAL MG/DL
HADV DNA NPH QL NAA+NON-PROBE: NOT DETECTED
HCOV 229E RNA NPH QL NAA+NON-PROBE: NOT DETECTED
HCOV HKU1 RNA NPH QL NAA+NON-PROBE: NOT DETECTED
HCOV NL63 RNA NPH QL NAA+NON-PROBE: NOT DETECTED
HCOV OC43 RNA NPH QL NAA+NON-PROBE: NOT DETECTED
HCT VFR BLD AUTO: 43 % (ref 41–53)
HGB BLD-MCNC: 14.5 G/DL (ref 13.5–17.5)
HGB UR QL STRIP.AUTO: ABNORMAL
HMPV RNA NPH QL NAA+NON-PROBE: NOT DETECTED
HPIV1 RNA NPH QL NAA+NON-PROBE: NOT DETECTED
HPIV2 RNA NPH QL NAA+NON-PROBE: NOT DETECTED
HPIV3 RNA NPH QL NAA+NON-PROBE: NOT DETECTED
HPIV4 RNA NPH QL NAA+NON-PROBE: NOT DETECTED
KETONES UR STRIP-MCNC: ABNORMAL MG/DL
LEUKOCYTE ESTERASE UR QL STRIP: NEGATIVE
LYMPHOCYTES NFR BLD: 0.6 K/UL (ref 1–4.8)
LYMPHOCYTES RELATIVE PERCENT: 4 % (ref 24–44)
M PNEUMO DNA NPH QL NAA+NON-PROBE: NOT DETECTED
MCH RBC QN AUTO: 29.1 PG (ref 26–34)
MCHC RBC AUTO-ENTMCNC: 33.7 G/DL (ref 31–37)
MCV RBC AUTO: 86.4 FL (ref 80–100)
MONOCYTES NFR BLD: 1 K/UL (ref 0.1–1.2)
MONOCYTES NFR BLD: 7 % (ref 2–11)
MUCOUS THREADS URNS QL MICRO: ABNORMAL
NEUTROPHILS NFR BLD: 88 % (ref 36–66)
NEUTS SEG NFR BLD: 12.7 K/UL (ref 1.8–7.7)
NITRITE UR QL STRIP: NEGATIVE
PH UR STRIP: 6 [PH] (ref 5–8)
PLATELET # BLD AUTO: 197 K/UL (ref 140–450)
PMV BLD AUTO: 8.3 FL (ref 6–12)
POTASSIUM SERPL-SCNC: 3.9 MMOL/L (ref 3.7–5.3)
PROT UR STRIP-MCNC: ABNORMAL MG/DL
RBC # BLD AUTO: 4.98 M/UL (ref 4.5–5.9)
RBC #/AREA URNS HPF: ABNORMAL /HPF (ref 0–2)
RSV RNA NPH QL NAA+NON-PROBE: NOT DETECTED
RV+EV RNA NPH QL NAA+NON-PROBE: NOT DETECTED
SARS-COV-2 RDRP RESP QL NAA+PROBE: NOT DETECTED
SARS-COV-2 RNA NPH QL NAA+NON-PROBE: NOT DETECTED
SODIUM SERPL-SCNC: 130 MMOL/L (ref 135–144)
SP GR UR STRIP: 1.02 (ref 1–1.03)
SPECIMEN DESCRIPTION: NORMAL
SPECIMEN DESCRIPTION: NORMAL
TROPONIN I SERPL HS-MCNC: 26 NG/L (ref 0–22)
UROBILINOGEN UR STRIP-ACNC: NORMAL EU/DL (ref 0–1)
WBC #/AREA URNS HPF: ABNORMAL /HPF (ref 0–5)
WBC OTHER # BLD: 14.4 K/UL (ref 3.5–11)

## 2025-04-10 PROCEDURE — G0378 HOSPITAL OBSERVATION PER HR: HCPCS

## 2025-04-10 PROCEDURE — 96366 THER/PROPH/DIAG IV INF ADDON: CPT

## 2025-04-10 PROCEDURE — 6370000000 HC RX 637 (ALT 250 FOR IP): Performed by: FAMILY MEDICINE

## 2025-04-10 PROCEDURE — 71250 CT THORAX DX C-: CPT

## 2025-04-10 PROCEDURE — 6360000002 HC RX W HCPCS: Performed by: EMERGENCY MEDICINE

## 2025-04-10 PROCEDURE — 6360000002 HC RX W HCPCS

## 2025-04-10 PROCEDURE — 96367 TX/PROPH/DG ADDL SEQ IV INF: CPT

## 2025-04-10 PROCEDURE — 2500000003 HC RX 250 WO HCPCS

## 2025-04-10 PROCEDURE — 96365 THER/PROPH/DIAG IV INF INIT: CPT

## 2025-04-10 PROCEDURE — 2060000000 HC ICU INTERMEDIATE R&B

## 2025-04-10 PROCEDURE — APPNB30 APP NON BILLABLE TIME 0-30 MINS: Performed by: NURSE PRACTITIONER

## 2025-04-10 PROCEDURE — 2580000003 HC RX 258: Performed by: EMERGENCY MEDICINE

## 2025-04-10 PROCEDURE — 36415 COLL VENOUS BLD VENIPUNCTURE: CPT

## 2025-04-10 PROCEDURE — 99223 1ST HOSP IP/OBS HIGH 75: CPT | Performed by: FAMILY MEDICINE

## 2025-04-10 PROCEDURE — 87506 IADNA-DNA/RNA PROBE TQ 6-11: CPT

## 2025-04-10 PROCEDURE — 6370000000 HC RX 637 (ALT 250 FOR IP)

## 2025-04-10 PROCEDURE — 2580000003 HC RX 258

## 2025-04-10 PROCEDURE — 93010 ELECTROCARDIOGRAM REPORT: CPT | Performed by: INTERNAL MEDICINE

## 2025-04-10 PROCEDURE — 80048 BASIC METABOLIC PNL TOTAL CA: CPT

## 2025-04-10 PROCEDURE — 85025 COMPLETE CBC W/AUTO DIFF WBC: CPT

## 2025-04-10 PROCEDURE — 87641 MR-STAPH DNA AMP PROBE: CPT

## 2025-04-10 PROCEDURE — APPSS45 APP SPLIT SHARED TIME 31-45 MINUTES

## 2025-04-10 RX ORDER — POLYETHYLENE GLYCOL 3350 17 G/17G
17 POWDER, FOR SOLUTION ORAL DAILY PRN
Status: DISCONTINUED | OUTPATIENT
Start: 2025-04-10 | End: 2025-04-11 | Stop reason: HOSPADM

## 2025-04-10 RX ORDER — ENOXAPARIN SODIUM 100 MG/ML
30 INJECTION SUBCUTANEOUS 2 TIMES DAILY
Status: DISCONTINUED | OUTPATIENT
Start: 2025-04-10 | End: 2025-04-11 | Stop reason: HOSPADM

## 2025-04-10 RX ORDER — SODIUM CHLORIDE 0.9 % (FLUSH) 0.9 %
5-40 SYRINGE (ML) INJECTION EVERY 12 HOURS SCHEDULED
Status: DISCONTINUED | OUTPATIENT
Start: 2025-04-10 | End: 2025-04-11 | Stop reason: HOSPADM

## 2025-04-10 RX ORDER — ASPIRIN 81 MG/1
81 TABLET ORAL DAILY
Status: DISCONTINUED | OUTPATIENT
Start: 2025-04-10 | End: 2025-04-11 | Stop reason: HOSPADM

## 2025-04-10 RX ORDER — DIPHENHYDRAMINE HYDROCHLORIDE 50 MG/ML
25 INJECTION, SOLUTION INTRAMUSCULAR; INTRAVENOUS ONCE
Status: DISCONTINUED | OUTPATIENT
Start: 2025-04-10 | End: 2025-04-11 | Stop reason: HOSPADM

## 2025-04-10 RX ORDER — ATORVASTATIN CALCIUM 20 MG/1
20 TABLET, FILM COATED ORAL NIGHTLY
Status: DISCONTINUED | OUTPATIENT
Start: 2025-04-10 | End: 2025-04-11 | Stop reason: HOSPADM

## 2025-04-10 RX ORDER — ONDANSETRON 4 MG/1
4 TABLET, ORALLY DISINTEGRATING ORAL EVERY 8 HOURS PRN
Status: DISCONTINUED | OUTPATIENT
Start: 2025-04-10 | End: 2025-04-11 | Stop reason: HOSPADM

## 2025-04-10 RX ORDER — ACETAMINOPHEN 650 MG/1
650 SUPPOSITORY RECTAL EVERY 6 HOURS PRN
Status: DISCONTINUED | OUTPATIENT
Start: 2025-04-10 | End: 2025-04-11 | Stop reason: HOSPADM

## 2025-04-10 RX ORDER — ACETAMINOPHEN 325 MG/1
650 TABLET ORAL EVERY 6 HOURS PRN
Status: DISCONTINUED | OUTPATIENT
Start: 2025-04-10 | End: 2025-04-11 | Stop reason: HOSPADM

## 2025-04-10 RX ORDER — LISINOPRIL 2.5 MG/1
2.5 TABLET ORAL DAILY
Status: DISCONTINUED | OUTPATIENT
Start: 2025-04-10 | End: 2025-04-11 | Stop reason: HOSPADM

## 2025-04-10 RX ORDER — CLOPIDOGREL BISULFATE 75 MG/1
75 TABLET ORAL DAILY
Status: DISCONTINUED | OUTPATIENT
Start: 2025-04-10 | End: 2025-04-11 | Stop reason: HOSPADM

## 2025-04-10 RX ORDER — SODIUM CHLORIDE 0.9 % (FLUSH) 0.9 %
10 SYRINGE (ML) INJECTION PRN
Status: DISCONTINUED | OUTPATIENT
Start: 2025-04-10 | End: 2025-04-11 | Stop reason: HOSPADM

## 2025-04-10 RX ORDER — PANTOPRAZOLE SODIUM 40 MG/1
40 TABLET, DELAYED RELEASE ORAL
Status: DISCONTINUED | OUTPATIENT
Start: 2025-04-10 | End: 2025-04-11 | Stop reason: HOSPADM

## 2025-04-10 RX ORDER — SODIUM CHLORIDE 9 MG/ML
INJECTION, SOLUTION INTRAVENOUS PRN
Status: DISCONTINUED | OUTPATIENT
Start: 2025-04-10 | End: 2025-04-11 | Stop reason: HOSPADM

## 2025-04-10 RX ORDER — POTASSIUM CHLORIDE 7.45 MG/ML
10 INJECTION INTRAVENOUS PRN
Status: DISCONTINUED | OUTPATIENT
Start: 2025-04-10 | End: 2025-04-11 | Stop reason: HOSPADM

## 2025-04-10 RX ORDER — FUROSEMIDE 20 MG/1
40 TABLET ORAL DAILY
Status: DISCONTINUED | OUTPATIENT
Start: 2025-04-10 | End: 2025-04-11 | Stop reason: HOSPADM

## 2025-04-10 RX ORDER — LEVOFLOXACIN 5 MG/ML
750 INJECTION, SOLUTION INTRAVENOUS EVERY 24 HOURS
Status: DISCONTINUED | OUTPATIENT
Start: 2025-04-11 | End: 2025-04-11 | Stop reason: HOSPADM

## 2025-04-10 RX ORDER — METOPROLOL TARTRATE 25 MG/1
12.5 TABLET, FILM COATED ORAL 2 TIMES DAILY
Status: DISCONTINUED | OUTPATIENT
Start: 2025-04-10 | End: 2025-04-11 | Stop reason: HOSPADM

## 2025-04-10 RX ORDER — POTASSIUM CHLORIDE 1500 MG/1
40 TABLET, EXTENDED RELEASE ORAL PRN
Status: DISCONTINUED | OUTPATIENT
Start: 2025-04-10 | End: 2025-04-11 | Stop reason: HOSPADM

## 2025-04-10 RX ORDER — ALBUTEROL SULFATE 0.83 MG/ML
2.5 SOLUTION RESPIRATORY (INHALATION) EVERY 4 HOURS PRN
Status: DISCONTINUED | OUTPATIENT
Start: 2025-04-10 | End: 2025-04-11 | Stop reason: HOSPADM

## 2025-04-10 RX ORDER — MAGNESIUM SULFATE 1 G/100ML
1000 INJECTION INTRAVENOUS PRN
Status: DISCONTINUED | OUTPATIENT
Start: 2025-04-10 | End: 2025-04-11 | Stop reason: HOSPADM

## 2025-04-10 RX ORDER — METRONIDAZOLE 500 MG/100ML
500 INJECTION, SOLUTION INTRAVENOUS EVERY 8 HOURS
Status: DISCONTINUED | OUTPATIENT
Start: 2025-04-10 | End: 2025-04-11 | Stop reason: HOSPADM

## 2025-04-10 RX ORDER — ONDANSETRON 2 MG/ML
4 INJECTION INTRAMUSCULAR; INTRAVENOUS EVERY 6 HOURS PRN
Status: DISCONTINUED | OUTPATIENT
Start: 2025-04-10 | End: 2025-04-11 | Stop reason: HOSPADM

## 2025-04-10 RX ADMIN — EMPAGLIFLOZIN 10 MG: 10 TABLET, FILM COATED ORAL at 08:20

## 2025-04-10 RX ADMIN — ATORVASTATIN CALCIUM 20 MG: 20 TABLET, FILM COATED ORAL at 21:12

## 2025-04-10 RX ADMIN — Medication 3 MG: at 23:59

## 2025-04-10 RX ADMIN — ASPIRIN 81 MG: 81 TABLET, COATED ORAL at 08:20

## 2025-04-10 RX ADMIN — ACETAMINOPHEN 650 MG: 325 TABLET ORAL at 03:39

## 2025-04-10 RX ADMIN — METRONIDAZOLE 500 MG: 500 INJECTION, SOLUTION INTRAVENOUS at 16:00

## 2025-04-10 RX ADMIN — SODIUM CHLORIDE, PRESERVATIVE FREE 10 ML: 5 INJECTION INTRAVENOUS at 21:12

## 2025-04-10 RX ADMIN — METRONIDAZOLE 500 MG: 500 INJECTION, SOLUTION INTRAVENOUS at 08:26

## 2025-04-10 RX ADMIN — SODIUM CHLORIDE: 0.9 INJECTION, SOLUTION INTRAVENOUS at 08:25

## 2025-04-10 RX ADMIN — LEVOFLOXACIN 750 MG: 750 INJECTION, SOLUTION INTRAVENOUS at 00:24

## 2025-04-10 RX ADMIN — METRONIDAZOLE 500 MG: 500 INJECTION, SOLUTION INTRAVENOUS at 03:45

## 2025-04-10 RX ADMIN — PANTOPRAZOLE SODIUM 40 MG: 40 TABLET, DELAYED RELEASE ORAL at 15:58

## 2025-04-10 RX ADMIN — PANTOPRAZOLE SODIUM 40 MG: 40 TABLET, DELAYED RELEASE ORAL at 08:20

## 2025-04-10 RX ADMIN — SODIUM CHLORIDE 500 ML: 0.9 INJECTION, SOLUTION INTRAVENOUS at 00:22

## 2025-04-10 RX ADMIN — SODIUM CHLORIDE, PRESERVATIVE FREE 10 ML: 5 INJECTION INTRAVENOUS at 08:20

## 2025-04-10 RX ADMIN — METOPROLOL TARTRATE 12.5 MG: 25 TABLET, FILM COATED ORAL at 21:12

## 2025-04-10 ASSESSMENT — ENCOUNTER SYMPTOMS
WHEEZING: 0
ALLERGIC/IMMUNOLOGIC NEGATIVE: 1
SHORTNESS OF BREATH: 0
COUGH: 1
ABDOMINAL PAIN: 0
CONSTIPATION: 0
VOMITING: 1
NAUSEA: 1
DIARRHEA: 1
EYES NEGATIVE: 1
SORE THROAT: 0

## 2025-04-10 ASSESSMENT — PAIN SCALES - GENERAL
PAINLEVEL_OUTOF10: 0
PAINLEVEL_OUTOF10: 0

## 2025-04-10 NOTE — CARE COORDINATION
Case Management Assessment  Initial Evaluation    Date/Time of Evaluation: 4/10/2025 3:21 PM  Assessment Completed by: Maricruz Vera    If patient is discharged prior to next notation, then this note serves as note for discharge by case management.    Patient Name: Jesús Min                   YOB: 1975  Diagnosis: Gastritis and duodenitis [K29.90]  SIRS (systemic inflammatory response syndrome) (HCC) [R65.10]  Pneumonia of left lung due to infectious organism, unspecified part of lung [J18.9]  Multifocal pneumonia [J18.9]                   Date / Time: 4/9/2025  8:11 PM    Patient Admission Status: Inpatient   Readmission Risk (Low < 19, Mod (19-27), High > 27): Readmission Risk Score: 11.9    Current PCP: Opal Foley, DO  PCP verified by CM? (P) Yes    Chart Reviewed: Yes      History Provided by: (P) Patient  Patient Orientation: (P) Alert and Oriented    Patient Cognition: (P) Alert    Hospitalization in the last 30 days (Readmission):  No    If yes, Readmission Assessment in  Navigator will be completed.    Advance Directives:      Code Status: Full Code   Patient's Primary Decision Maker is: (P) Legal Next of Kin      Discharge Planning:    Patient lives with: (P) Spouse/Significant Other, Children Type of Home: (P) Trailer/Mobile Home  Primary Care Giver: (P) Self  Patient Support Systems include: (P) Spouse/Significant Other, Children   Current Financial resources: (P) Medicaid  Current community resources: (P) None  Current services prior to admission: (P) None            Current DME:              Type of Home Care services:  (P) None    ADLS  Prior functional level: (P) Independent in ADLs/IADLs  Current functional level: (P) Independent in ADLs/IADLs    PT AM-PAC:   /24  OT AM-PAC:   /24    Family can provide assistance at DC: (P) Yes  Would you like Case Management to discuss the discharge plan with any other family members/significant others, and if so, who? (P) No  Plans to  Return to Present Housing:    Other Identified Issues/Barriers to RETURNING to current housing: n/a  Potential Assistance needed at discharge: (P) N/A            Potential DME:    Patient expects to discharge to: (P) Trailer/mobile home  Plan for transportation at discharge: (P) Family    Financial    Payor: [employer] PLAN / Plan: [employer] PLAN / Product Type: *No Product type* /     Does insurance require precert for SNF: Yes    Potential assistance Purchasing Medications: (P) No  Meds-to-Beds request:        [facility] [id number] - [place], [place] - [address] [phone] - F [phone]  [address] [place] [address]  Phone: [phone] Fax: [phone]      Notes:    Factors facilitating achievement of predicted outcomes: Family support, Cooperative, and Pleasant    Barriers to discharge: Limited insight into deficits and Medical complications    Additional Case Management Notes: Patient is from home independently with wife and children. Plans to return home at discharge with family to transport. Denies needs from .    The Plan for Transition of Care is related to the following treatment goals of Gastritis and duodenitis [K29.90]  SIRS (systemic inflammatory response syndrome) (HCC) [R65.10]  Pneumonia of left lung due to infectious organism, unspecified part of lung [J18.9]  Multifocal pneumonia [J18.9]    IF APPLICABLE: The Patient and/or patient representative Jesús and his family were provided with a choice of provider and agrees with the discharge plan. Freedom of choice list with basic dialogue that supports the patient's individualized plan of care/goals and shares the quality data associated with the providers was provided to:     Patient Representative Name:       The Patient and/or Patient Representative Agree with the Discharge Plan?      Maricruz Vera  Case Management Department

## 2025-04-10 NOTE — H&P
[facility]  Office: [phone]  Francis Wynne DO, Barry Devries DO, Wei Downing DO, Khris Scott DO, David Evans MD, Kenia Gillis MD, Cecil Hampton MD, Jessy Franco MD,  Leroy Hill MD, Florecita Moran MD, Dylan Yeager MD,  Rio Collazo DO, Taisha Andrew MD, Donn Parks MD, Paul Wynne DO, Olimpia Hernandez MD,  Zia Dowd DO, Any Miller MD, Sherly Angeles MD, Cl Alexander MD,  Jorge Luis Bahena MD, Robin Wilson MD, Bubba Candelario MD, Dahlia Stapleton MD, Elder Rios MD, Dinesh Ledezma MD, Ricardo Foley DO, Edu Vazquez MD, Rio Schmitt MD, Mohsin Reza, MD, Johnna Perrin MD, Shirley Waterhouse, CNP,  Chelle Perdomo, CNP, Ricardo Webb, CNP,  Ellen Jacinto, DNP, Bess Tao, CNP, Saira Taveras, CNP, Francine June, CNP, Kati Toussaint, CNP, Sidra García, PA-C, Ronel Hudson, CNP, Prakash Gardiner, CNP,  Monica Dawson, CNP, Katiuska Scherer, CNP, Jeramie Kelsey, PA-C, Mayela Sweeney, CNP,  Shauna Gibbons, CNS, Darlyn Izaguirre, CNP, Jacquelin Bucio, CNP,   Brit Hope, CNP         [facility]   IN-PATIENT SERVICE   [facility]    HISTORY AND PHYSICAL EXAMINATION            Date:   4/10/2025  Patient name:  Jesús Min  Date of admission:  4/9/2025  8:11 PM  MRN:   [id number]  Account:  [id number]  YOB: 1975  PCP:    Opal Foley DO  Room:   ER10/ER10  Code Status:    Full Code    Chief Complaint:     Chief Complaint   Patient presents with    Vomiting     Pt c/o N/V that started on Monday. Pt states he made some pulled pork on Sunday and thinks it was bad. Pt states he has not been feeling well since then. Pt states he has not been able to keep anything down. Pt c/o dry heaves and left sided pain from all the dry heaves. Pt also c/o chills.     Nausea       History Obtained From:     patient, electronic medical record    History of Present Illness:     Jesús DWIGHT Min is a 49 y.o. Non- / non

## 2025-04-10 NOTE — CONSULTS
[facility]    GASTROENTEROLOGY CONSULT    Patient:   Jesús Min   :    1975   Facility:   [facility]  Date:    4/10/2025  Admission Dx:  Multifocal pneumonia [J18.9]  Requesting physician: Jessy Franco MD  Reason for consult:  duodenitis      SUBJECTIVE:  History of Present Illness:  This is a 49 y.o.   male who was admitted 2025 with Multifocal pneumonia [J18.9]. We have been asked to see the patient in consultation by Jessy Franco MD for  duodenitis. This is a 49 year old male with pmh of chf mi on aspirin and plavix who presented to the ED for nausea and vomiting. He is being treated for pneumonia. C/o nausea with one episode of vomiting food since .  No abdominal pain diarrhea or fever. Took vitamin c at home to help the symptoms. Underwent ct abd imaging that shows steatosis with wall thickening of the antrum/pylorus concerning for duodenitis. He states in the last 12 hours he has been able to eat and has not had more emesis, he currently denies nausea. Lft's are elevated, he denies etoh use and denies prior liver disease.     Endoscopy none      OBJECTIVE:    PAST MEDICAL/SURGICAL HISTORY  Past Medical History:   Diagnosis Date    CHF (congestive heart failure) (HCC)     Heart attack (HCC)      Past Surgical History:   Procedure Laterality Date    CARDIAC CATHETERIZATION      with stent placement    CARDIAC PROCEDURE N/A 2024    Left heart cath / coronary angiography performed by Makenzie Benavidez MD at [facility] Cardiac Cath/IR    CORONARY ARTERY BYPASS GRAFT N/A 2024    CORONARY ARTERY BYPASS GRAFT X3, POSSIBLE RADIAL HARVEST, ON PUMP, SWAN, NORTH performed by Ricardo James MD at [facility] CVOR       ALLERGIES:  Allergies   Allergen Reactions    Codeine Nausea Only    Pcn [Penicillins]        HOME MEDICATIONS:  Prior to Admission medications    Medication Sig Start Date End Date Taking? Authorizing Provider   potassium  areas of ground-glass and consolidative changes  extending into the superior segment of the left lower lobe.  The central  airways are otherwise patent.  No pneumothorax.  The right lung is grossly  clear.     Upper Abdomen: No acute findings in the upper abdomen.     Soft Tissues/Bones: Status post median sternotomy.  No acute findings in the  bones or soft tissues.     IMPRESSION:  Left upper and lower lobe pneumonia.  Recommend radiographic follow-up to  ensure complete resolution.     Mediastinal adenopathy likely reactive.    IMPRESSION/plan  1. Nausea and vomiting, improved imaging showing possible duodenitis  Antiemetics prn  Ppi bid  Has been started on flagyl  Egd tomorrow    2.pneumonia  Mgt per primary service    3.elevated lft imaging showing steatosis, denies etoh use  Liver w/u ordered  trend    This plan was formulated in collaboration with Dr. chávez .  Thank you for allowing us to participate in the care of your patient.    Electronically signed by: JOSE Crews CNP on 4/10/2025 at 10:54 AM

## 2025-04-10 NOTE — ED PROVIDER NOTES
[facility] Emergency Department  EMERGENCY DEPARTMENT ENCOUNTER      Pt Name: Jesús Min  MRN: [id number]  Birthdate 1975  Date of evaluation: 4/9/2025    CHIEF COMPLAINT       Chief Complaint   Patient presents with    Vomiting     Pt c/o N/V that started on Monday. Pt states he made some pulled pork on Sunday and thinks it was bad. Pt states he has not been feeling well since then. Pt states he has not been able to keep anything down. Pt c/o dry heaves and left sided pain from all the dry heaves. Pt also c/o chills.     Nausea       HISTORY OF PRESENT ILLNESS    Jesús Min is a 49 y.o. male who presents to the emergency department complaining of nausea vomiting and fever that started after eating undercooked pork 2 days ago.  He says he has been vomiting since unable to keep anything down.  No abdominal pain.  No fevers or chills that he is aware of but does present with significantly tachycardic heart rate in the 130s and with a temperature of 102.7.  No one else has been sick around him that he is aware of.  Denies any other relevant symptoms.  No diarrhea or constipation.  No blood in his stool.  Significant cardiac history including congestive heart failure and bypass surgery last year.    REVIEW OF SYSTEMS       Constitutional: Positive fevers no chills  HENT: No sore throat, rhinorrhea, or earache   Eyes: No blurry vision or double vision no drainage   Cardiovascular: No chest pain or tachycardia   Respiratory: No wheezing or shortness of breath no cough   Gastrointestinal: Positive nausea, vomiting, no diarrhea, constipation, or abdominal pain   : No hematuria or dysuria   Musculoskeletal: No swelling or pain   Skin: No rash   Neurological: No focal neurologic complaints, paresthesias, weakness, or headache     PAST MEDICAL HISTORY    has a past medical history of CHF (congestive heart failure) (HCC) and Heart attack (HCC).    SURGICAL HISTORY      has a past surgical history that

## 2025-04-10 NOTE — ED NOTES
Placed pt on 2 L of O2 via NC d/t SPO2 sat of 88% with a good pleth on RA- pt RR are in the 30s pt denies any SOB-

## 2025-04-10 NOTE — PROGRESS NOTES
Physical Therapy        Physical Therapy Cancel Note      DATE: 4/10/2025    NAME: Jesús Min  MRN: [id number]   : 1975      Patient not seen this date for Physical Therapy due to:    Patient independent with functional mobility. Will defer PT evaluation at this time. Please reorder PT if future needs arise.       Electronically signed by Maria Larry PT on 4/10/2025 at 2:38 PM

## 2025-04-10 NOTE — RT PROTOCOL NOTE
RT Nebulizer Bronchodilator Protocol Note    There is a bronchodilator order in the chart from a provider indicating to follow the RT Bronchodilator Protocol and there is an “Initiate RT Bronchodilator Protocol” order as well (see protocol at bottom of note).    CXR Findings:  XR CHEST PORTABLE  Result Date: 4/9/2025  Left perihilar consolidation       The findings from the last RT Protocol Assessment were as follows:  Smoking: Smoker 15 pack years or more  Respiratory Pattern: Regular pattern and RR 12-20 bpm  Breath Sounds: Slightly diminished and/or crackles  Cough: Strong, spontaneous, non-productive  Indication for Bronchodilator Therapy:    Bronchodilator Assessment Score: 3    Aerosolized bronchodilator medication orders have been revised according to the RT Nebulizer Bronchodilator Protocol below.    Respiratory Therapist to perform RT Therapy Protocol Assessment initially then follow the protocol.  Repeat RT Therapy Protocol Assessment PRN for score 0-3 or on second treatment, BID, and PRN for scores above 3.    No Indications - adjust the frequency to every 6 hours PRN wheezing or bronchospasm, if no treatments needed after 48 hours then discontinue using Per Protocol order mode.     If indication present, adjust the RT bronchodilator orders based on the Bronchodilator Assessment Score as indicated below.  If a patient is on this medication at home then do not decrease Frequency below that used at home.    0-3 - enter or revise RT bronchodilator order(s) to equivalent RT Bronchodilator order with Frequency of every 4 hours PRN for wheezing or increased work of breathing using Per Protocol order mode.       4-6 - enter or revise RT Bronchodilator order(s) to two equivalent RT bronchodilator orders with one order with BID Frequency and one order with Frequency of every 4 hours PRN wheezing or increased work of breathing using Per Protocol order mode.         7-10 - enter or revise RT Bronchodilator order(s)

## 2025-04-10 NOTE — PROGRESS NOTES
Occupational Therapy    [facility]  Occupational Therapy Not Seen Note    DATE: 4/10/2025    NAME: Jesús Min  MRN: [id number]   : 1975      Patient not seen this date for Occupational Therapy due to:    Patient educated about role and purpose of OT. Patient independent with ADLs and functional tasks with no acute OT needs. Pt declined need for OT services at this time. Will defer OT evaluation at this time. Please reorder OT if future needs arise.         Electronically signed by Jamil Webb on 4/10/2025 at 2:41 PM

## 2025-04-10 NOTE — ED NOTES
ED to inpatient nurses report      Chief Complaint:  Chief Complaint   Patient presents with    Vomiting     Pt c/o N/V that started on Monday. Pt states he made some pulled pork on Sunday and thinks it was bad. Pt states he has not been feeling well since then. Pt states he has not been able to keep anything down. Pt c/o dry heaves and left sided pain from all the dry heaves. Pt also c/o chills.     Nausea     Present to ED from: as above; NV since Monday w chills and fever; 102.7 on arrival to ER    MOA:     LOC: alert and orientated to name, place, date  Mobility: Independent  Oxygen Baseline: 2 L NC as needed    Current needs required: as needed 2L NC currently wearing while in bed   Pending ED orders: stool sample still pending unable to have stool; day's labs CBC still needs collect-new order just put in  Present condition: stable    Why did the patient come to the ED? As above:  Dx Pneumonia (concern for multifocal pneumonia); gastritis/duonenities and SIRS  What is the plan? GI consult - ATB therapy and fever monitoring  Any procedures or intervention occur? Labs:  WBC: 157, , Flu/COVID neg; CXR: Lt lung pneumonia, ATB IVPB started Levaquin and Flagyl  temp meds: Tylenol 1GM last dose 2120 4/9 afebrile since  Any safety concerns??none at this time; ambulates in room  CODE STATUS Full Code  Diet ADULT DIET; Regular; 4 carb choices (60 gm/meal)    Mental Status:  Level of Consciousness: Alert (0)    Psych Assessment:   Psychosocial  Psychosocial (WDL): Within Defined Limits  Vital signs   Vitals:    04/10/25 0330 04/10/25 0442 04/10/25 0532 04/10/25 0800   BP:    118/77   Pulse:    (!) 102   Resp:    23   Temp: (!) 100.6 °F (38.1 °C) (!) 100.6 °F (38.1 °C) 99.1 °F (37.3 °C) 98.9 °F (37.2 °C)   TempSrc: Oral Oral Oral Oral   SpO2:    92%   Weight:       Height:            Vitals:  Patient Vitals for the past 24 hrs:   BP Temp Temp src Pulse Resp SpO2 Height Weight   04/10/25 0800 118/77 98.9 °F (37.2 °C)  department  because of falls (Syncope, seizure, or loss of consciousness): No, Age > 70: No, Altered Mental Status, Intoxication with alcohol or substance confusion (Disorientation, impaired judgment, poor safety awaremess, or inability to follow instructions): No, Impaired Mobility: Ambulates or transfers with assistive devices or assistance; Unable to ambulate or transer.: No, Nursing Judgement: No    Diagnosis:  DISPOSITION Admitted 04/10/2025 12:07:40 AM   Final diagnoses:   SIRS (systemic inflammatory response syndrome) (HCC)   Pneumonia of left lung due to infectious organism, unspecified part of lung   Gastritis and duodenitis            Assessment Abnormalities : (List)  Neuro: Confused   Yes[] No[x]    Respiratory : Labored  Yes[] No[x]  Lung Sounds diminished bilat    Cardiac:   Regular  Yes[x] No[]  Irregular Yes[] No[x]    RhythmNSR    :  Incontinent  Yes[] No[x]     Assessment Abnormalities lungs diminished; required oxygen this am sl tachycardia in 100's      Skin Assessment: pink warm dry       Pain Score:  Pain Assessment  Pain Assessment: None - Denies Pain  Pain Level: 0    ISOLATION Status  No active isolations    Labs:  Labs Reviewed   TROPONIN - Abnormal; Notable for the following components:       Result Value    Troponin, High Sensitivity 25 (*)     All other components within normal limits   BASIC METABOLIC PANEL - Abnormal; Notable for the following components:    Sodium 127 (*)     Chloride 91 (*)     All other components within normal limits   HEPATIC FUNCTION PANEL - Abnormal; Notable for the following components:    Alkaline Phosphatase 138 (*)     ALT 50 (*)      (*)     Albumin/Globulin Ratio 0.9 (*)     All other components within normal limits   CBC WITH AUTO DIFFERENTIAL - Abnormal; Notable for the following components:    WBC 15.7 (*)     Neutrophils % 91 (*)     Lymphocytes % 3 (*)     Eosinophils % 0 (*)     Neutrophils Absolute 14.20 (*)     Lymphocytes Absolute 0.50

## 2025-04-10 NOTE — ED PROVIDER NOTES
[facility] Emergency Department  [address]  [place] [address]  Phone: [phone]  Fax: [phone]    EMERGENCY DEPARTMENT ENCOUNTER          Pt Name: Jesús Min  MRN: [id number]  Birthdate 1975  Date of evaluation: 4/9/2025      CHIEF COMPLAINT       Chief Complaint   Patient presents with    Vomiting     Pt c/o N/V that started on Monday. Pt states he made some pulled pork on Sunday and thinks it was bad. Pt states he has not been feeling well since then. Pt states he has not been able to keep anything down. Pt c/o dry heaves and left sided pain from all the dry heaves. Pt also c/o chills.     Nausea       HISTORY OF PRESENT ILLNESS       Jesús Min is a 49 y.o. male past medical history of previous MI, CHF, previous CABG x 3 presenting with nausea vomiting ongoing for the past 3 days.  Patient states that he was attempting to make some pork on Sunday and thinks he did not took it all the way through.  Since ingesting back, he has been having difficulty tolerating p.o. and is dry heaving and vomiting up phlegm.  Reports that he has felt chills at home but did not measure any objective temperature/fever.  Denies any explicit chest pain, lightheadedness, dizziness.  Does endorse abdominal pain, nausea, vomiting, chills.    REVIEW OF SYSTEMS       Negative except for as stated above in HPI.    PAST MEDICAL HISTORY    has a past medical history of CHF (congestive heart failure) (HCC) and Heart attack (HCC).    SURGICAL HISTORY      has a past surgical history that includes Cardiac catheterization; Cardiac procedure (N/A, 6/17/2024); and Coronary artery bypass graft (N/A, 6/24/2024).    CURRENT MEDICATIONS       Previous Medications    ACETAMINOPHEN (TYLENOL) 500 MG TABLET    Take 2 tablets by mouth 3 times daily as needed for Pain    ALBUTEROL SULFATE HFA (VENTOLIN HFA) 108 (90 BASE) MCG/ACT INHALER    Inhale 2 puffs into the lungs 4 times daily as needed for Wheezing     Additional Contrast? None    (Results Pending)     No results found.    LABS:  Results for orders placed or performed during the hospital encounter of 04/09/25   EKG 12 Lead   Result Value Ref Range    Ventricular Rate 123 BPM    Atrial Rate 123 BPM    P-R Interval 146 ms    QRS Duration 150 ms    Q-T Interval 332 ms    QTc Calculation (Bazett) 475 ms    P Axis 67 degrees    R Axis 119 degrees    T Axis -34 degrees       EMERGENCY DEPARTMENT COURSE:     The patient was given the following medications:  Orders Placed This Encounter   Medications    DISCONTD: acetaminophen (TYLENOL) tablet 650 mg    acetaminophen (TYLENOL) tablet 1,000 mg        Vitals:    Vitals:    04/09/25 2001 04/09/25 2026   BP: 137/76    Pulse: (!) 129    Resp: 26    Temp:  (!) 102.7 °F (39.3 °C)   TempSrc:  Oral   SpO2: 94%    Weight: 120.2 kg (265 lb)    Height: 1.676 m (5' 6\")      -------------------------  BP: 137/76, Temp: (!) 102.7 °F (39.3 °C), Pulse: (!) 129, Respirations: 26    CONSULTS:    None    CRITICAL CARE:     None    PROCEDURES:    None    FINAL IMPRESSION      1. SIRS (systemic inflammatory response syndrome) (HCC)        DISPOSITION/PLAN   DISPOSITION         PENDING    Condition on Disposition    PENDING    (Please note that portions of this note were completed with a voice recognition program.  Efforts were made to edit the dictations but occasionally words are mis-transcribed.)    Ricardo Sanabria MD  Attending Emergency Physician    Attending to complete all remaining care, diagnosis and disposition for this patient.  We discussed this patient prior to my departure.  My note will be refreshed to reflect these details, though I was not actively involved in this patient's care following the time stamp above.

## 2025-04-11 ENCOUNTER — TELEPHONE (OUTPATIENT)
Dept: PRIMARY CARE CLINIC | Age: 50
End: 2025-04-11

## 2025-04-11 VITALS
HEIGHT: 66 IN | SYSTOLIC BLOOD PRESSURE: 113 MMHG | OXYGEN SATURATION: 94 % | TEMPERATURE: 98.2 F | WEIGHT: 244.93 LBS | DIASTOLIC BLOOD PRESSURE: 67 MMHG | HEART RATE: 80 BPM | BODY MASS INDEX: 39.36 KG/M2 | RESPIRATION RATE: 24 BRPM

## 2025-04-11 LAB
A1AT SERPL-MCNC: 346 MG/DL (ref 90–200)
AFP SERPL-MCNC: <1.8 UG/L
ALBUMIN SERPL-MCNC: 3.1 G/DL (ref 3.5–5.2)
ALBUMIN/GLOB SERPL: 0.8 {RATIO} (ref 1–2.5)
ALP SERPL-CCNC: 166 U/L (ref 40–129)
ALT SERPL-CCNC: 65 U/L (ref 5–41)
ANION GAP SERPL CALCULATED.3IONS-SCNC: 12 MMOL/L (ref 9–17)
AST SERPL-CCNC: 87 U/L
BASOPHILS # BLD: 0 K/UL (ref 0–0.2)
BASOPHILS NFR BLD: 0 % (ref 0–2)
BILIRUB SERPL-MCNC: 0.3 MG/DL (ref 0.3–1.2)
BUN SERPL-MCNC: 14 MG/DL (ref 6–20)
CALCIUM SERPL-MCNC: 8.7 MG/DL (ref 8.6–10.4)
CAMPYLOBACTER DNA SPEC NAA+PROBE: NORMAL
CERULOPLASMIN SERPL-MCNC: 33 MG/DL (ref 15–30)
CHLORIDE SERPL-SCNC: 99 MMOL/L (ref 98–107)
CMV IGG SERPL QL IA: <0.2
CMV IGM SERPL QL IA: 0.2
CO2 SERPL-SCNC: 23 MMOL/L (ref 20–31)
CREAT SERPL-MCNC: 0.8 MG/DL (ref 0.7–1.2)
EOSINOPHIL # BLD: 0.2 K/UL (ref 0–0.4)
EOSINOPHILS RELATIVE PERCENT: 2 % (ref 1–4)
ERYTHROCYTE [DISTWIDTH] IN BLOOD BY AUTOMATED COUNT: 14.4 % (ref 12.5–15.4)
ETEC ELTA+ESTB GENES STL QL NAA+PROBE: NORMAL
FERRITIN SERPL-MCNC: 800 NG/ML (ref 30–400)
FOLATE SERPL-MCNC: 13.7 NG/ML (ref 4.8–24.2)
GFR, ESTIMATED: >90 ML/MIN/1.73M2
GGT SERPL-CCNC: 80 U/L (ref 8–61)
GLUCOSE SERPL-MCNC: 102 MG/DL (ref 70–99)
HAV IGM SERPL QL IA: NONREACTIVE
HBV CORE IGM SERPL QL IA: NONREACTIVE
HBV SURFACE AG SERPL QL IA: NONREACTIVE
HCT VFR BLD AUTO: 41.6 % (ref 41–53)
HCV AB SERPL QL IA: NONREACTIVE
HGB BLD-MCNC: 14.3 G/DL (ref 13.5–17.5)
INR PPP: 1.1 (ref 0.9–1.2)
IRON SATN MFR SERPL: 18 % (ref 20–55)
IRON SERPL-MCNC: 30 UG/DL (ref 61–157)
LYMPHOCYTES NFR BLD: 0.8 K/UL (ref 1–4.8)
LYMPHOCYTES RELATIVE PERCENT: 6 % (ref 24–44)
MCH RBC QN AUTO: 29.6 PG (ref 26–34)
MCHC RBC AUTO-ENTMCNC: 34.3 G/DL (ref 31–37)
MCV RBC AUTO: 86.3 FL (ref 80–100)
MONOCYTES NFR BLD: 1.3 K/UL (ref 0.1–1.2)
MONOCYTES NFR BLD: 10 % (ref 2–11)
MRSA, DNA, NASAL: NEGATIVE
NEUTROPHILS NFR BLD: 82 % (ref 36–66)
NEUTS SEG NFR BLD: 10.6 K/UL (ref 1.8–7.7)
P SHIGELLOIDES DNA STL QL NAA+PROBE: NORMAL
PLATELET # BLD AUTO: 219 K/UL (ref 140–450)
PMV BLD AUTO: 8 FL (ref 6–12)
POTASSIUM SERPL-SCNC: 3.7 MMOL/L (ref 3.7–5.3)
PROT SERPL-MCNC: 6.9 G/DL (ref 6.4–8.3)
PROTHROMBIN TIME: 13.9 SEC (ref 11.8–14.6)
RBC # BLD AUTO: 4.82 M/UL (ref 4.5–5.9)
SALMONELLA DNA SPEC QL NAA+PROBE: NORMAL
SHIGA TOXIN STX GENE SPEC NAA+PROBE: NORMAL
SHIGELLA DNA SPEC QL NAA+PROBE: NORMAL
SODIUM SERPL-SCNC: 134 MMOL/L (ref 135–144)
SPECIMEN DESCRIPTION: NORMAL
SPECIMEN DESCRIPTION: NORMAL
TIBC SERPL-MCNC: 168 UG/DL (ref 250–450)
UNSATURATED IRON BINDING CAPACITY: 138 UG/DL (ref 112–347)
V CHOL+PARA RFBL+TRKH+TNAA STL QL NAA+PR: NORMAL
VIT B12 SERPL-MCNC: 450 PG/ML (ref 232–1245)
WBC OTHER # BLD: 12.9 K/UL (ref 3.5–11)
Y ENTERO RECN STL QL NAA+PROBE: NORMAL

## 2025-04-11 PROCEDURE — 82105 ALPHA-FETOPROTEIN SERUM: CPT

## 2025-04-11 PROCEDURE — 86645 CMV ANTIBODY IGM: CPT

## 2025-04-11 PROCEDURE — 82103 ALPHA-1-ANTITRYPSIN TOTAL: CPT

## 2025-04-11 PROCEDURE — 82390 ASSAY OF CERULOPLASMIN: CPT

## 2025-04-11 PROCEDURE — 6360000002 HC RX W HCPCS

## 2025-04-11 PROCEDURE — 86376 MICROSOMAL ANTIBODY EACH: CPT

## 2025-04-11 PROCEDURE — 86664 EPSTEIN-BARR NUCLEAR ANTIGEN: CPT

## 2025-04-11 PROCEDURE — G0378 HOSPITAL OBSERVATION PER HR: HCPCS

## 2025-04-11 PROCEDURE — 82746 ASSAY OF FOLIC ACID SERUM: CPT

## 2025-04-11 PROCEDURE — 83516 IMMUNOASSAY NONANTIBODY: CPT

## 2025-04-11 PROCEDURE — 85610 PROTHROMBIN TIME: CPT

## 2025-04-11 PROCEDURE — 86644 CMV ANTIBODY: CPT

## 2025-04-11 PROCEDURE — 85025 COMPLETE CBC W/AUTO DIFF WBC: CPT

## 2025-04-11 PROCEDURE — 83550 IRON BINDING TEST: CPT

## 2025-04-11 PROCEDURE — 80074 ACUTE HEPATITIS PANEL: CPT

## 2025-04-11 PROCEDURE — 36415 COLL VENOUS BLD VENIPUNCTURE: CPT

## 2025-04-11 PROCEDURE — 6370000000 HC RX 637 (ALT 250 FOR IP)

## 2025-04-11 PROCEDURE — 86225 DNA ANTIBODY NATIVE: CPT

## 2025-04-11 PROCEDURE — 86665 EPSTEIN-BARR CAPSID VCA: CPT

## 2025-04-11 PROCEDURE — 86663 EPSTEIN-BARR ANTIBODY: CPT

## 2025-04-11 PROCEDURE — 80053 COMPREHEN METABOLIC PANEL: CPT

## 2025-04-11 PROCEDURE — 99239 HOSP IP/OBS DSCHRG MGMT >30: CPT | Performed by: FAMILY MEDICINE

## 2025-04-11 PROCEDURE — 96366 THER/PROPH/DIAG IV INF ADDON: CPT

## 2025-04-11 PROCEDURE — 86038 ANTINUCLEAR ANTIBODIES: CPT

## 2025-04-11 PROCEDURE — 82977 ASSAY OF GGT: CPT

## 2025-04-11 PROCEDURE — 82728 ASSAY OF FERRITIN: CPT

## 2025-04-11 PROCEDURE — 83540 ASSAY OF IRON: CPT

## 2025-04-11 PROCEDURE — 82607 VITAMIN B-12: CPT

## 2025-04-11 RX ORDER — METRONIDAZOLE 500 MG/1
500 TABLET ORAL 2 TIMES DAILY
Qty: 10 TABLET | Refills: 0 | Status: SHIPPED | OUTPATIENT
Start: 2025-04-11 | End: 2025-04-16

## 2025-04-11 RX ORDER — LEVOFLOXACIN 750 MG/1
750 TABLET, FILM COATED ORAL DAILY
Qty: 5 TABLET | Refills: 0 | Status: SHIPPED | OUTPATIENT
Start: 2025-04-11 | End: 2025-04-16

## 2025-04-11 RX ORDER — LIDOCAINE HYDROCHLORIDE 10 MG/ML
1 INJECTION, SOLUTION EPIDURAL; INFILTRATION; INTRACAUDAL; PERINEURAL
Status: CANCELLED | OUTPATIENT
Start: 2025-04-11

## 2025-04-11 RX ORDER — SODIUM CHLORIDE 9 MG/ML
INJECTION, SOLUTION INTRAVENOUS PRN
Status: CANCELLED | OUTPATIENT
Start: 2025-04-11

## 2025-04-11 RX ORDER — PANTOPRAZOLE SODIUM 40 MG/1
40 TABLET, DELAYED RELEASE ORAL 2 TIMES DAILY
Qty: 180 TABLET | Refills: 1 | Status: SHIPPED | OUTPATIENT
Start: 2025-04-11

## 2025-04-11 RX ORDER — SODIUM CHLORIDE 0.9 % (FLUSH) 0.9 %
5-40 SYRINGE (ML) INJECTION PRN
Status: CANCELLED | OUTPATIENT
Start: 2025-04-11

## 2025-04-11 RX ORDER — FUROSEMIDE 40 MG/1
40 TABLET ORAL DAILY
Qty: 60 TABLET | Refills: 3 | Status: SHIPPED | OUTPATIENT
Start: 2025-04-12

## 2025-04-11 RX ORDER — SODIUM CHLORIDE, SODIUM LACTATE, POTASSIUM CHLORIDE, CALCIUM CHLORIDE 600; 310; 30; 20 MG/100ML; MG/100ML; MG/100ML; MG/100ML
INJECTION, SOLUTION INTRAVENOUS CONTINUOUS
Status: CANCELLED | OUTPATIENT
Start: 2025-04-11

## 2025-04-11 RX ORDER — SODIUM CHLORIDE 0.9 % (FLUSH) 0.9 %
5-40 SYRINGE (ML) INJECTION EVERY 12 HOURS SCHEDULED
Status: CANCELLED | OUTPATIENT
Start: 2025-04-11

## 2025-04-11 RX ADMIN — LEVOFLOXACIN 750 MG: 5 INJECTION, SOLUTION INTRAVENOUS at 01:24

## 2025-04-11 RX ADMIN — METRONIDAZOLE 500 MG: 500 INJECTION, SOLUTION INTRAVENOUS at 00:07

## 2025-04-11 RX ADMIN — PANTOPRAZOLE SODIUM 40 MG: 40 TABLET, DELAYED RELEASE ORAL at 05:46

## 2025-04-11 NOTE — DISCHARGE SUMMARY
[facility]  Office: [phone]  Francis Wynne DO, Barry Devries DO, Wei Downing DO, Khris Scott DO, David Evans MD, Kenia Gillis MD, Cecil Hampton MD, Jessy Franco MD,  Leroy Hill MD, Florecita Moran MD, Dylan Yeager MD,  Rio Collazo DO, Taisha Andrew MD, Donn Parks MD, Paul Wynne DO, Olimpia Hernandez MD,  Zia Dowd DO, Any Miller MD, Sherly Angeles MD, Cl Alexander MD,  Jorge Luis Bahena MD, Robin Wilson MD, Bubba Candelario MD, Dahlia Stapleton MD, Elder Rios MD, Dinesh Ledezma MD, Ricardo Foley DO, Alla Patton MD, Edu Vazquez MD, Rio Schmitt MD, Mohsin Reza, MD, Johnna Perrin MD, Shirley Waterhouse, CNP,  Chelle Perdomo, CNP, Ricardo Webb, CNP,  Ellen aJcinto, DNP, Bess Tao, CNP, Saira Taveras, CNP, Francine June, CNP, Kati Toussaint, CNP, Sidra García, PA-C, Ronel Hudson, CNP, Prakash Gardiner, CNP,  Monica Dawson, CNP, Katiuska Scherer, CNP, Jeramie Kelsey, PA-C, Mayela Sweeney, CNP,  Shauna Gibbons, CNS, Darlyn Izaguirre, CNP, Jacquelin Bucio, CNP,   Brit Hope, CNP         [facility]   IN-PATIENT SERVICE   [facility]    Discharge Summary     Patient ID: Jesús Min  :  1975   MRN: [id number]     ACCOUNT:  [id number]   Patient's PCP: Opal Foley DO  Admit Date: 2025   Discharge Date: 2025     Length of Stay: 1  Code Status:  Full Code  Admitting Physician: Jessy Fracno MD  Discharge Physician: Jessy Franco MD     Active Discharge Diagnoses:     Hospital Problem Lists:  Principal Problem:    Sepsis (HCC)  Active Problems:    HFrEF (heart failure with reduced ejection fraction) (HCC)    Aspiration pneumonia (HCC)    Steatohepatitis    Gastritis and duodenitis    S/P CABG x 3    Dyslipidemia    Hyponatremia    Transaminitis    Angiolipoma  Resolved Problems:    * No resolved hospital problems. *      Admission Condition:  poor     Discharged Condition: fair    Hospital Stay:

## 2025-04-11 NOTE — PLAN OF CARE
Problem: Chronic Conditions and Co-morbidities  Goal: Patient's chronic conditions and co-morbidity symptoms are monitored and maintained or improved  4/11/2025 0900 by Mackenzie Haque RN  Outcome: Completed  Flowsheets (Taken 4/11/2025 0815)  Care Plan - Patient's Chronic Conditions and Co-Morbidity Symptoms are Monitored and Maintained or Improved: Monitor and assess patient's chronic conditions and comorbid symptoms for stability, deterioration, or improvement  4/11/2025 0454 by Mattie Pickering RN  Outcome: Progressing  Flowsheets (Taken 4/10/2025 1615 by Mackenzie Haque, RN)  Care Plan - Patient's Chronic Conditions and Co-Morbidity Symptoms are Monitored and Maintained or Improved: Monitor and assess patient's chronic conditions and comorbid symptoms for stability, deterioration, or improvement     Problem: Discharge Planning  Goal: Discharge to home or other facility with appropriate resources  4/11/2025 0900 by Mackenzie Haque RN  Outcome: Completed  Flowsheets (Taken 4/11/2025 0815)  Discharge to home or other facility with appropriate resources: Identify barriers to discharge with patient and caregiver  4/11/2025 0454 by Mattie Pickering, RN  Outcome: Progressing  Flowsheets (Taken 4/10/2025 1615 by Mackenzie Haque, RN)  Discharge to home or other facility with appropriate resources: Identify barriers to discharge with patient and caregiver

## 2025-04-11 NOTE — PLAN OF CARE
Problem: Chronic Conditions and Co-morbidities  Goal: Patient's chronic conditions and co-morbidity symptoms are monitored and maintained or improved  4/11/2025 0454 by Mattie Pickering RN  Outcome: Progressing  Flowsheets (Taken 4/10/2025 1615 by Mackenzie Haque, RN)  Care Plan - Patient's Chronic Conditions and Co-Morbidity Symptoms are Monitored and Maintained or Improved: Monitor and assess patient's chronic conditions and comorbid symptoms for stability, deterioration, or improvement  4/10/2025 1539 by Mackenzie Haque, RN  Outcome: Progressing     Problem: Discharge Planning  Goal: Discharge to home or other facility with appropriate resources  4/11/2025 0454 by Mattie Pickering, RN  Outcome: Progressing  Flowsheets (Taken 4/10/2025 1615 by Mackenzie Haque, RN)  Discharge to home or other facility with appropriate resources: Identify barriers to discharge with patient and caregiver  4/10/2025 1539 by Mackenzie Haque, RN  Outcome: Progressing

## 2025-04-11 NOTE — TELEPHONE ENCOUNTER
Care Transitions Initial Follow Up Call    Outreach made within 2 business days of discharge: Yes    Patient: Jesús Min Patient : 1975   MRN: [id number]  Reason for Admission: Sepsis  Discharge Date: 25       Spoke with: Patient    Discharge department/facility: [facility] Interactive Patient Contact:  Was patient able to fill all prescriptions: Yes  Was patient instructed to bring all medications to the follow-up visit: Yes  Is patient taking all medications as directed in the discharge summary? Yes  Does patient understand their discharge instructions: Yes  Does patient have questions or concerns that need addressed prior to 7-14 day follow up office visit: no    Additional needs identified to be addressed with provider  No needs identified             Scheduled appointment with PCP within 7-14 days    Follow Up  Future Appointments   Date Time Provider Department Center   2025 11:45 AM Opal Foley DO [facility] DEP       Annmarie Clifford MA

## 2025-04-13 LAB
MICROORGANISM SPEC CULT: NORMAL
MICROORGANISM SPEC CULT: NORMAL
SERVICE CMNT-IMP: NORMAL
SERVICE CMNT-IMP: NORMAL
SMOOTH MUSCLE ANTIBODY: 14 UNITS (ref 0–19)
SPECIMEN DESCRIPTION: NORMAL
SPECIMEN DESCRIPTION: NORMAL

## 2025-04-14 ENCOUNTER — CARE COORDINATION (OUTPATIENT)
Dept: CARE COORDINATION | Age: 50
End: 2025-04-14

## 2025-04-14 LAB
ANA SER QL IA: NEGATIVE
DSDNA IGG SER QL IA: 1.5 IU/ML
EBV EA-D IGG SER-ACNC: 28 U/ML
EBV INTERPRETATION: ABNORMAL
EBV NA IGG SER IA-ACNC: 209 U/ML
EBV VCA IGG SER-ACNC: 1003 U/ML
EBV VCA IGM SER-ACNC: 10 U/ML
LKM AB TITR SER IF: NORMAL {TITER}
MITOCHONDRIA M2 IGG SER-ACNC: 1.2 U/ML (ref 0–4)
NUCLEAR IGG SER IA-RTO: 0.3 U/ML

## 2025-04-14 NOTE — CARE COORDINATION
Care Transitions Note    Initial Call - Call within 2 business days of discharge: Yes    Attempted to reach patient for transitions of care follow up. Unable to reach patient.    Outreach Attempts:   Patient's daughter answered the phone-LM with her and advised I would attempt to call again tomorrow.     Patient: Jesús Min    Patient : 1975   MRN: [id number]    Reason for Admission: Sepsis,pneumonia  Discharge Date: 25  RURS: Readmission Risk Score: 13.2    Last Discharge Facility       Date Complaint Diagnosis Description Type Department Provider    25 Vomiting; Nausea SIRS (systemic inflammatory response syndrome) (HCC) ... ED to Hosp-Admission (Discharged) (ADMITTED) [facility] Jessy Franco MD; Kiet Cameron...            Was this an external facility discharge? No    Follow Up Appointment:   Patient has hospital follow up appointment scheduled within 7 days of discharge.    Future Appointments         Provider Specialty Dept Phone    2025 11:45 AM Opal Foley DO Primary Care [phone]            Plan for follow-up on next business day.      Robina Castro LPN

## 2025-04-15 ENCOUNTER — CARE COORDINATION (OUTPATIENT)
Dept: CARE COORDINATION | Age: 50
End: 2025-04-15

## 2025-04-15 LAB
MICROORGANISM SPEC CULT: NORMAL
MICROORGANISM SPEC CULT: NORMAL
SERVICE CMNT-IMP: NORMAL
SERVICE CMNT-IMP: NORMAL
SPECIMEN DESCRIPTION: NORMAL
SPECIMEN DESCRIPTION: NORMAL

## 2025-04-15 NOTE — CARE COORDINATION
Care Transitions Note    Initial Call - Call within 2 business days of discharge: Yes    Patient Current Location:  Home: [address]  [id number]  [facility] [id number]    LPN Care Coordinator contacted the patient by telephone to perform post hospital discharge assessment, verified name and  as identifiers.  Provided introduction to self, and explanation of the LPN Care Coordinator role.    Patient: Jesús Min    Patient : 1975   MRN: [id number]    Reason for Admission: Sepsis  Discharge Date: 25  RURS: Readmission Risk Score: 13.2      Last Discharge Facility       Date Complaint Diagnosis Description Type Department Provider    25 Vomiting; Nausea SIRS (systemic inflammatory response syndrome) (HCC) ... ED to Hosp-Admission (Discharged) (ADMITTED) [facility] 3 Jessy Parr MD; Kiet Cameron...            Was this an external facility discharge? No    Additional needs identified to be addressed with provider                 Method of communication with provider: none.    Patients top risk factors for readmission: medical condition-     Interventions to address risk factors:   Review of patient management of conditions/medications:      Care Summary Note: Writer spoke with Jesús for a follow up care transitions call. He reports he is feeling almost back to his baseline. He states he returned to work last night and tolerated it well. He denies having a fever or chills and is not having any SOB,coughing,chest pain,palpitations and also denies having any abdominal pain,n/v/d. Medications reviewed.  He will complete his antibiotics tomorrow and is seeing his PCP tomorrow for his hospital follow up. He thanked writer for calling and denied having any other needs or concerns at this time. He is agreeable to a follow up call next week.    N Care Coordinator reviewed discharge instructions with patient. The patient was given an opportunity to ask questions; all questions answered at this time.. The

## 2025-04-22 ENCOUNTER — CARE COORDINATION (OUTPATIENT)
Dept: CARE COORDINATION | Age: 50
End: 2025-04-22

## 2025-04-22 NOTE — CARE COORDINATION
Care Transitions Note    Follow Up Call     Attempted to reach patient for transitions of care follow up.  Unable to reach patient.      Outreach Attempts:   HIPAA compliant voicemail left for patient.     Care Summary Note: 1st attempt          Plan for follow-up call in 2-5 days based on severity of symptoms and risk factors. Plan for next call:  symptom management-any s/s of pneumonia,sepsis or gastritis?   follow-up appointment-review notes from PCP.       Robina Castro LPN

## 2025-04-24 ENCOUNTER — CARE COORDINATION (OUTPATIENT)
Dept: CARE COORDINATION | Age: 50
End: 2025-04-24

## 2025-04-24 NOTE — CARE COORDINATION
Care Transitions Note    Follow Up Call     Attempted to reach patient for transitions of care follow up.  Unable to reach patient.      Outreach Attempts:   Multiple attempts to contact patient at phone numbers on file.   HIPAA compliant voicemail left for patient.     Care Summary Note: 2nd attempt-will end care transitions if no return call received.       No further follow-up call indicated based on severity of symptoms and risk factors.     Robina Castro LPN

## 2025-04-29 RX ORDER — METOPROLOL TARTRATE 25 MG/1
12.5 TABLET, FILM COATED ORAL 2 TIMES DAILY
Qty: 30 TABLET | Refills: 3 | Status: SHIPPED | OUTPATIENT
Start: 2025-04-29

## 2025-05-18 ENCOUNTER — HOSPITAL ENCOUNTER (EMERGENCY)
Age: 50
Discharge: HOME | End: 2025-05-18
Attending: EMERGENCY MEDICINE
Payer: COMMERCIAL

## 2025-05-18 VITALS
TEMPERATURE: 98.6 F | SYSTOLIC BLOOD PRESSURE: 134 MMHG | BODY MASS INDEX: 39.37 KG/M2 | DIASTOLIC BLOOD PRESSURE: 76 MMHG | OXYGEN SATURATION: 98 % | WEIGHT: 245 LBS | RESPIRATION RATE: 18 BRPM | HEIGHT: 66 IN | HEART RATE: 60 BPM

## 2025-05-18 DIAGNOSIS — T14.8XXA SKIN AVULSION: Primary | ICD-10-CM

## 2025-05-18 PROCEDURE — 99283 EMERGENCY DEPT VISIT LOW MDM: CPT

## 2025-05-18 PROCEDURE — 6370000000 HC RX 637 (ALT 250 FOR IP): Performed by: NURSE PRACTITIONER

## 2025-05-18 RX ORDER — IBUPROFEN 600 MG/1
600 TABLET, FILM COATED ORAL 3 TIMES DAILY PRN
Qty: 30 TABLET | Refills: 0 | Status: SHIPPED | OUTPATIENT
Start: 2025-05-18

## 2025-05-18 RX ADMIN — GELATIN ABSORBABLE SPONGE SIZE 100 1 EACH: MISC at 17:39

## 2025-05-18 ASSESSMENT — PAIN DESCRIPTION - ORIENTATION: ORIENTATION: RIGHT

## 2025-05-18 ASSESSMENT — PAIN DESCRIPTION - DESCRIPTORS: DESCRIPTORS: POUNDING

## 2025-05-18 ASSESSMENT — PAIN SCALES - GENERAL: PAINLEVEL_OUTOF10: 4

## 2025-05-18 ASSESSMENT — PAIN DESCRIPTION - LOCATION: LOCATION: FINGER (COMMENT WHICH ONE)

## 2025-05-18 ASSESSMENT — PAIN - FUNCTIONAL ASSESSMENT: PAIN_FUNCTIONAL_ASSESSMENT: 0-10

## 2025-05-18 NOTE — DISCHARGE INSTRUCTIONS
Can change the outer dressing as needed, leave the sponge foam over the missing skin, will slowly dissolve and come off.    Keep finger covered while at work, no soaking in dirty standing water like dishwater, hot tubs, swimming pools, lakes rivers or ponds.    Return to the ER: Fevers, redness warmth swelling to the finger, streaks of red going up the finger or hand, weakness, vomiting; or any other concerning symptoms

## 2025-05-18 NOTE — ED PROVIDER NOTES
[facility] EMERGENCY DEPARTMENT  eMERGENCY dEPARTMENT eNCOUnter   Independent Attestation     Pt Name: Jesús Min  MRN: [id number]  Birthdate 1975  Date of evaluation: 5/18/25       Jesús Min is a 50 y.o. male who presents with Hand Injury (Pt cut right ring finger with a knife while at work. Pt is on blood thinners and bleeding will not stop. )        Based on the medical record, the care appears appropriate. I was personally available for consultation in the Emergency Department.    Gómez Cruz MD  Attending Emergency  Physician               Gómez Cruz MD  05/18/25 0717

## 2025-05-18 NOTE — ED PROVIDER NOTES
[facility] EMERGENCY DEPARTMENT  EMERGENCY DEPARTMENT ENCOUNTER      Pt Name: Jesús Min  MRN: [id number]  Birthdate 1975  Date of evaluation: 5/18/2025  Provider: JOSE Bautista CNP  6:11 PM    CHIEF COMPLAINT       Chief Complaint   Patient presents with    Hand Injury     Pt cut right ring finger with a knife while at work. Pt is on blood thinners and bleeding will not stop.          HISTORY OF PRESENT ILLNESS    Jesús Min is a 50 y.o. male who presents to the emergency department      This is a nontoxic-appearing 50-year-old male presenting to the emergency department via private auto with his family members, just prior to arrival, he was at work cutting tomatoes, the knife jammed into the tip of his right hand fourth distal digit palm side causing wound/laceration injury, patient is on Plavix reports that he was unable to get the bleeding to stop prompting the emergency room visit.  Patient reports his last tetanus immunization was in the last 2 to 3 years.  The patient has tried holding pressure to the wound, no other alleviating measures have been attempted prior to arrival.    The history is provided by the patient and medical records.       Nursing Notes were reviewed.    REVIEW OF SYSTEMS       Review of Systems   Constitutional:  Negative for chills and fever.   HENT:  Negative for congestion, ear pain and sneezing.    Eyes:  Negative for discharge and visual disturbance.   Respiratory:  Negative for cough and shortness of breath.    Cardiovascular:  Negative for chest pain and palpitations.   Gastrointestinal:  Negative for abdominal pain, constipation, diarrhea, nausea and vomiting.   Genitourinary:  Negative for dysuria and frequency.   Musculoskeletal:  Negative for back pain and neck pain.   Skin:  Positive for wound. Negative for rash.   Neurological:  Negative for weakness, numbness and headaches.   Psychiatric/Behavioral:  Negative for confusion and suicidal ideas.

## 2025-05-21 ENCOUNTER — TELEPHONE (OUTPATIENT)
Age: 50
End: 2025-05-21

## 2025-05-21 NOTE — TELEPHONE ENCOUNTER
Margy Maldonado, APRN - CNP,      A heart failure care gap has been identified for this patient:   Jesús Min is a 50 y.o. male with HFrEF but is not currently prescribed one of the three evidence-based beta-blockers (metoprolol succinate ER, carvedilol, bisoprolol), which have demonstrated reduced mortality and hospitalizations.   Patient is currently taking metoprolol tartrate 12.5 mg twice daily  Please consider transitioning patient to metoprolol succinate 25 mg daily. If you agree, please send new prescription to patient's pharmacy.     In the future, the patient may benefit from the addition of spironolactone 25 mg daily to maximize the 4 pillars of GDMT.    If the patient is unable to take any of these agents, please respond back to me so the contraindication/intolerance can be documented in the medical chart.      Last visit: 10/11/2024  Next visit: needs appointment scheduled    See encounter note(s) below for complete details. Please let me know if you have any questions.      Thank you,  Steff Franks, PharmD  Population Health Pharmacist  [facility] Clinical Pharmacy  Department, toll free: [phone], option 1     =======================================================    [facility] CLINICAL PHARMACY: HEART FAILURE TREATMENT REVIEW  As part of [facility]'s efforts to reduce heart failure (HF) treatment gaps across the healthcare continuum, the organization is focused on improving alignment with guideline-directed medical therapy (GDMT) for heart failure with reduced ejection fraction (HFrEF).    BETA-BLOCKER GAP IDENTIFIED  Jesús Min is an adult with a diagnosis of HF with a current or previous left ventricular ejection fraction (LVEF) of <=40% who is NOT prescribed an evidence-based medicine beta-blocker for HF as reflected in the Epic ambulatory medication list.     Allergies   Allergen Reactions    Codeine Nausea Only    Pcn [Penicillins]       has a past medical history of CHF

## 2025-05-27 NOTE — TELEPHONE ENCOUNTER
[facility] CLINICAL PHARMACY: HEART FAILURE TREATMENT REVIEW     Provider opened encounter with no changes to medications. Closing encounter at this time.    Milagros BaileyD  [facility] Pharmacist  [facility] Clinical Pharmacy  Department, toll free: [phone], option 1

## 2025-07-01 ENCOUNTER — TELEPHONE (OUTPATIENT)
Dept: PRIMARY CARE CLINIC | Age: 50
End: 2025-07-01

## 2025-07-01 NOTE — TELEPHONE ENCOUNTER
Our records indicate that Jesús Min may benefit from medication optimization to meet the three pillars of CHF care (ACE/ARB/ARNI, Beta Blocker, and MRA therapy). The ask is to change the patient's medications below, as appropriate. If the patient is being followed by a cardiologist, please ensure they have a follow up appointment with that provider to discuss further.     The measure definition, denominator, inclusions, and exclusions are below:

## (undated) DEVICE — Device

## (undated) DEVICE — SS SUTURE, 3 PER SLEEVE: Brand: MYO/WIRE II

## (undated) DEVICE — APPLICATOR MEDICATED 26 CC SOLUTION HI LT ORNG CHLORAPREP

## (undated) DEVICE — DRAPE SLUSH DISC W44XL66IN ST FOR RND BSIN HUSH SLUSH SYS

## (undated) DEVICE — SUTURE ABSORBABLE L 18 IN SZ 4-0 NDL L 19 MM POLYGLACTIN 910 36/BX

## (undated) DEVICE — SUTURE VICRYL + SZ 4-0 L27IN ABSRB UD L26MM SH 1/2 CIR VCP415H

## (undated) DEVICE — PREMIUM DRY TRAY LF: Brand: MEDLINE INDUSTRIES, INC.

## (undated) DEVICE — KIT APPL 11:1 PROC W/ FIBRIJET MED CUP APPL TIP TY

## (undated) DEVICE — APPLICATOR MEDICATED 10.5 CC SOLUTION HI LT ORNG CHLORAPREP

## (undated) DEVICE — SUTURE PROL SZ 7-0 L24IN NONABSORBABLE BLU L8MM BV175-6 3/8 8735H

## (undated) DEVICE — AGENT HEMSTAT W2XL4IN OXIDIZED REGENERATED CELOS ABSRB SFT

## (undated) DEVICE — SUTURE PERMAHAND SZ 4-0 L18IN NONABSORBABLE BLK SILK BRAID A183H

## (undated) DEVICE — 1LYRTR 16FR10ML100%SILTMPS SNP: Brand: MEDLINE INDUSTRIES, INC.

## (undated) DEVICE — Device: Brand: VIRTUOSAPH PLUS WITH RADIAL INDICATION

## (undated) DEVICE — COVER,LIGHT HANDLE,FLX,2/PK: Brand: MEDLINE INDUSTRIES, INC.

## (undated) DEVICE — DRAIN,WOUND,ROUND,24FR,5/16",FULL-FLUTED: Brand: MEDLINE

## (undated) DEVICE — SUTURE PDS II SZ 0 L27IN ABSRB VLT L36MM CT-1 1/2 CIR Z340H

## (undated) DEVICE — BAND COMPR L24CM REG CLR PLAS HEMSTAT EXT HK AND LOOP RETEN

## (undated) DEVICE — CANNULA PERF 18FR L12IN CONN SITE 3/8IN ART VENT DIL TIP

## (undated) DEVICE — GOWN,SIRUS,NONRNF,SETINSLV,XL,20/CS: Brand: MEDLINE

## (undated) DEVICE — MPS® DELIVERY SET W/ARREST AGENT AND ADDITIVE CASSETTES, HEAT EXCHANGER & 10 FT. DELIVERY TUBING: Brand: MPS

## (undated) DEVICE — BLADE,CARBON-STEEL,15,STRL,DISPOSABLE: Brand: MEDLINE

## (undated) DEVICE — SURGICAL PROCEDURE TRAY CRD CATH SVMMC

## (undated) DEVICE — COVER,MAYO STAND,STERILE: Brand: MEDLINE

## (undated) DEVICE — WAX BNE BULK NAT STRL LTX

## (undated) DEVICE — GOWN,SIRUS,NONRNF,SETINSLV,2XL,18/CS: Brand: MEDLINE

## (undated) DEVICE — SENSOR KIT AD INVOS 7100

## (undated) DEVICE — GLOVE SURG SZ 7.5 L11.73IN FNGR THK9.8MIL STRW LTX POLYMER

## (undated) DEVICE — LIQUIBAND RAPID ADHESIVE 36/CS 0.8ML: Brand: MEDLINE

## (undated) DEVICE — SUTURE ETHIBOND EXCL BR GRN TAPR PT 2-0 30 X563H X563H

## (undated) DEVICE — TIP APPL GEL PLT ENDO 5MMX32CM

## (undated) DEVICE — STERNUM BLADE, OFFSET (32.0 X 0.8 X 6.3MM)

## (undated) DEVICE — SUTURE VICRYL + SZ 2-0 L27IN ABSRB CLR CT-1 1/2 CIR TAPERCUT VCP259H

## (undated) DEVICE — GLIDESHEATH SLENDER STAINLESS STEEL KIT: Brand: GLIDESHEATH SLENDER

## (undated) DEVICE — PLEDGET SURG W3.5XL7MM THK1.5MM WHT PTFE RECT FIRM TFE

## (undated) DEVICE — 3M™ IOBAN™ 2 ANTIMICROBIAL INCISE DRAPE 6651EZ: Brand: IOBAN™ 2

## (undated) DEVICE — INSUFFLATION TUBING SET WITH FILTER, FUNNEL CONNECTOR AND LUER LOCK: Brand: JOSNOE MEDICAL INC

## (undated) DEVICE — PACK PROCEDURE SURG OPN HRT ADD ON

## (undated) DEVICE — CONNECTOR PERF 1/4X1/4 STR

## (undated) DEVICE — SUTURE PROL SZ 6-0 L18IN NONABSORBABLE BLU RB-2 L13MM 1/2 8714H

## (undated) DEVICE — ANGIOGRAPHIC CATHETER: Brand: EXPO™

## (undated) DEVICE — BLOOD TRANSFER PACK 600 CC W/ CPLR

## (undated) DEVICE — RETRACTOR SURG INSRT SUT HLD OCTOBASE

## (undated) DEVICE — CANNULA TIP SPRY MAGELLAN

## (undated) DEVICE — GLOVE SURG SZ 8 L11.77IN FNGR THK9.8MIL STRW LTX POLYMER

## (undated) DEVICE — CONNECTOR TBNG Y 6IN 1 PLAS LTWT

## (undated) DEVICE — 3M™ IOBAN™ 2 ANTIMICROBIAL INCISE DRAPE 6650EZ: Brand: IOBAN™ 2

## (undated) DEVICE — TOWEL,OR,DSP,ST,NATURAL,DLX,4/PK,20PK/CS: Brand: MEDLINE

## (undated) DEVICE — Z DISCONTINUED USE 2220306 SUTURE VICRYL + SZ 3-0 L27IN ABSRB WHT CT-1 1/2 CIR VCP258H

## (undated) DEVICE — .: Brand: PERFECTCUT AORTOTOMY SYSTEM

## (undated) DEVICE — STRAP ARMBRD W1.5XL32IN FOAM STR YET SFT W/ HK AND LOOP

## (undated) DEVICE — GOWN,AURORA,NONREINFORCED,LARGE: Brand: MEDLINE

## (undated) DEVICE — SUTURE PROL SZ 4-0 L36IN NONABSORBABLE BLU L26MM SH 1/2 CIR 8521H

## (undated) DEVICE — HEMOCONCENTERATOR PERF W  TBNG AND ADPT SET 400 MEDIVATORS

## (undated) DEVICE — AVID DUAL STAGE VENOUS DRAINAGE CANNULA: Brand: AVID DUAL STAGE VENOUS DRAINAGE CANNULA

## (undated) DEVICE — SOLUTION IV 500ML 0.9% SOD CHL PH 5 INJ USP VIAFLX PLAS

## (undated) DEVICE — SUTURE PERMA-HAND SZ 0 L18IN NONABSORBABLE BLK CT-2 L26MM C027D

## (undated) DEVICE — TUBE CARDIAC SUCTION 6FR SOFT TIP 10FR

## (undated) DEVICE — PROTECTOR ULN NRV PUR FOAM HK LOOP STRP ANATOMICALLY

## (undated) DEVICE — PACK PROCEDURE SURG OPN HRT

## (undated) DEVICE — POSITIONER,HEAD,MULTIRING,36CS: Brand: MEDLINE

## (undated) DEVICE — ADAPTER PERF L7.5IN INLET LEG 3IN Y TYP VENT CLR CODE CLMP

## (undated) DEVICE — BLADE ES L6IN ELASTOMERIC COAT EXT DURABLE BEND UPTO 90DEG

## (undated) DEVICE — TOWEL,OR,DSP,ST,BLUE,DLX,XR,4/PK,20PK/CS: Brand: MEDLINE

## (undated) DEVICE — SUTURE MONOCRYL SZ 4-0 L18IN ABSRB UD L19MM PS-2 3/8 CIR PRIM Y496G

## (undated) DEVICE — KIT ANGEL PRP